# Patient Record
Sex: MALE | Race: WHITE | NOT HISPANIC OR LATINO | Employment: FULL TIME | URBAN - METROPOLITAN AREA
[De-identification: names, ages, dates, MRNs, and addresses within clinical notes are randomized per-mention and may not be internally consistent; named-entity substitution may affect disease eponyms.]

---

## 2023-03-11 LAB
FLU A RESULT: NOT DETECTED
FLU B RESULT: NOT DETECTED
SARS-COV-2 RESULT: NOT DETECTED

## 2023-03-30 PROBLEM — R07.89 CHEST HEAVINESS: Status: ACTIVE | Noted: 2023-03-30

## 2023-03-30 PROBLEM — F32.A ANXIETY AND DEPRESSION: Status: ACTIVE | Noted: 2023-03-30

## 2023-03-30 PROBLEM — E80.4 GILBERT'S SYNDROME: Status: ACTIVE | Noted: 2023-03-30

## 2023-03-30 PROBLEM — E87.6 HYPOKALEMIA: Status: ACTIVE | Noted: 2023-03-30

## 2023-03-30 PROBLEM — E66.9 CLASS 1 OBESITY WITH BODY MASS INDEX (BMI) OF 31.0 TO 31.9 IN ADULT: Status: ACTIVE | Noted: 2023-03-30

## 2023-03-30 PROBLEM — E78.5 HLD (HYPERLIPIDEMIA): Status: ACTIVE | Noted: 2023-03-30

## 2023-03-30 PROBLEM — R01.1 HEART MURMUR, SYSTOLIC: Status: ACTIVE | Noted: 2023-03-30

## 2023-03-30 PROBLEM — G47.00 INSOMNIA: Status: ACTIVE | Noted: 2023-03-30

## 2023-03-30 PROBLEM — R94.31 ABNORMAL EKG: Status: ACTIVE | Noted: 2023-03-30

## 2023-03-30 PROBLEM — H91.93 HEARING LOSS OF BOTH EARS: Status: ACTIVE | Noted: 2023-03-30

## 2023-03-30 PROBLEM — K20.0 EOSINOPHILIC ESOPHAGITIS: Status: ACTIVE | Noted: 2023-03-30

## 2023-03-30 PROBLEM — K44.9 HIATAL HERNIA: Status: ACTIVE | Noted: 2023-03-30

## 2023-03-30 PROBLEM — F41.9 ANXIETY AND DEPRESSION: Status: ACTIVE | Noted: 2023-03-30

## 2023-03-30 PROBLEM — E66.811 CLASS 1 OBESITY WITH BODY MASS INDEX (BMI) OF 31.0 TO 31.9 IN ADULT: Status: ACTIVE | Noted: 2023-03-30

## 2023-03-30 PROBLEM — T18.128A FOOD IMPACTION OF ESOPHAGUS: Status: ACTIVE | Noted: 2023-03-30

## 2023-03-30 PROBLEM — R13.14 DYSPHAGIA, PHARYNGOESOPHAGEAL PHASE: Status: ACTIVE | Noted: 2023-03-30
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
## 2023-03-30 PROBLEM — I10 HYPERTENSION: Status: ACTIVE | Noted: 2023-03-30

## 2023-03-30 PROBLEM — R01.1 HEART MURMUR ON PHYSICAL EXAMINATION: Status: ACTIVE | Noted: 2023-03-30

## 2023-03-30 RX ORDER — SERTRALINE HYDROCHLORIDE 100 MG/1
100 TABLET, FILM COATED ORAL DAILY
COMMUNITY
End: 2023-07-25

## 2023-03-30 RX ORDER — TADALAFIL 10 MG/1
TABLET ORAL
COMMUNITY

## 2023-03-30 RX ORDER — ASCORBIC ACID 500 MG
TABLET,CHEWABLE ORAL
COMMUNITY
Start: 2020-11-02

## 2023-03-30 RX ORDER — CANDESARTAN 16 MG/1
1 TABLET ORAL DAILY
COMMUNITY
Start: 2021-11-02 | End: 2023-09-05

## 2023-03-30 RX ORDER — ACETAMINOPHEN 500 MG
TABLET ORAL
COMMUNITY
Start: 2020-11-02 | End: 2024-03-22 | Stop reason: ALTCHOICE

## 2023-03-30 RX ORDER — AMLODIPINE BESYLATE 10 MG/1
1 TABLET ORAL DAILY
COMMUNITY
Start: 2016-02-10 | End: 2023-05-22

## 2023-03-30 RX ORDER — CHLORTHALIDONE 25 MG/1
1 TABLET ORAL DAILY
COMMUNITY
Start: 2022-02-14 | End: 2023-09-05

## 2023-03-30 RX ORDER — EMTRICITABINE AND TENOFOVIR DISOPROXIL FUMARATE 200; 300 MG/1; MG/1
1 TABLET, FILM COATED ORAL DAILY
COMMUNITY
Start: 2021-11-01 | End: 2023-10-26 | Stop reason: SDUPTHER

## 2023-03-30 RX ORDER — ATORVASTATIN CALCIUM 20 MG/1
1 TABLET, FILM COATED ORAL DAILY
COMMUNITY
Start: 2020-08-05 | End: 2023-10-23

## 2023-03-30 RX ORDER — OMEPRAZOLE 40 MG/1
1 CAPSULE, DELAYED RELEASE ORAL
COMMUNITY
Start: 2021-02-12 | End: 2024-04-30 | Stop reason: ALTCHOICE

## 2023-03-31 ENCOUNTER — OFFICE VISIT (OUTPATIENT)
Dept: PRIMARY CARE | Facility: CLINIC | Age: 53
End: 2023-03-31
Payer: COMMERCIAL

## 2023-03-31 VITALS
OXYGEN SATURATION: 98 % | TEMPERATURE: 98.3 F | BODY MASS INDEX: 30.09 KG/M2 | RESPIRATION RATE: 18 BRPM | HEART RATE: 87 BPM | HEIGHT: 75 IN | WEIGHT: 242 LBS | SYSTOLIC BLOOD PRESSURE: 122 MMHG | DIASTOLIC BLOOD PRESSURE: 86 MMHG

## 2023-03-31 DIAGNOSIS — J01.90 ACUTE SINUSITIS, RECURRENCE NOT SPECIFIED, UNSPECIFIED LOCATION: Primary | ICD-10-CM

## 2023-03-31 PROCEDURE — 3074F SYST BP LT 130 MM HG: CPT | Performed by: FAMILY MEDICINE

## 2023-03-31 PROCEDURE — 3079F DIAST BP 80-89 MM HG: CPT | Performed by: FAMILY MEDICINE

## 2023-03-31 PROCEDURE — 99213 OFFICE O/P EST LOW 20 MIN: CPT | Performed by: FAMILY MEDICINE

## 2023-03-31 PROCEDURE — 3008F BODY MASS INDEX DOCD: CPT | Performed by: FAMILY MEDICINE

## 2023-03-31 PROCEDURE — 1036F TOBACCO NON-USER: CPT | Performed by: FAMILY MEDICINE

## 2023-03-31 RX ORDER — AMOXICILLIN AND CLAVULANATE POTASSIUM 875; 125 MG/1; MG/1
1 TABLET, FILM COATED ORAL 2 TIMES DAILY
Qty: 20 TABLET | Refills: 0 | Status: SHIPPED | OUTPATIENT
Start: 2023-03-31 | End: 2023-04-10

## 2023-03-31 ASSESSMENT — ENCOUNTER SYMPTOMS
SHORTNESS OF BREATH: 0
SINUS COMPLAINT: 1
HEADACHES: 0

## 2023-03-31 NOTE — PROGRESS NOTES
"Subjective     Blake Dominique is a 53 y.o. male who presents for Sinus Problem (Pt. In for sinus problem.).    Sinus Problem  Pertinent negatives include no headaches or shortness of breath.        Review of Systems   Respiratory:  Negative for shortness of breath.    Cardiovascular:  Negative for chest pain.   Neurological:  Negative for headaches.     Pt reports right sided sinus pressure which three weeks ago. He also has nasal drainage, nasal congestion.  Intially had a fever about three weeks ago. He had negative covid and flu swabs at that time. No cough.  He is using nasal steroid as needed and taking sudafed.       Objective     Vitals:    03/31/23 1450   BP: 122/86   BP Location: Right arm   Patient Position: Sitting   Pulse: 87   Resp: 18   Temp: 36.8 °C (98.3 °F)   TempSrc: Temporal   SpO2: 98%   Weight: 110 kg (242 lb)   Height: 1.905 m (6' 3\")        Current Outpatient Medications   Medication Instructions    amLODIPine (Norvasc) 10 mg tablet 1 tablet, oral, Daily    amoxicillin-pot clavulanate (Augmentin) 875-125 mg tablet 875 mg, oral, 2 times daily    ascorbic acid (Vitamin C) 500 mg chewable tablet GNP Vitamin C 500 MG Oral Tablet   Refills: 0        Start : 2-Nov-2020   Active    atorvastatin (Lipitor) 20 mg tablet 1 tablet, oral, Daily    candesartan (Atacand) 16 mg tablet 1 tablet, oral, Daily    chlorthalidone (Hygroton) 25 mg tablet 1 tablet, oral, Daily    cholecalciferol (Vitamin D-3) 50 mcg (2,000 unit) capsule Vitamin D3 50 MCG (2000 UT) Oral Capsule   Refills: 0        Start : 2-Nov-2020   Active    emtricitabine-tenofovir, TDF, (Truvada) 200-300 mg tablet 1 tablet, oral, Daily    fish oil concentrate (Omega-3) 120-180 mg capsule 1 capsule, oral, Daily    omeprazole (PriLOSEC) 40 mg DR capsule 1 capsule, oral, Daily before breakfast    sertraline (ZOLOFT) 100 mg, oral, Daily, As directed    tadalafil (Cialis) 10 mg tablet Tadalafil 10 MG Oral Tablet   Refills: 0       Active        Past " Surgical History:   Procedure Laterality Date    OTHER SURGICAL HISTORY  08/27/2020    Hernia repair    OTHER SURGICAL HISTORY  08/27/2020    Tonsillectomy        Social History     Tobacco Use    Smoking status: Never    Smokeless tobacco: Never        Family History   Problem Relation Name Age of Onset    Dementia Mother      Other (Cardiac disorder) Father      Stroke Father      Colon cancer Other cousin         Immunization History   Administered Date(s) Administered    Hep A, Adult 10/01/2019    Influenza, Unspecified 09/11/2020    Moderna SARS-CoV-2 Vaccination 01/04/2021, 02/01/2021, 11/03/2021    Moderna Sars-cov-2 Bivalent Booster 01/27/2023    Smallpox Monkeypox, Live Attenuated, Preservative Free 08/21/2022, 10/04/2022    Tdap 01/15/2015    Zoster, Recombinant 03/22/2022, 08/30/2022        Physical Exam  Vitals reviewed.   Constitutional:       General: He is not in acute distress.     Appearance: Normal appearance. He is well-developed.   HENT:      Head: Normocephalic and atraumatic.      Comments: Right maxillary  sinus ttp.     Nasal turbinate swelling bilaterally     Right Ear: Tympanic membrane, ear canal and external ear normal.      Left Ear: Tympanic membrane, ear canal and external ear normal.      Nose: Nose normal.      Mouth/Throat:      Mouth: Mucous membranes are moist.      Pharynx: Oropharynx is clear. No oropharyngeal exudate or posterior oropharyngeal erythema.   Eyes:      General: Lids are normal.      Extraocular Movements: Extraocular movements intact.      Conjunctiva/sclera: Conjunctivae normal.      Right eye: Right conjunctiva is not injected.      Left eye: Left conjunctiva is not injected.   Cardiovascular:      Rate and Rhythm: Normal rate and regular rhythm.      Heart sounds: No murmur heard.  Pulmonary:      Effort: Pulmonary effort is normal. No respiratory distress.      Breath sounds: Normal breath sounds. No wheezing, rhonchi or rales.   Lymphadenopathy:       Cervical: No cervical adenopathy.   Skin:     General: Skin is warm and dry.      Findings: No rash.   Neurological:      Mental Status: He is alert and oriented to person, place, and time. Mental status is at baseline.      Gait: Gait normal.   Psychiatric:         Mood and Affect: Mood normal.         Behavior: Behavior normal.         Problem List Items Addressed This Visit    None  Visit Diagnoses       Acute sinusitis, recurrence not specified, unspecified location    -  Primary    Relevant Medications    amoxicillin-pot clavulanate (Augmentin) 875-125 mg tablet            Assessment/Plan      Acute sinusitis - augmentin prescribed, continue flonase, Follow up if no improvement or if symptoms worsen, go the the nearest Emergency Department if your condition worsens significantly or becomes life-threatening.

## 2023-04-28 ENCOUNTER — APPOINTMENT (OUTPATIENT)
Dept: LAB | Facility: LAB | Age: 53
End: 2023-04-28
Payer: COMMERCIAL

## 2023-04-28 LAB — SYPHILIS TOTAL AB: NONREACTIVE

## 2023-05-02 LAB
CHLAMYDIA TRACH., AMPLIFIED: NEGATIVE
N. GONORRHEA, AMPLIFIED: NEGATIVE

## 2023-05-21 DIAGNOSIS — I10 ESSENTIAL (PRIMARY) HYPERTENSION: ICD-10-CM

## 2023-05-22 RX ORDER — AMLODIPINE BESYLATE 10 MG/1
TABLET ORAL
Qty: 90 TABLET | Refills: 1 | Status: SHIPPED | OUTPATIENT
Start: 2023-05-22 | End: 2023-12-01

## 2023-07-22 DIAGNOSIS — F32.A DEPRESSION, UNSPECIFIED: ICD-10-CM

## 2023-07-22 DIAGNOSIS — F41.9 ANXIETY DISORDER, UNSPECIFIED: ICD-10-CM

## 2023-07-25 RX ORDER — SERTRALINE HYDROCHLORIDE 100 MG/1
100 TABLET, FILM COATED ORAL DAILY
Qty: 90 TABLET | Refills: 3 | Status: SHIPPED | OUTPATIENT
Start: 2023-07-25 | End: 2023-10-30 | Stop reason: ALTCHOICE

## 2023-07-29 ENCOUNTER — APPOINTMENT (OUTPATIENT)
Dept: LAB | Facility: LAB | Age: 53
End: 2023-07-29
Payer: COMMERCIAL

## 2023-07-29 LAB
ALBUMIN (G/DL) IN SER/PLAS: 4.7 G/DL (ref 3.4–5)
ANION GAP IN SER/PLAS: 11 MMOL/L (ref 10–20)
CALCIUM (MG/DL) IN SER/PLAS: 10 MG/DL (ref 8.6–10.6)
CARBON DIOXIDE, TOTAL (MMOL/L) IN SER/PLAS: 30 MMOL/L (ref 21–32)
CHLORIDE (MMOL/L) IN SER/PLAS: 100 MMOL/L (ref 98–107)
CREATININE (MG/DL) IN SER/PLAS: 0.95 MG/DL (ref 0.5–1.3)
GFR MALE: >90 ML/MIN/1.73M2
GLUCOSE (MG/DL) IN SER/PLAS: 97 MG/DL (ref 74–99)
HIV 1/ 2 AG/AB SCREEN: NONREACTIVE
PHOSPHATE (MG/DL) IN SER/PLAS: 3.4 MG/DL (ref 2.5–4.9)
POTASSIUM (MMOL/L) IN SER/PLAS: 3.6 MMOL/L (ref 3.5–5.3)
SODIUM (MMOL/L) IN SER/PLAS: 137 MMOL/L (ref 136–145)
SYPHILIS TOTAL AB: NONREACTIVE
UREA NITROGEN (MG/DL) IN SER/PLAS: 18 MG/DL (ref 6–23)

## 2023-07-30 LAB
CHLAMYDIA TRACH., AMPLIFIED: NEGATIVE
N. GONORRHEA, AMPLIFIED: NEGATIVE

## 2023-09-04 DIAGNOSIS — I10 ESSENTIAL (PRIMARY) HYPERTENSION: ICD-10-CM

## 2023-09-05 RX ORDER — CHLORTHALIDONE 25 MG/1
25 TABLET ORAL DAILY
Qty: 90 TABLET | Refills: 3 | Status: SHIPPED | OUTPATIENT
Start: 2023-09-05 | End: 2024-05-03 | Stop reason: ALTCHOICE

## 2023-09-05 RX ORDER — CANDESARTAN 16 MG/1
16 TABLET ORAL DAILY
Qty: 90 TABLET | Refills: 3 | Status: SHIPPED | OUTPATIENT
Start: 2023-09-05

## 2023-10-21 DIAGNOSIS — E78.5 HYPERLIPIDEMIA, UNSPECIFIED: ICD-10-CM

## 2023-10-23 RX ORDER — ATORVASTATIN CALCIUM 20 MG/1
20 TABLET, FILM COATED ORAL DAILY
Qty: 90 TABLET | Refills: 0 | Status: SHIPPED | OUTPATIENT
Start: 2023-10-23 | End: 2024-01-25

## 2023-10-30 ENCOUNTER — OFFICE VISIT (OUTPATIENT)
Dept: IMMUNOLOGY | Facility: CLINIC | Age: 53
End: 2023-10-30
Payer: COMMERCIAL

## 2023-10-30 VITALS
DIASTOLIC BLOOD PRESSURE: 87 MMHG | SYSTOLIC BLOOD PRESSURE: 122 MMHG | OXYGEN SATURATION: 97 % | TEMPERATURE: 97.6 F | RESPIRATION RATE: 18 BRPM | HEIGHT: 75 IN | BODY MASS INDEX: 31.76 KG/M2 | HEART RATE: 87 BPM | WEIGHT: 255.4 LBS

## 2023-10-30 DIAGNOSIS — Z77.21 PERSONAL HISTORY OF EXPOSURE TO POTENTIALLY HAZARDOUS BODY FLUIDS: ICD-10-CM

## 2023-10-30 DIAGNOSIS — Z77.21 PERSONAL HISTORY OF EXPOSURE TO POTENTIALLY HAZARDOUS BODY FLUIDS: Primary | ICD-10-CM

## 2023-10-30 LAB
ALBUMIN SERPL BCP-MCNC: 4.7 G/DL (ref 3.4–5)
ANION GAP SERPL CALC-SCNC: 17 MMOL/L (ref 10–20)
BUN SERPL-MCNC: 19 MG/DL (ref 6–23)
CALCIUM SERPL-MCNC: 10.3 MG/DL (ref 8.6–10.6)
CHLORIDE SERPL-SCNC: 98 MMOL/L (ref 98–107)
CO2 SERPL-SCNC: 28 MMOL/L (ref 21–32)
CREAT SERPL-MCNC: 0.82 MG/DL (ref 0.5–1.3)
GFR SERPL CREATININE-BSD FRML MDRD: >90 ML/MIN/1.73M*2
GLUCOSE SERPL-MCNC: 86 MG/DL (ref 74–99)
HIV 1+2 AB+HIV1 P24 AG SERPL QL IA: NONREACTIVE
PHOSPHATE SERPL-MCNC: 3.2 MG/DL (ref 2.5–4.9)
POTASSIUM SERPL-SCNC: 3.5 MMOL/L (ref 3.5–5.3)
SODIUM SERPL-SCNC: 139 MMOL/L (ref 136–145)
T PALLIDUM AB SER QL: NONREACTIVE

## 2023-10-30 PROCEDURE — 1036F TOBACCO NON-USER: CPT | Performed by: NURSE PRACTITIONER

## 2023-10-30 PROCEDURE — 3074F SYST BP LT 130 MM HG: CPT | Performed by: NURSE PRACTITIONER

## 2023-10-30 PROCEDURE — 36415 COLL VENOUS BLD VENIPUNCTURE: CPT | Performed by: NURSE PRACTITIONER

## 2023-10-30 PROCEDURE — 99213 OFFICE O/P EST LOW 20 MIN: CPT | Performed by: NURSE PRACTITIONER

## 2023-10-30 PROCEDURE — 87389 HIV-1 AG W/HIV-1&-2 AB AG IA: CPT | Performed by: NURSE PRACTITIONER

## 2023-10-30 PROCEDURE — 87800 DETECT AGNT MULT DNA DIREC: CPT | Performed by: NURSE PRACTITIONER

## 2023-10-30 PROCEDURE — 86780 TREPONEMA PALLIDUM: CPT | Performed by: NURSE PRACTITIONER

## 2023-10-30 PROCEDURE — 80069 RENAL FUNCTION PANEL: CPT | Performed by: NURSE PRACTITIONER

## 2023-10-30 PROCEDURE — 3079F DIAST BP 80-89 MM HG: CPT | Performed by: NURSE PRACTITIONER

## 2023-10-30 ASSESSMENT — ENCOUNTER SYMPTOMS
CONSTITUTIONAL NEGATIVE: 1
RESPIRATORY NEGATIVE: 1
ALLERGIC/IMMUNOLOGIC NEGATIVE: 1
ENDOCRINE NEGATIVE: 1
HEMATOLOGIC/LYMPHATIC NEGATIVE: 1
EYES NEGATIVE: 1
CARDIOVASCULAR NEGATIVE: 1
GASTROINTESTINAL NEGATIVE: 1
NEUROLOGICAL NEGATIVE: 1

## 2023-10-30 ASSESSMENT — PAIN SCALES - GENERAL: PAINLEVEL: 0-NO PAIN

## 2023-10-30 NOTE — LETTER
10/30/23    Antonio Perera DO  [address]  [address]  [facility] [address]      Dear Dr. Antonio Perera DO,    I am writing to confirm that your patient, SAIMA Dominique, received care in my office on 10/30/23. I have enclosed a summary of the care provided to CARISSAJulita for your reference.    Please contact me with any questions you may have regarding the visit.    Sincerely,         Renate Hammer, APRN-CNP  [address]  [address]  [facility] [id number]    CC: No Recipients

## 2023-10-30 NOTE — PROGRESS NOTES
HIV PrEP Clinic Follow-up Visit:    Blake Dominique is a 53 y.o. male being seen for PrEP for prevention of HIV infection.  Current PrEP therapy:  Truvada    Risk factors for HIV infection include: (select all that apply):  MSM    Regular condom use? Always  Received treatment for STD since last seen? No    Past Medical History  Past Medical History:   Diagnosis Date    Contact with and (suspected) exposure to potentially hazardous body fluids 09/17/2020    Personal history of exposure to potentially hazardous body fluids    Decreased white blood cell count, unspecified 09/17/2020    Leukopenia    Impacted cerumen, bilateral 09/17/2020    Impacted cerumen of both ears       Allergies  Allergies   Allergen Reactions    Lisinopril Cough       Current Medications:    Current Outpatient Medications:     amLODIPine (Norvasc) 10 mg tablet, TAKE 1 TABLET BY MOUTH EVERY DAY, Disp: 90 tablet, Rfl: 1    ascorbic acid (Vitamin C) 500 mg chewable tablet, GNP Vitamin C 500 MG Oral Tablet  Refills: 0      Start : 2-Nov-2020  Active, Disp: , Rfl:     atorvastatin (Lipitor) 20 mg tablet, TAKE 1 TABLET BY MOUTH EVERY DAY, Disp: 90 tablet, Rfl: 0    candesartan (Atacand) 16 mg tablet, TAKE 1 TABLET BY MOUTH EVERY DAY, Disp: 90 tablet, Rfl: 3    chlorthalidone (Hygroton) 25 mg tablet, TAKE 1 TABLET BY MOUTH EVERY DAY, Disp: 90 tablet, Rfl: 3    cholecalciferol (Vitamin D-3) 50 mcg (2,000 unit) capsule, Vitamin D3 50 MCG (2000 UT) Oral Capsule  Refills: 0      Start : 2-Nov-2020  Active, Disp: , Rfl:     fish oil concentrate (Omega-3) 120-180 mg capsule, Take 1 capsule (1 g) by mouth once daily., Disp: , Rfl:     omeprazole (PriLOSEC) 40 mg DR capsule, Take 1 capsule (40 mg) by mouth once daily in the morning. Take before meals., Disp: , Rfl:     tadalafil (Cialis) 10 mg tablet, Tadalafil 10 MG Oral Tablet  Refills: 0     Active, Disp: , Rfl:     Truvada 200-300 mg tablet, TAKE ONE (1) TABLET BY MOUTH ONCE DAILY., Disp: 30 tablet,  "Rfl: 2    Immunizations:  Immunization History   Administered Date(s) Administered    Hepatitis A vaccine, age 19 years and greater (HAVRIX) 10/01/2019    Influenza, Unspecified 09/11/2020    Moderna COVID-19 vaccine, bivalent, blue cap/gray label *Check age/dose* 01/27/2023    Moderna SARS-CoV-2 Vaccination 01/04/2021, 02/01/2021, 11/03/2021    Smallpox Monkeypox, Live Attenuated, Preservative Free 08/21/2022, 10/04/2022    Tdap vaccine, age 7 year and older (BOOSTRIX) 01/15/2015    Zoster vaccine, recombinant, adult (SHINGRIX) 03/22/2022, 08/30/2022       Review of systems  Review of Systems   Constitutional: Negative.    HENT: Negative.     Eyes: Negative.    Respiratory: Negative.     Cardiovascular: Negative.    Gastrointestinal: Negative.    Endocrine: Negative.    Genitourinary: Negative.    Musculoskeletal:         Spent a lot of time walking on vacation in [place].   Came back with plantar faciitis - got a steroid injection and is doing some exercises.    Skin: Negative.    Allergic/Immunologic: Negative.    Neurological: Negative.    Hematological: Negative.    Psychiatric/Behavioral:          Has tapered off zoloft - no longer taking.     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      PHYSICAL EXAMINATION:  Visit Vitals  /87 (BP Location: Right arm, Patient Position: Sitting, BP Cuff Size: Large adult)   Pulse 87   Temp 36.4 °C (97.6 °F) (Temporal)   Resp 18   Ht 1.905 m (6' 3\")   Wt 116 kg (255 lb 6.4 oz)   SpO2 97%   BMI 31.92 kg/m²   Smoking Status Never   BSA 2.48 m²       Physical Exam   Physical Exam  Vitals reviewed.   Constitutional:       Appearance: Normal appearance.   HENT:      Head: Normocephalic.   Cardiovascular:      Rate and Rhythm: Normal rate and regular rhythm.      Heart sounds: Normal heart sounds.   Pulmonary:      Effort: Pulmonary effort is normal.      Breath sounds: Normal breath sounds.   Abdominal:      General: Bowel " "sounds are normal.      Palpations: Abdomen is soft.   Musculoskeletal:         General: Normal range of motion.      Cervical back: Neck supple.   Skin:     General: Skin is warm and dry.   Neurological:      Mental Status: He is alert and oriented to person, place, and time.   Psychiatric:         Mood and Affect: Mood normal.     He has been tapered off zoloft.    Pertinent data review:  HIV 1 and 2 Screen   Date Value Ref Range Status   07/29/2023 NONREACTIVE NONREACTIVE Final     Comment:      HIV Ag/Ab screen is performed using the Siemens SearchMe   HIV Ag/Ab Combo assay which detects the presence of HIV    p24 antigen as well as antibodies to HIV-1   (Group M and O) and HIV-2.  .  No laboratory evidence of HIV infection. If acute HIV infection is   suspected, consider testing for HIV RNA by PCR (viral load).       Syphilis Total Ab   Date Value Ref Range Status   07/29/2023 NONREACTIVE NONREACTIVE Final     Comment:     No serologic evidence of syphilis infection.  If recent exposure is suspected, repeat syphilis testing  is recommended in 2 to 4 weeks.       No results found for: \"VDRLCSF\"  No results found for: \"RPR\"  Hepatitis C Ab   Date Value Ref Range Status   11/01/2019 NON-REACTIVE NONREACTIVE Final     Comment:      Patients receiving more than 5 mg/day of biotin may have interference   in test results.  A sample should be taken no sooner than eight hours   after  previous dose. Contact the testing laboratory for additional    information.        Hepatitis B Surface Ag   Date Value Ref Range Status   11/01/2019 NONREACTIVE NONREACTIVE Final     Comment:      Patients receiving more than 5 mg/day of biotin may have interference   in test results.  A sample should be taken no sooner than eight hours   after  previous dose. Contact the testing laboratory for additional   information.        Hepatitis B Surface Ab   Date Value Ref Range Status   08/30/2021 <3.1 <10 mIU/mL Final     Comment:     " INTERPRETIVE CRITERIA:  <10 mIU/mL....NONREACTIVE    >=10 mIU/mL...REACTIVE   .   Biotin interference may cause falsely decreased results.   Patients taking a Biotin dose of up to 5 mg/day should   refrain from taking Biotin for 24 hours before sample   collection. Providers may contact their local laboratory   for further information.       Hep A Total Ab Interp   Date Value Ref Range Status   11/01/2019 REACTIVE (A) NONREACTIVE Final     Comment:      Patients receiving more than 5 mg/day of biotin may have interference   in test results.  A sample should be taken no sooner than eight hours   after  previous dose. Contact [phone] for additional information.        Glucose   Date Value Ref Range Status   07/29/2023 97 74 - 99 mg/dL Final     Sodium   Date Value Ref Range Status   07/29/2023 137 136 - 145 mmol/L Final     Potassium   Date Value Ref Range Status   07/29/2023 3.6 3.5 - 5.3 mmol/L Final     Chloride   Date Value Ref Range Status   07/29/2023 100 98 - 107 mmol/L Final     Anion Gap   Date Value Ref Range Status   07/29/2023 11 10 - 20 mmol/L Final     Urea Nitrogen   Date Value Ref Range Status   07/29/2023 18 6 - 23 mg/dL Final     Creatinine   Date Value Ref Range Status   07/29/2023 0.95 0.50 - 1.30 mg/dL Final     Calcium   Date Value Ref Range Status   07/29/2023 10.0 8.6 - 10.6 mg/dL Final     Phosphorus   Date Value Ref Range Status   07/29/2023 3.4 2.5 - 4.9 mg/dL Final     Comment:      The performance characteristics of phosphorus testing in   heparinized plasma have been validated by the individual     laboratory site where testing is performed. Testing    on heparinized plasma is not approved by the FDA;    however, such approval is not necessary.       Albumin   Date Value Ref Range Status   07/29/2023 4.7 3.4 - 5.0 g/dL Final       Assessment and Plan:  Blake CAMERON Catina is a 53 y.o.male seen today for evaluation of appropriateness for continuation of  PrEP therapy as they continue  to be at greater risk for acquiring HIV infection.  HIV Ag/Ab, Syphilis testing, and renal function will be collected today.  GC NAAT testing will be obtained from 1 site - throat  If HIV testing negative, prescription for PrEP will be renewed.    Will get routine PrEP labs and then labs are due again in 3 months.  Will see face to face in 6 months.   Problem List Items Addressed This Visit    None  Congratulations on being done with your degree!! Best of luck in finding the job you want!!   Thank you  for coming in to see us today.     Renate Hammer, NICK-CNP

## 2023-10-31 ENCOUNTER — SPECIALTY PHARMACY (OUTPATIENT)
Dept: PHARMACY | Facility: CLINIC | Age: 53
End: 2023-10-31

## 2023-10-31 LAB
C TRACH RRNA SPEC QL NAA+PROBE: NEGATIVE
C TRACH RRNA SPEC QL NAA+PROBE: NEGATIVE
N GONORRHOEA DNA SPEC QL PROBE+SIG AMP: NEGATIVE
N GONORRHOEA DNA SPEC QL PROBE+SIG AMP: NEGATIVE

## 2023-11-01 ENCOUNTER — PHARMACY VISIT (OUTPATIENT)
Dept: PHARMACY | Facility: CLINIC | Age: 53
End: 2023-11-01
Payer: COMMERCIAL

## 2023-11-01 PROCEDURE — RXMED WILLOW AMBULATORY MEDICATION CHARGE

## 2023-11-29 ENCOUNTER — LAB REQUISITION (OUTPATIENT)
Dept: LAB | Facility: HOSPITAL | Age: 53
End: 2023-11-29
Payer: COMMERCIAL

## 2023-11-29 DIAGNOSIS — I10 ESSENTIAL (PRIMARY) HYPERTENSION: ICD-10-CM

## 2023-11-29 LAB — SARS-COV-2 RNA RESP QL NAA+PROBE: NOT DETECTED

## 2023-11-29 PROCEDURE — 87635 SARS-COV-2 COVID-19 AMP PRB: CPT

## 2023-12-01 RX ORDER — AMLODIPINE BESYLATE 10 MG/1
TABLET ORAL
Qty: 90 TABLET | Refills: 3 | Status: SHIPPED | OUTPATIENT
Start: 2023-12-01

## 2023-12-12 RX ORDER — EMTRICITABINE AND TENOFOVIR DISOPROXIL FUMARATE 200; 300 MG/1; MG/1
1 TABLET, FILM COATED ORAL DAILY
Qty: 30 TABLET | Refills: 2 | Status: CANCELLED | OUTPATIENT
Start: 2023-12-12 | End: 2024-12-11

## 2023-12-13 ENCOUNTER — PHARMACY VISIT (OUTPATIENT)
Dept: PHARMACY | Facility: CLINIC | Age: 53
End: 2023-12-13
Payer: COMMERCIAL

## 2023-12-13 DIAGNOSIS — Z77.21 PERSONAL HISTORY OF EXPOSURE TO POTENTIALLY HAZARDOUS BODY FLUIDS: ICD-10-CM

## 2023-12-13 DIAGNOSIS — Z77.21 PERSONAL HISTORY OF EXPOSURE TO POTENTIALLY HAZARDOUS BODY FLUIDS: Primary | ICD-10-CM

## 2023-12-13 PROCEDURE — RXMED WILLOW AMBULATORY MEDICATION CHARGE

## 2023-12-13 RX ORDER — EMTRICITABINE AND TENOFOVIR DISOPROXIL FUMARATE 200; 300 MG/1; MG/1
1 TABLET, FILM COATED ORAL DAILY
Qty: 30 TABLET | Refills: 1 | Status: SHIPPED | OUTPATIENT
Start: 2023-12-13 | End: 2024-01-08 | Stop reason: SDUPTHER

## 2024-01-05 ENCOUNTER — PHARMACY VISIT (OUTPATIENT)
Dept: PHARMACY | Facility: CLINIC | Age: 54
End: 2024-01-05
Payer: COMMERCIAL

## 2024-01-05 PROCEDURE — RXMED WILLOW AMBULATORY MEDICATION CHARGE

## 2024-01-08 DIAGNOSIS — Z77.21 PERSONAL HISTORY OF EXPOSURE TO POTENTIALLY HAZARDOUS BODY FLUIDS: ICD-10-CM

## 2024-01-09 RX ORDER — EMTRICITABINE AND TENOFOVIR DISOPROXIL FUMARATE 200; 300 MG/1; MG/1
1 TABLET, FILM COATED ORAL DAILY
Qty: 30 TABLET | Refills: 1 | Status: SHIPPED | OUTPATIENT
Start: 2024-01-09 | End: 2024-04-09 | Stop reason: SDUPTHER

## 2024-01-18 ENCOUNTER — SPECIALTY PHARMACY (OUTPATIENT)
Dept: PHARMACY | Facility: CLINIC | Age: 54
End: 2024-01-18

## 2024-01-25 DIAGNOSIS — E78.5 HYPERLIPIDEMIA, UNSPECIFIED: ICD-10-CM

## 2024-01-25 RX ORDER — ATORVASTATIN CALCIUM 20 MG/1
20 TABLET, FILM COATED ORAL DAILY
Qty: 90 TABLET | Refills: 0 | Status: SHIPPED | OUTPATIENT
Start: 2024-01-25 | End: 2024-04-29

## 2024-02-01 ENCOUNTER — OFFICE VISIT (OUTPATIENT)
Dept: PRIMARY CARE | Facility: CLINIC | Age: 54
End: 2024-02-01
Payer: COMMERCIAL

## 2024-02-01 VITALS
DIASTOLIC BLOOD PRESSURE: 83 MMHG | TEMPERATURE: 98 F | WEIGHT: 267 LBS | SYSTOLIC BLOOD PRESSURE: 121 MMHG | HEART RATE: 93 BPM | RESPIRATION RATE: 18 BRPM | HEIGHT: 75 IN | OXYGEN SATURATION: 98 % | BODY MASS INDEX: 33.2 KG/M2

## 2024-02-01 DIAGNOSIS — F32.A ANXIETY AND DEPRESSION: ICD-10-CM

## 2024-02-01 DIAGNOSIS — E78.5 HYPERLIPIDEMIA, UNSPECIFIED HYPERLIPIDEMIA TYPE: ICD-10-CM

## 2024-02-01 DIAGNOSIS — R94.31 ABNORMAL EKG: ICD-10-CM

## 2024-02-01 DIAGNOSIS — K20.0 EOSINOPHILIC ESOPHAGITIS: ICD-10-CM

## 2024-02-01 DIAGNOSIS — Z00.00 HEALTH CARE MAINTENANCE: Primary | ICD-10-CM

## 2024-02-01 DIAGNOSIS — F41.9 ANXIETY AND DEPRESSION: ICD-10-CM

## 2024-02-01 DIAGNOSIS — I10 HYPERTENSION, ESSENTIAL, BENIGN: ICD-10-CM

## 2024-02-01 DIAGNOSIS — E55.9 VITAMIN D DEFICIENCY: ICD-10-CM

## 2024-02-01 DIAGNOSIS — R01.1 HEART MURMUR, SYSTOLIC: ICD-10-CM

## 2024-02-01 DIAGNOSIS — I49.3 FREQUENT PVCS: ICD-10-CM

## 2024-02-01 DIAGNOSIS — E80.4 GILBERT'S SYNDROME: ICD-10-CM

## 2024-02-01 DIAGNOSIS — Z12.5 SCREENING PSA (PROSTATE SPECIFIC ANTIGEN): ICD-10-CM

## 2024-02-01 PROCEDURE — 1036F TOBACCO NON-USER: CPT | Performed by: FAMILY MEDICINE

## 2024-02-01 PROCEDURE — 90471 IMMUNIZATION ADMIN: CPT | Performed by: FAMILY MEDICINE

## 2024-02-01 PROCEDURE — 3079F DIAST BP 80-89 MM HG: CPT | Performed by: FAMILY MEDICINE

## 2024-02-01 PROCEDURE — 90715 TDAP VACCINE 7 YRS/> IM: CPT | Performed by: FAMILY MEDICINE

## 2024-02-01 PROCEDURE — 99396 PREV VISIT EST AGE 40-64: CPT | Performed by: FAMILY MEDICINE

## 2024-02-01 PROCEDURE — 3074F SYST BP LT 130 MM HG: CPT | Performed by: FAMILY MEDICINE

## 2024-02-01 PROCEDURE — 93000 ELECTROCARDIOGRAM COMPLETE: CPT | Performed by: FAMILY MEDICINE

## 2024-02-01 RX ORDER — ESCITALOPRAM OXALATE 10 MG/1
10 TABLET ORAL DAILY
Qty: 30 TABLET | Refills: 2 | Status: SHIPPED | OUTPATIENT
Start: 2024-02-01 | End: 2024-05-06 | Stop reason: SDUPTHER

## 2024-02-01 ASSESSMENT — PATIENT HEALTH QUESTIONNAIRE - PHQ9
SUM OF ALL RESPONSES TO PHQ9 QUESTIONS 1 AND 2: 0
2. FEELING DOWN, DEPRESSED OR HOPELESS: NOT AT ALL
1. LITTLE INTEREST OR PLEASURE IN DOING THINGS: NOT AT ALL

## 2024-02-01 ASSESSMENT — PROMIS GLOBAL HEALTH SCALE
CARRYOUT_PHYSICAL_ACTIVITIES: COMPLETELY
RATE_MENTAL_HEALTH: VERY GOOD
RATE_PHYSICAL_HEALTH: VERY GOOD
CARRYOUT_SOCIAL_ACTIVITIES: VERY GOOD
RATE_QUALITY_OF_LIFE: VERY GOOD
RATE_AVERAGE_FATIGUE: MILD
RATE_GENERAL_HEALTH: VERY GOOD
RATE_SOCIAL_SATISFACTION: VERY GOOD
RATE_AVERAGE_PAIN: 3
EMOTIONAL_PROBLEMS: SOMETIMES

## 2024-02-01 ASSESSMENT — ENCOUNTER SYMPTOMS
OCCASIONAL FEELINGS OF UNSTEADINESS: 0
DEPRESSION: 0
HEADACHES: 0
SHORTNESS OF BREATH: 0
LOSS OF SENSATION IN FEET: 0

## 2024-02-01 NOTE — PROGRESS NOTES
"Subjective     Blake CAMERON Masters \"E.J.\" is a 53 y.o. male who presents for Annual Exam.    HPI     Pt is here today for annual well exam.     Former smoker, quit five years ago.     Pt feels well.      Review of Systems   Respiratory:  Negative for shortness of breath.    Cardiovascular:  Negative for chest pain.   Neurological:  Negative for headaches.       Objective     Vitals:    02/01/24 1449   BP: 121/83   BP Location: Left arm   Patient Position: Sitting   Pulse: 93   Resp: 18   Temp: 36.7 °C (98 °F)   TempSrc: Temporal   SpO2: 98%   Weight: 121 kg (267 lb)   Height: 1.905 m (6' 3\")        Current Outpatient Medications   Medication Instructions    amLODIPine (Norvasc) 10 mg tablet TAKE 1 TABLET BY MOUTH EVERY DAY    ascorbic acid (Vitamin C) 500 mg chewable tablet GNP Vitamin C 500 MG Oral Tablet   Refills: 0        Start : 2-Nov-2020   Active    atorvastatin (LIPITOR) 20 mg, oral, Daily    candesartan (ATACAND) 16 mg, oral, Daily    chlorthalidone (HYGROTON) 25 mg, oral, Daily    cholecalciferol (Vitamin D-3) 50 mcg (2,000 unit) capsule Vitamin D3 50 MCG (2000 UT) Oral Capsule   Refills: 0        Start : 2-Nov-2020   Active    emtricitabine-tenofovir, TDF, (Truvada) 200-300 mg tablet 1 tablet, oral, Daily    escitalopram (LEXAPRO) 10 mg, oral, Daily    fish oil concentrate (Omega-3) 120-180 mg capsule 1 capsule, oral, Daily    omeprazole (PriLOSEC) 40 mg DR capsule 1 capsule, oral, Daily before breakfast    tadalafil (Cialis) 10 mg tablet Tadalafil 10 MG Oral Tablet   Refills: 0       Active        Past Surgical History:   Procedure Laterality Date    OTHER SURGICAL HISTORY  08/27/2020    Hernia repair    OTHER SURGICAL HISTORY  08/27/2020    Tonsillectomy        Social History     Tobacco Use    Smoking status: Never    Smokeless tobacco: Never   Vaping Use    Vaping Use: Never used   Substance Use Topics    Alcohol use: Yes     Alcohol/week: 4.0 standard drinks of alcohol     Types: 4 Standard drinks or " equivalent per week    Drug use: Never        Family History   Problem Relation Name Age of Onset    Dementia Mother      Other (Cardiac disorder) Father      Stroke Father      Colon cancer Other cousin         Immunization History   Administered Date(s) Administered    Hepatitis A vaccine, age 19 years and greater (HAVRIX) 10/01/2019    Influenza, Unspecified 09/11/2020    Influenza, seasonal, injectable 09/30/2023    Moderna COVID-19 vaccine, bivalent, blue cap/gray label *Check age/dose* 01/27/2023    Moderna SARS-CoV-2 Vaccination 01/04/2021, 02/01/2021, 11/03/2021    Pneumococcal polysaccharide vaccine, 23-valent, age 2 years and older (PNEUMOVAX 23) 08/31/2011    Small pox and monkeypox vaccine (Jynneos) *check dose/route* 08/21/2022, 10/04/2022    Tdap vaccine, age 7 year and older (BOOSTRIX, ADACEL) 01/15/2015, 02/01/2024    Zoster vaccine, recombinant, adult (SHINGRIX) 03/22/2022, 08/30/2022        Physical Exam  Vitals reviewed.   Constitutional:       General: He is not in acute distress.     Appearance: Normal appearance.   HENT:      Head: Normocephalic and atraumatic.      Right Ear: Tympanic membrane, ear canal and external ear normal.      Left Ear: Tympanic membrane, ear canal and external ear normal.      Nose: Nose normal.      Mouth/Throat:      Mouth: Mucous membranes are moist.      Pharynx: Oropharynx is clear. No oropharyngeal exudate or posterior oropharyngeal erythema.   Eyes:      Extraocular Movements: Extraocular movements intact.      Pupils: Pupils are equal, round, and reactive to light.   Neck:      Thyroid: No thyroid mass or thyromegaly.   Cardiovascular:      Rate and Rhythm: Normal rate and regular rhythm.      Heart sounds: No murmur heard.  Pulmonary:      Effort: Pulmonary effort is normal. No respiratory distress.      Breath sounds: Normal breath sounds. No wheezing, rhonchi or rales.   Abdominal:      General: Abdomen is flat. There is no distension.      Palpations:  Abdomen is soft.      Tenderness: There is no abdominal tenderness.   Genitourinary:     Testes: Normal.   Musculoskeletal:         General: Normal range of motion.   Lymphadenopathy:      Cervical: No cervical adenopathy.   Skin:     General: Skin is warm and dry.      Findings: No rash.   Neurological:      General: No focal deficit present.      Mental Status: He is alert and oriented to person, place, and time. Mental status is at baseline.   Psychiatric:         Mood and Affect: Mood normal.         Behavior: Behavior normal.         Problem List Items Addressed This Visit       Abnormal EKG    Relevant Orders    Referral to Cardiology    Anxiety and depression    Relevant Medications    escitalopram (Lexapro) 10 mg tablet    Eosinophilic esophagitis    Gilbert's syndrome    Heart murmur, systolic    HLD (hyperlipidemia)    Relevant Orders    Comprehensive Metabolic Panel    Lipid Panel    Hypertension, essential, benign    Relevant Orders    CBC and Auto Differential    Vitamin D deficiency    Relevant Orders    Vitamin D 25-Hydroxy,Total (for eval of Vitamin D levels)     Other Visit Diagnoses       Health care maintenance    -  Primary    Relevant Orders    POCT UA (nonautomated) manually resulted    ECG 12 lead (Clinic Performed) (Completed)    Tdap vaccine, age 7 years and older (Completed)    Comprehensive Metabolic Panel    Lipid Panel    CBC and Auto Differential    Hemoglobin A1C    Screening PSA (prostate specific antigen)        Relevant Orders    Prostate Specific Antigen    Frequent PVCs        Relevant Orders    Referral to Cardiology            Assessment/Plan     Well Exam     Colon screening - Colonoscopy is up to date     Vaccines - tdap given today  Utd with shingrix, flu vaccine     I recommend yearly flu vaccine and routine COVID vaccinations as indicated     Complete labs    Abnormal EKG - PVCs, referred to cardio for evaluation    Former smoker    Calcium score of 0 in 2018.      Obesity  - BMI 33- Healthy diet, routine exercise was discussed with patient      Anxiety/depression - formerly on zoloft, stopped by patient, requesting to go on lexapro.  I will start lexapro 10 mg daily.     Hypertension - controlled    HLD - on statin     Follow up in 3 months or sooner if needed

## 2024-02-03 ENCOUNTER — LAB (OUTPATIENT)
Dept: LAB | Facility: LAB | Age: 54
End: 2024-02-03
Payer: COMMERCIAL

## 2024-02-03 DIAGNOSIS — Z77.21 PERSONAL HISTORY OF EXPOSURE TO POTENTIALLY HAZARDOUS BODY FLUIDS: ICD-10-CM

## 2024-02-03 DIAGNOSIS — E55.9 VITAMIN D DEFICIENCY: ICD-10-CM

## 2024-02-03 DIAGNOSIS — I10 HYPERTENSION, ESSENTIAL, BENIGN: ICD-10-CM

## 2024-02-03 DIAGNOSIS — Z12.5 SCREENING PSA (PROSTATE SPECIFIC ANTIGEN): ICD-10-CM

## 2024-02-03 DIAGNOSIS — Z00.00 HEALTH CARE MAINTENANCE: ICD-10-CM

## 2024-02-03 DIAGNOSIS — E78.5 HYPERLIPIDEMIA, UNSPECIFIED HYPERLIPIDEMIA TYPE: ICD-10-CM

## 2024-02-03 LAB
25(OH)D3 SERPL-MCNC: 72 NG/ML (ref 30–100)
ALBUMIN SERPL BCP-MCNC: 4.4 G/DL (ref 3.4–5)
ALP SERPL-CCNC: 55 U/L (ref 33–120)
ALT SERPL W P-5'-P-CCNC: 34 U/L (ref 10–52)
ANION GAP SERPL CALC-SCNC: 13 MMOL/L (ref 10–20)
AST SERPL W P-5'-P-CCNC: 26 U/L (ref 9–39)
BASOPHILS # BLD AUTO: 0.05 X10*3/UL (ref 0–0.1)
BASOPHILS NFR BLD AUTO: 0.8 %
BILIRUB SERPL-MCNC: 1.7 MG/DL (ref 0–1.2)
BUN SERPL-MCNC: 21 MG/DL (ref 6–23)
C TRACH RRNA SPEC QL NAA+PROBE: NEGATIVE
CALCIUM SERPL-MCNC: 9.6 MG/DL (ref 8.6–10.6)
CHLORIDE SERPL-SCNC: 102 MMOL/L (ref 98–107)
CHOLEST SERPL-MCNC: 132 MG/DL (ref 0–199)
CHOLESTEROL/HDL RATIO: 4.3
CO2 SERPL-SCNC: 27 MMOL/L (ref 21–32)
CREAT SERPL-MCNC: 0.89 MG/DL (ref 0.5–1.3)
EGFRCR SERPLBLD CKD-EPI 2021: >90 ML/MIN/1.73M*2
EOSINOPHIL # BLD AUTO: 0.18 X10*3/UL (ref 0–0.7)
EOSINOPHIL NFR BLD AUTO: 2.9 %
ERYTHROCYTE [DISTWIDTH] IN BLOOD BY AUTOMATED COUNT: 11.9 % (ref 11.5–14.5)
EST. AVERAGE GLUCOSE BLD GHB EST-MCNC: 94 MG/DL
GLUCOSE SERPL-MCNC: 104 MG/DL (ref 74–99)
HBA1C MFR BLD: 4.9 %
HCT VFR BLD AUTO: 43 % (ref 41–52)
HDLC SERPL-MCNC: 31 MG/DL
HGB BLD-MCNC: 15.7 G/DL (ref 13.5–17.5)
HIV 1+2 AB+HIV1 P24 AG SERPL QL IA: NONREACTIVE
IMM GRANULOCYTES # BLD AUTO: 0.03 X10*3/UL (ref 0–0.7)
IMM GRANULOCYTES NFR BLD AUTO: 0.5 % (ref 0–0.9)
LDLC SERPL CALC-MCNC: 70 MG/DL
LYMPHOCYTES # BLD AUTO: 1.71 X10*3/UL (ref 1.2–4.8)
LYMPHOCYTES NFR BLD AUTO: 27.6 %
MCH RBC QN AUTO: 30.8 PG (ref 26–34)
MCHC RBC AUTO-ENTMCNC: 36.5 G/DL (ref 32–36)
MCV RBC AUTO: 85 FL (ref 80–100)
MONOCYTES # BLD AUTO: 0.48 X10*3/UL (ref 0.1–1)
MONOCYTES NFR BLD AUTO: 7.7 %
N GONORRHOEA DNA SPEC QL PROBE+SIG AMP: NEGATIVE
NEUTROPHILS # BLD AUTO: 3.75 X10*3/UL (ref 1.2–7.7)
NEUTROPHILS NFR BLD AUTO: 60.5 %
NON HDL CHOLESTEROL: 101 MG/DL (ref 0–149)
NRBC BLD-RTO: 0 /100 WBCS (ref 0–0)
PLATELET # BLD AUTO: 206 X10*3/UL (ref 150–450)
POTASSIUM SERPL-SCNC: 3.8 MMOL/L (ref 3.5–5.3)
PROT SERPL-MCNC: 7.1 G/DL (ref 6.4–8.2)
PSA SERPL-MCNC: 9.37 NG/ML
RBC # BLD AUTO: 5.09 X10*6/UL (ref 4.5–5.9)
SODIUM SERPL-SCNC: 138 MMOL/L (ref 136–145)
TRIGL SERPL-MCNC: 157 MG/DL (ref 0–149)
VLDL: 31 MG/DL (ref 0–40)
WBC # BLD AUTO: 6.2 X10*3/UL (ref 4.4–11.3)

## 2024-02-03 PROCEDURE — 85025 COMPLETE CBC W/AUTO DIFF WBC: CPT

## 2024-02-03 PROCEDURE — 36415 COLL VENOUS BLD VENIPUNCTURE: CPT

## 2024-02-03 PROCEDURE — 82306 VITAMIN D 25 HYDROXY: CPT

## 2024-02-03 PROCEDURE — 80053 COMPREHEN METABOLIC PANEL: CPT

## 2024-02-03 PROCEDURE — 87389 HIV-1 AG W/HIV-1&-2 AB AG IA: CPT

## 2024-02-03 PROCEDURE — 84153 ASSAY OF PSA TOTAL: CPT

## 2024-02-03 PROCEDURE — 87800 DETECT AGNT MULT DNA DIREC: CPT

## 2024-02-03 PROCEDURE — 83036 HEMOGLOBIN GLYCOSYLATED A1C: CPT

## 2024-02-03 PROCEDURE — 80061 LIPID PANEL: CPT

## 2024-02-05 ENCOUNTER — TELEPHONE (OUTPATIENT)
Dept: PRIMARY CARE | Facility: CLINIC | Age: 54
End: 2024-02-05
Payer: COMMERCIAL

## 2024-02-05 DIAGNOSIS — R97.20 ELEVATED PSA, LESS THAN 10 NG/ML: Primary | ICD-10-CM

## 2024-02-14 ENCOUNTER — PHARMACY VISIT (OUTPATIENT)
Dept: PHARMACY | Facility: CLINIC | Age: 54
End: 2024-02-14
Payer: COMMERCIAL

## 2024-02-14 PROCEDURE — RXMED WILLOW AMBULATORY MEDICATION CHARGE

## 2024-03-11 ENCOUNTER — SPECIALTY PHARMACY (OUTPATIENT)
Dept: PHARMACY | Facility: CLINIC | Age: 54
End: 2024-03-11

## 2024-03-11 PROCEDURE — RXMED WILLOW AMBULATORY MEDICATION CHARGE

## 2024-03-13 ENCOUNTER — PHARMACY VISIT (OUTPATIENT)
Dept: PHARMACY | Facility: CLINIC | Age: 54
End: 2024-03-13
Payer: COMMERCIAL

## 2024-03-19 ENCOUNTER — LAB (OUTPATIENT)
Dept: LAB | Facility: LAB | Age: 54
End: 2024-03-19
Payer: COMMERCIAL

## 2024-03-19 DIAGNOSIS — R97.20 ELEVATED PSA, LESS THAN 10 NG/ML: ICD-10-CM

## 2024-03-19 LAB — PSA SERPL-MCNC: 1.8 NG/ML

## 2024-03-19 PROCEDURE — 84153 ASSAY OF PSA TOTAL: CPT

## 2024-03-19 PROCEDURE — 36415 COLL VENOUS BLD VENIPUNCTURE: CPT

## 2024-03-22 ENCOUNTER — OFFICE VISIT (OUTPATIENT)
Dept: CARDIOLOGY | Facility: CLINIC | Age: 54
End: 2024-03-22
Payer: COMMERCIAL

## 2024-03-22 VITALS
WEIGHT: 248 LBS | SYSTOLIC BLOOD PRESSURE: 143 MMHG | HEART RATE: 80 BPM | DIASTOLIC BLOOD PRESSURE: 82 MMHG | OXYGEN SATURATION: 96 % | HEIGHT: 75 IN | BODY MASS INDEX: 30.84 KG/M2

## 2024-03-22 DIAGNOSIS — R94.31 ABNORMAL EKG: ICD-10-CM

## 2024-03-22 DIAGNOSIS — I49.3 FREQUENT PVCS: ICD-10-CM

## 2024-03-22 DIAGNOSIS — R01.1 HEART MURMUR, SYSTOLIC: Primary | ICD-10-CM

## 2024-03-22 PROCEDURE — 93005 ELECTROCARDIOGRAM TRACING: CPT | Performed by: INTERNAL MEDICINE

## 2024-03-22 PROCEDURE — 3079F DIAST BP 80-89 MM HG: CPT | Performed by: INTERNAL MEDICINE

## 2024-03-22 PROCEDURE — 93010 ELECTROCARDIOGRAM REPORT: CPT | Performed by: INTERNAL MEDICINE

## 2024-03-22 PROCEDURE — 99204 OFFICE O/P NEW MOD 45 MIN: CPT | Performed by: INTERNAL MEDICINE

## 2024-03-22 PROCEDURE — 3075F SYST BP GE 130 - 139MM HG: CPT | Performed by: INTERNAL MEDICINE

## 2024-03-22 PROCEDURE — 99214 OFFICE O/P EST MOD 30 MIN: CPT | Performed by: INTERNAL MEDICINE

## 2024-03-22 PROCEDURE — 1036F TOBACCO NON-USER: CPT | Performed by: INTERNAL MEDICINE

## 2024-03-22 ASSESSMENT — PAIN SCALES - GENERAL: PAINLEVEL: 0-NO PAIN

## 2024-03-22 ASSESSMENT — ENCOUNTER SYMPTOMS
DEPRESSION: 0
OCCASIONAL FEELINGS OF UNSTEADINESS: 0
LOSS OF SENSATION IN FEET: 0

## 2024-03-22 NOTE — PROGRESS NOTES
Name : Blake Doimnique    : 1970   MRN : [id number]   ENC Date : 24     Reason for visit: Abnormal ECG and heart murmur    Assessment and Plan:  Heart murmur: Patient does have a soft systolic heart murmur mostly heard over the upper left sternal border.  I suspect this is an aortic outflow tract/aortic valve sclerosis murmur.  He did not have any valvular heart disease on his stress echocardiogram several years ago.  It is reasonable to repeat an echocardiogram however to make sure were not missing any other valve disease.  Abnormal ECG: Patient's EKG is abnormal and is suggestive of a prior inferior infarct.  However his EKG is superimposable on his electrocardiogram from 2020.  There has been no change in 3-1/2 years.  He is asymptomatic.  His cardiac stress test in 2020 showed no evidence of ischemia.  I do not think a repeat stress test is needed.  Disp: Phone follow-up with echocardiogram results.      HPI:  Patient seen for evaluation of heart murmur heard by primary care physician.  Patient has a history of abnormal EKG evaluated in  with stress test and stress echocardiogram which was unremarkable for ischemia.  Since  he has not had any chest pain.  He is able to do all activities without any difficulty.  No syncopal events.  No TIA or CVA-like symptoms.  No fevers or chills.  No rashes.  Coronary calcium score in 2018 was 0.      Problem List:   Patient Active Problem List   Diagnosis    Abnormal EKG    Anxiety and depression    Chest heaviness    Dysphagia, pharyngoesophageal phase    Eosinophilic esophagitis    Food impaction of esophagus    Gilbert's syndrome    Heart murmur on physical examination    Heart murmur, systolic    Hiatal hernia    Insomnia    HLD (hyperlipidemia)    Hypertension, essential, benign    Hypokalemia    Hearing loss of both ears    Class 1 obesity with body mass index (BMI) of 31.0 to 31.9 in adult    Vitamin D deficiency        Meds:    Current Outpatient Medications on File Prior to Visit   Medication Sig Dispense Refill    amLODIPine (Norvasc) 10 mg tablet TAKE 1 TABLET BY MOUTH EVERY DAY 90 tablet 3    ascorbic acid (Vitamin C) 500 mg chewable tablet GNP Vitamin C 500 MG Oral Tablet   Refills: 0        Start : 2-Nov-2020   Active      atorvastatin (Lipitor) 20 mg tablet TAKE 1 TABLET BY MOUTH EVERY DAY 90 tablet 0    candesartan (Atacand) 16 mg tablet TAKE 1 TABLET BY MOUTH EVERY DAY 90 tablet 3    chlorthalidone (Hygroton) 25 mg tablet TAKE 1 TABLET BY MOUTH EVERY DAY 90 tablet 3    emtricitabine-tenofovir, TDF, (Truvada) 200-300 mg tablet Take 1 tablet by mouth once daily. 30 tablet 1    escitalopram (Lexapro) 10 mg tablet Take 1 tablet (10 mg) by mouth once daily. 30 tablet 2    fish oil concentrate (Omega-3) 120-180 mg capsule Take 1 capsule (1 g) by mouth once daily.      omeprazole (PriLOSEC) 40 mg DR capsule Take 1 capsule (40 mg) by mouth once daily in the morning. Take before meals.      tadalafil (Cialis) 10 mg tablet Tadalafil 10 MG Oral Tablet   Refills: 0       Active      [DISCONTINUED] cholecalciferol (Vitamin D-3) 50 mcg (2,000 unit) capsule Vitamin D3 50 MCG (2000 UT) Oral Capsule   Refills: 0        Start : 2-Nov-2020   Active       No current facility-administered medications on file prior to visit.       All:   Allergies   Allergen Reactions    Lisinopril Cough       Fam Hx:   Family History   Problem Relation Name Age of Onset    Dementia Mother      Other (Cardiac disorder) Father      Stroke Father      Colon cancer Other cousin        Soc Hx:   Social History     Socioeconomic History    Marital status: Single     Spouse name: Not on file    Number of children: Not on file    Years of education: Not on file    Highest education level: Not on file   Occupational History    Not on file   Tobacco Use    Smoking status: Never    Smokeless tobacco: Never   Vaping Use    Vaping Use: Never used   Substance and Sexual Activity  "   Alcohol use: Yes     Alcohol/week: 4.0 standard drinks of alcohol     Types: 4 Standard drinks or equivalent per week    Drug use: Never    Sexual activity: Not on file   Other Topics Concern    Not on file   Social History Narrative    Not on file     Social Determinants of Health     Financial Resource Strain: Not on file   Food Insecurity: Not on file   Transportation Needs: Not on file   Physical Activity: Not on file   Stress: Not on file   Social Connections: Not on file   Intimate Partner Violence: Not on file   Housing Stability: Not on file       ROS    VS: /82 (BP Location: Left arm, Patient Position: Sitting)   Pulse 80   Ht 1.905 m (6' 3\")   Wt 112 kg (248 lb)   SpO2 96%   BMI 31.00 kg/m²      Physical Exam  Vitals reviewed.   Constitutional:       Appearance: Normal appearance.   Eyes:      Pupils: Pupils are equal, round, and reactive to light.   Neck:      Vascular: No JVD.   Cardiovascular:      Rate and Rhythm: Normal rate and regular rhythm.      Pulses: Normal pulses.      Heart sounds: Murmur heard.      Systolic murmur is present with a grade of 1/6.      No gallop.   Pulmonary:      Effort: No respiratory distress.      Breath sounds: No wheezing or rales.   Abdominal:      General: Abdomen is flat. There is no distension.      Palpations: Abdomen is soft.   Musculoskeletal:         General: No swelling.      Right lower leg: No edema.      Left lower leg: No edema.   Neurological:      General: No focal deficit present.      Mental Status: He is alert.   Psychiatric:         Mood and Affect: Mood normal.             No echocardiogram results found for the past 12 months  Encounter Date: 02/01/24   ECG 12 lead (Clinic Performed)    Narrative    Sinus rhythm with frequent PVCs        ECG: Normal sinus rhythm.  Inferior Q waves that could be suggestive of prior inferior infarct however T waves are upright.  Possible left ventricular hypertrophy with repull variant.  EKG unchanged " from 2020.      Kaiden Miles MD

## 2024-03-29 LAB
ATRIAL RATE: 70 BPM
P AXIS: -8 DEGREES
P OFFSET: 179 MS
P ONSET: 130 MS
PR INTERVAL: 174 MS
Q ONSET: 217 MS
QRS COUNT: 12 BEATS
QRS DURATION: 104 MS
QT INTERVAL: 406 MS
QTC CALCULATION(BAZETT): 438 MS
QTC FREDERICIA: 427 MS
R AXIS: -22 DEGREES
T AXIS: 110 DEGREES
T OFFSET: 420 MS
VENTRICULAR RATE: 70 BPM

## 2024-04-09 ENCOUNTER — PHARMACY VISIT (OUTPATIENT)
Dept: PHARMACY | Facility: CLINIC | Age: 54
End: 2024-04-09
Payer: COMMERCIAL

## 2024-04-09 ENCOUNTER — SPECIALTY PHARMACY (OUTPATIENT)
Dept: PHARMACY | Facility: CLINIC | Age: 54
End: 2024-04-09

## 2024-04-09 DIAGNOSIS — Z77.21 PERSONAL HISTORY OF EXPOSURE TO POTENTIALLY HAZARDOUS BODY FLUIDS: ICD-10-CM

## 2024-04-09 PROCEDURE — RXMED WILLOW AMBULATORY MEDICATION CHARGE

## 2024-04-09 RX ORDER — EMTRICITABINE AND TENOFOVIR DISOPROXIL FUMARATE 200; 300 MG/1; MG/1
1 TABLET, FILM COATED ORAL DAILY
Qty: 30 TABLET | Refills: 0 | Status: SHIPPED | OUTPATIENT
Start: 2024-04-09 | End: 2024-05-01 | Stop reason: SDUPTHER

## 2024-04-18 ENCOUNTER — HOSPITAL ENCOUNTER (OUTPATIENT)
Dept: CARDIOLOGY | Facility: CLINIC | Age: 54
Discharge: HOME | End: 2024-04-18
Payer: COMMERCIAL

## 2024-04-18 DIAGNOSIS — R01.1 HEART MURMUR, SYSTOLIC: ICD-10-CM

## 2024-04-18 LAB
EJECTION FRACTION APICAL 4 CHAMBER: 63.4
GLOBAL LONGITUDINAL STRAIN: -19.3 %
LEFT ATRIUM VOLUME AREA LENGTH INDEX BSA: 32.6 ML/M2
LEFT VENTRICLE INTERNAL DIMENSION DIASTOLE: 4.14 CM (ref 3.5–6)
LEFT VENTRICULAR OUTFLOW TRACT DIAMETER: 2.15 CM
LV EJECTION FRACTION BIPLANE: 64 %
MITRAL VALVE E/A RATIO: 0.3
MITRAL VALVE E/E' RATIO: 5.46
RIGHT VENTRICLE FREE WALL PEAK S': 0.18 CM/S
TRICUSPID ANNULAR PLANE SYSTOLIC EXCURSION: 2.8 CM

## 2024-04-18 PROCEDURE — 93356 MYOCRD STRAIN IMG SPCKL TRCK: CPT | Performed by: INTERNAL MEDICINE

## 2024-04-18 PROCEDURE — 93356 MYOCRD STRAIN IMG SPCKL TRCK: CPT

## 2024-04-18 PROCEDURE — 2500000004 HC RX 250 GENERAL PHARMACY W/ HCPCS (ALT 636 FOR OP/ED): Performed by: INTERNAL MEDICINE

## 2024-04-18 PROCEDURE — 93306 TTE W/DOPPLER COMPLETE: CPT | Performed by: INTERNAL MEDICINE

## 2024-04-18 RX ADMIN — PERFLUTREN 3.5 ML OF DILUTION: 6.52 INJECTION, SUSPENSION INTRAVENOUS at 15:50

## 2024-04-19 ENCOUNTER — TELEPHONE (OUTPATIENT)
Dept: CARDIOLOGY | Facility: HOSPITAL | Age: 54
End: 2024-04-19

## 2024-04-19 NOTE — TELEPHONE ENCOUNTER
Please schedule pt to see me the week i get back from [facility] [place]ivew his echo  SDH

## 2024-04-27 DIAGNOSIS — E78.5 HYPERLIPIDEMIA, UNSPECIFIED: ICD-10-CM

## 2024-04-29 RX ORDER — ATORVASTATIN CALCIUM 20 MG/1
20 TABLET, FILM COATED ORAL DAILY
Qty: 90 TABLET | Refills: 3 | Status: SHIPPED | OUTPATIENT
Start: 2024-04-29

## 2024-04-30 ENCOUNTER — DOCUMENTATION (OUTPATIENT)
Dept: IMMUNOLOGY | Facility: CLINIC | Age: 54
End: 2024-04-30
Payer: COMMERCIAL

## 2024-04-30 ENCOUNTER — OFFICE VISIT (OUTPATIENT)
Dept: IMMUNOLOGY | Facility: CLINIC | Age: 54
End: 2024-04-30
Payer: COMMERCIAL

## 2024-04-30 VITALS
HEIGHT: 75 IN | TEMPERATURE: 98.2 F | DIASTOLIC BLOOD PRESSURE: 85 MMHG | HEART RATE: 62 BPM | WEIGHT: 243.6 LBS | BODY MASS INDEX: 30.29 KG/M2 | SYSTOLIC BLOOD PRESSURE: 127 MMHG | RESPIRATION RATE: 16 BRPM | OXYGEN SATURATION: 97 %

## 2024-04-30 DIAGNOSIS — Z77.21 PERSONAL HISTORY OF EXPOSURE TO POTENTIALLY HAZARDOUS BODY FLUIDS: ICD-10-CM

## 2024-04-30 LAB
ALBUMIN SERPL BCP-MCNC: 4.5 G/DL (ref 3.4–5)
ANION GAP SERPL CALC-SCNC: 14 MMOL/L (ref 10–20)
BUN SERPL-MCNC: 16 MG/DL (ref 6–23)
CALCIUM SERPL-MCNC: 9.6 MG/DL (ref 8.6–10.6)
CHLORIDE SERPL-SCNC: 105 MMOL/L (ref 98–107)
CO2 SERPL-SCNC: 26 MMOL/L (ref 21–32)
CREAT SERPL-MCNC: 0.95 MG/DL (ref 0.5–1.3)
EGFRCR SERPLBLD CKD-EPI 2021: >90 ML/MIN/1.73M*2
GLUCOSE SERPL-MCNC: 69 MG/DL (ref 74–99)
HIV 1+2 AB+HIV1 P24 AG SERPL QL IA: NONREACTIVE
PHOSPHATE SERPL-MCNC: 3.5 MG/DL (ref 2.5–4.9)
POTASSIUM SERPL-SCNC: 4 MMOL/L (ref 3.5–5.3)
SODIUM SERPL-SCNC: 141 MMOL/L (ref 136–145)
TREPONEMA PALLIDUM IGG+IGM AB [PRESENCE] IN SERUM OR PLASMA BY IMMUNOASSAY: NONREACTIVE

## 2024-04-30 PROCEDURE — 87389 HIV-1 AG W/HIV-1&-2 AB AG IA: CPT | Performed by: NURSE PRACTITIONER

## 2024-04-30 PROCEDURE — 36415 COLL VENOUS BLD VENIPUNCTURE: CPT | Performed by: NURSE PRACTITIONER

## 2024-04-30 PROCEDURE — 87800 DETECT AGNT MULT DNA DIREC: CPT | Performed by: NURSE PRACTITIONER

## 2024-04-30 PROCEDURE — 3079F DIAST BP 80-89 MM HG: CPT | Performed by: NURSE PRACTITIONER

## 2024-04-30 PROCEDURE — 80069 RENAL FUNCTION PANEL: CPT | Performed by: NURSE PRACTITIONER

## 2024-04-30 PROCEDURE — 99215 OFFICE O/P EST HI 40 MIN: CPT | Performed by: NURSE PRACTITIONER

## 2024-04-30 PROCEDURE — 3074F SYST BP LT 130 MM HG: CPT | Performed by: NURSE PRACTITIONER

## 2024-04-30 PROCEDURE — 86780 TREPONEMA PALLIDUM: CPT | Performed by: NURSE PRACTITIONER

## 2024-04-30 RX ORDER — BISMUTH SUBSALICYLATE 262 MG
1 TABLET,CHEWABLE ORAL DAILY
COMMUNITY

## 2024-04-30 RX ORDER — ESOMEPRAZOLE MAGNESIUM 40 MG/1
40 CAPSULE, DELAYED RELEASE ORAL
COMMUNITY

## 2024-04-30 ASSESSMENT — ENCOUNTER SYMPTOMS
PSYCHIATRIC NEGATIVE: 1
GASTROINTESTINAL NEGATIVE: 1
ALLERGIC/IMMUNOLOGIC NEGATIVE: 1
EYES NEGATIVE: 1
CONSTITUTIONAL NEGATIVE: 1
HEMATOLOGIC/LYMPHATIC NEGATIVE: 1
ENDOCRINE NEGATIVE: 1
RESPIRATORY NEGATIVE: 1

## 2024-04-30 ASSESSMENT — PAIN SCALES - GENERAL: PAINLEVEL: 0-NO PAIN

## 2024-04-30 NOTE — LETTER
04/30/24    Antonio Perera DO  [address]  [address]  [facility] [address]      Dear Dr. Antonio Perera DO,    I am writing to confirm that your patient, SAIMA Dominique, received care in my office on 04/30/24. I have enclosed a summary of the care provided to CARISSAJulita for your reference.    Please contact me with any questions you may have regarding the visit.    Sincerely,         Renate Hammer, APRN-CNP  [address]  [address]  [facility] [address] [phone]    CC: No Recipients

## 2024-04-30 NOTE — PROGRESS NOTES
Met with PT, he had no specific needs from me.   No need for MATTEO or Copay assistance.  Renate discussed Doxy PEP and Mpox with PT.    PT did not need any protection.  PT is scheduled for  6 month with Renate and will receive 3 month labs in the main building.      Osmin Barrientos  HIV Prevention Specialist

## 2024-04-30 NOTE — PROGRESS NOTES
HIV PrEP Clinic Follow-up Visit:    Blake Dominique is a 54 y.o. male being seen for PrEP for prevention of HIV infection.  Current PrEP therapy:  Truvada    Risk factors for HIV infection include: (select all that apply):  MSM    Regular condom use? Always  Received treatment for STD since last seen? No    Past Medical History  Past Medical History:   Diagnosis Date    Contact with and (suspected) exposure to potentially hazardous body fluids 09/17/2020    Personal history of exposure to potentially hazardous body fluids    Decreased white blood cell count, unspecified 09/17/2020    Leukopenia    Impacted cerumen, bilateral 09/17/2020    Impacted cerumen of both ears       Allergies  Allergies   Allergen Reactions    Lisinopril Cough       Current Medications:    Current Outpatient Medications:     amLODIPine (Norvasc) 10 mg tablet, TAKE 1 TABLET BY MOUTH EVERY DAY, Disp: 90 tablet, Rfl: 3    ascorbic acid (Vitamin C) 500 mg chewable tablet, GNP Vitamin C 500 MG Oral Tablet  Refills: 0      Start : 2-Nov-2020  Active, Disp: , Rfl:     atorvastatin (Lipitor) 20 mg tablet, TAKE 1 TABLET BY MOUTH EVERY DAY, Disp: 90 tablet, Rfl: 3    candesartan (Atacand) 16 mg tablet, TAKE 1 TABLET BY MOUTH EVERY DAY, Disp: 90 tablet, Rfl: 3    emtricitabine-tenofovir, TDF, (Truvada) 200-300 mg tablet, Take 1 tablet by mouth once daily., Disp: 30 tablet, Rfl: 0    escitalopram (Lexapro) 10 mg tablet, Take 1 tablet (10 mg) by mouth once daily., Disp: 30 tablet, Rfl: 2    esomeprazole (NexIUM) 40 mg DR capsule, Take 1 capsule (40 mg) by mouth once daily in the morning. Take before meals. Do not open capsule., Disp: , Rfl:     fish oil concentrate (Omega-3) 120-180 mg capsule, Take 1 capsule (1 g) by mouth once daily., Disp: , Rfl:     chlorthalidone (Hygroton) 25 mg tablet, TAKE 1 TABLET BY MOUTH EVERY DAY (Patient not taking: Reported on 4/30/2024), Disp: 90 tablet, Rfl: 3    multivitamin tablet, Take 1 tablet by mouth once daily.,  "Disp: , Rfl:     tadalafil (Cialis) 10 mg tablet, Tadalafil 10 MG Oral Tablet  Refills: 0     Active, Disp: , Rfl:     Immunizations:  Immunization History   Administered Date(s) Administered    Hepatitis A vaccine, age 19 years and greater (HAVRIX) 10/01/2019    Influenza, Unspecified 09/11/2020    Influenza, seasonal, injectable 09/30/2023    Moderna COVID-19 vaccine, bivalent, blue cap/gray label *Check age/dose* 01/27/2023    Moderna SARS-CoV-2 Vaccination 01/04/2021, 02/01/2021, 11/03/2021    Pneumococcal polysaccharide vaccine, 23-valent, age 2 years and older (PNEUMOVAX 23) 08/31/2011    Small pox and monkeypox vaccine (Jynneos) *check dose/route* 08/21/2022, 10/04/2022    Tdap vaccine, age 7 year and older (BOOSTRIX, ADACEL) 01/15/2015, 02/01/2024    Zoster vaccine, recombinant, adult (SHINGRIX) 03/22/2022, 08/30/2022       Review of systems  Review of Systems   Constitutional: Negative.    HENT: Negative.     Eyes: Negative.    Respiratory: Negative.     Cardiovascular:         He has been told he has a heart murmur......does follow up with Cardio on Friday of this week.       He had felt 'woozy' at times.  Discussed changing positions carefully.     Gastrointestinal: Negative.    Endocrine: Negative.    Genitourinary: Negative.    Musculoskeletal:         Plantar faciitis pain...   Skin: Negative.    Allergic/Immunologic: Negative.    Neurological:         He has some feelings of being 'woozy' - does see cardiologist on Friday.    Hematological: Negative.    Psychiatric/Behavioral: Negative.         PHYSICAL EXAMINATION:  Visit Vitals  /85 (BP Location: Right arm, Patient Position: Sitting, BP Cuff Size: Adult)   Pulse 62   Temp 36.8 °C (98.2 °F) (Skin)   Resp 16   Ht 1.905 m (6' 3\")   Wt 110 kg (243 lb 9.6 oz)   SpO2 97%   BMI 30.45 kg/m²   Smoking Status Never   BSA 2.41 m²       Physical Exam   Physical Exam  Vitals reviewed.   Constitutional:       Appearance: Normal appearance.   HENT:      " "Head: Normocephalic.   Eyes:      Pupils: Pupils are equal, round, and reactive to light.   Cardiovascular:      Rate and Rhythm: Normal rate and regular rhythm.      Heart sounds: Normal heart sounds.      Comments: I was unable to hear a murmur during this exam   Pulmonary:      Effort: Pulmonary effort is normal.      Breath sounds: Normal breath sounds.   Abdominal:      General: Bowel sounds are normal.      Palpations: Abdomen is soft.   Musculoskeletal:         General: Normal range of motion.      Cervical back: Neck supple.   Skin:     General: Skin is warm and dry.   Neurological:      Mental Status: He is alert and oriented to person, place, and time.   Psychiatric:         Mood and Affect: Mood normal.         Pertinent data review:  HIV 1/2 Antigen/Antibody Screen with Reflex to Confirmation   Date Value Ref Range Status   02/03/2024 Nonreactive Nonreactive Final     Syphilis Total Ab   Date Value Ref Range Status   10/30/2023 Nonreactive Nonreactive Final     Comment:     No significant level of Treponema pallidum antibody detected.   Repeat testing in 2 to 4 weeks may be considered if early   infection or incubating syphilis infection is suspected.     No results found for: \"VDRLCSF\"  No results found for: \"RPR\"  Hepatitis C Ab   Date Value Ref Range Status   11/01/2019 NON-REACTIVE NONREACTIVE Final     Comment:      Patients receiving more than 5 mg/day of biotin may have interference   in test results.  A sample should be taken no sooner than eight hours   after  previous dose. Contact the testing laboratory for additional    information.        Hepatitis B Surface Ag   Date Value Ref Range Status   11/01/2019 NONREACTIVE NONREACTIVE Final     Comment:      Patients receiving more than 5 mg/day of biotin may have interference   in test results.  A sample should be taken no sooner than eight hours   after  previous dose. Contact the testing laboratory for additional   information.        Hepatitis B " Surface Ab   Date Value Ref Range Status   08/30/2021 <3.1 <10 mIU/mL Final     Comment:     INTERPRETIVE CRITERIA:  <10 mIU/mL....NONREACTIVE    >=10 mIU/mL...REACTIVE   .   Biotin interference may cause falsely decreased results.   Patients taking a Biotin dose of up to 5 mg/day should   refrain from taking Biotin for 24 hours before sample   collection. Providers may contact their local laboratory   for further information.       Hep A Total Ab Interp   Date Value Ref Range Status   11/01/2019 REACTIVE (A) NONREACTIVE Final     Comment:      Patients receiving more than 5 mg/day of biotin may have interference   in test results.  A sample should be taken no sooner than eight hours   after  previous dose. Contact [phone] for additional information.        Glucose   Date Value Ref Range Status   04/30/2024 69 (L) 74 - 99 mg/dL Final     Sodium   Date Value Ref Range Status   04/30/2024 141 136 - 145 mmol/L Final     Potassium   Date Value Ref Range Status   04/30/2024 4.0 3.5 - 5.3 mmol/L Final     Chloride   Date Value Ref Range Status   04/30/2024 105 98 - 107 mmol/L Final     Anion Gap   Date Value Ref Range Status   04/30/2024 14 10 - 20 mmol/L Final     Urea Nitrogen   Date Value Ref Range Status   04/30/2024 16 6 - 23 mg/dL Final     Creatinine   Date Value Ref Range Status   04/30/2024 0.95 0.50 - 1.30 mg/dL Final     eGFR   Date Value Ref Range Status   04/30/2024 >90 >60 mL/min/1.73m*2 Final     Comment:     Calculations of estimated GFR are performed using the 2021 CKD-EPI Study Refit equation without the race variable for the IDMS-Traceable creatinine methods.  https://jasn.asnjournals.org/content/early/2021/09/22/ASN.0917947598     Calcium   Date Value Ref Range Status   04/30/2024 9.6 8.6 - 10.6 mg/dL Final     Phosphorus   Date Value Ref Range Status   04/30/2024 3.5 2.5 - 4.9 mg/dL Final     Comment:     The performance characteristics of phosphorus testing in heparinized plasma have been  validated by the individual  laboratory site where testing is performed. Testing on heparinized plasma is not approved by the FDA; however, such approval is not necessary.     Albumin   Date Value Ref Range Status   04/30/2024 4.5 3.4 - 5.0 g/dL Final       Assessment and Plan:  Blake Dominique is a 54 y.o.male seen today for evaluation of appropriateness for continuation of  PrEP therapy as they continue to be at greater risk for acquiring HIV infection.  HIV Ag/Ab, Syphilis testing, and renal function will be collected today.  GC NAAT testing will be obtained from 1 site:  throat  If HIV testing negative, prescription for PrEP will be renewed.    He had mpoxx vaccines two years ago.  Discussed doxy pep - will order for him.   Problem List Items Addressed This Visit    None  Visit Diagnoses       Personal history of exposure to potentially hazardous body fluids        Relevant Orders    C. Trachomatis / N. Gonorrhoeae, Amplified Detection    C. Trachomatis / N. Gonorrhoeae, Amplified Detection    HIV 1/2 Antigen/Antibody Screen with Reflex to Confirmation    Renal Function Panel (Completed)    Syphilis Screen with Reflex        Thank you for coming in to see us today.   We are getting routine labs and swabs.   Labs again in 3 months; and we will see you in 6 months.  Remember to change positions slowly especially when feeling woozy.   Please call with any questions or concerns.     Renate Hammer, NICK-CNP

## 2024-05-01 DIAGNOSIS — Z77.21 PERSONAL HISTORY OF EXPOSURE TO POTENTIALLY HAZARDOUS BODY FLUIDS: ICD-10-CM

## 2024-05-01 RX ORDER — EMTRICITABINE AND TENOFOVIR DISOPROXIL FUMARATE 200; 300 MG/1; MG/1
1 TABLET, FILM COATED ORAL DAILY
Qty: 30 TABLET | Refills: 2 | Status: SHIPPED | OUTPATIENT
Start: 2024-05-01

## 2024-05-02 PROCEDURE — RXMED WILLOW AMBULATORY MEDICATION CHARGE

## 2024-05-03 ENCOUNTER — OFFICE VISIT (OUTPATIENT)
Dept: CARDIOLOGY | Facility: CLINIC | Age: 54
End: 2024-05-03
Payer: COMMERCIAL

## 2024-05-03 VITALS
DIASTOLIC BLOOD PRESSURE: 83 MMHG | HEART RATE: 61 BPM | BODY MASS INDEX: 30.34 KG/M2 | OXYGEN SATURATION: 96 % | WEIGHT: 244 LBS | SYSTOLIC BLOOD PRESSURE: 123 MMHG | HEIGHT: 75 IN

## 2024-05-03 DIAGNOSIS — Q24.8 LEFT VENTRICULAR OUTFLOW TRACT OBSTRUCTION (HHS-HCC): Primary | ICD-10-CM

## 2024-05-03 PROBLEM — I51.7 ASYMMETRIC SEPTAL HYPERTROPHY: Status: ACTIVE | Noted: 2024-05-03

## 2024-05-03 PROBLEM — I42.2 ASYMMETRIC SEPTAL HYPERTROPHY (MULTI): Status: ACTIVE | Noted: 2024-05-03

## 2024-05-03 PROCEDURE — 1036F TOBACCO NON-USER: CPT | Performed by: INTERNAL MEDICINE

## 2024-05-03 PROCEDURE — 3074F SYST BP LT 130 MM HG: CPT | Performed by: INTERNAL MEDICINE

## 2024-05-03 PROCEDURE — 99214 OFFICE O/P EST MOD 30 MIN: CPT | Performed by: INTERNAL MEDICINE

## 2024-05-03 PROCEDURE — 3079F DIAST BP 80-89 MM HG: CPT | Performed by: INTERNAL MEDICINE

## 2024-05-03 ASSESSMENT — PAIN SCALES - GENERAL: PAINLEVEL: 0-NO PAIN

## 2024-05-03 NOTE — PROGRESS NOTES
Name : Blake Dominique   : 1970   MRN : [id number]   ENC Date : 2024      Assessment and Plan:  Asymmetric left ventricular hypertrophy and left ventricular outflow tract obstruction: Patient is nearly asymptomatic.  He is able to exercise on a regular basis without syncope.  He has remote history of dyspnea with exertion with going up a flight of stairs rapidly and/or some dyspnea when bending over.  No syncopal events.  I reviewed with him again he has no family history of hypertrophic cardiomyopathy nor family history of sudden cardiac death.  He has mild to perhaps at worst moderate asymmetric septal hypertrophy.  I suspect he does not have hypertrophic cardiomyopathy.  Regardless I do think a cardiac MRI is warranted.  We had a long discussion today regarding beta-blockers.  His heart rate however is already only 60 bpm and his blood pressure is well-controlled.  Since he is for the most part asymptomatic I think we can hold off on beta-blocker therapy for now.  We will get a cardiac MRI and make further decisions from there.  If there is any hint of hypertrophic cardiomyopathy we will refer him to the [facility] clinic.  We discontinued his diuretic already.  I explained to him the rationale for avoiding dehydration and staying well-hydrated.  Disp: Phone follow-up with cardiac MRI results    HPI:  Patient returns today to review the results of his echocardiogram.  We discussed in detail.  He has mild to moderate asymmetric left ventricular septal hypertrophy with normal LVEF and a resting gradient of 70 mmHg.    Problem list overview:   Patient Active Problem List   Diagnosis    Abnormal EKG    Anxiety and depression    Chest heaviness    Dysphagia, pharyngoesophageal phase    Eosinophilic esophagitis    Food impaction of esophagus    Gilbert's syndrome    Heart murmur on physical examination    Heart murmur, systolic    Hiatal hernia    Insomnia    HLD (hyperlipidemia)    Hypertension,  essential, benign    Hypokalemia    Hearing loss of both ears    Class 1 obesity with body mass index (BMI) of 31.0 to 31.9 in adult    Vitamin D deficiency    Left ventricular outflow tract obstruction (HHS-HCC)    Asymmetric septal hypertrophy (Multi)       Meds:   Current Outpatient Medications on File Prior to Visit   Medication Sig Dispense Refill    amLODIPine (Norvasc) 10 mg tablet TAKE 1 TABLET BY MOUTH EVERY DAY 90 tablet 3    ascorbic acid (Vitamin C) 500 mg chewable tablet GNP Vitamin C 500 MG Oral Tablet   Refills: 0        Start : 2-Nov-2020   Active      atorvastatin (Lipitor) 20 mg tablet TAKE 1 TABLET BY MOUTH EVERY DAY 90 tablet 3    candesartan (Atacand) 16 mg tablet TAKE 1 TABLET BY MOUTH EVERY DAY 90 tablet 3    emtricitabine-tenofovir, TDF, (Truvada) 200-300 mg tablet Take 1 tablet by mouth once daily. 30 tablet 2    escitalopram (Lexapro) 10 mg tablet Take 1 tablet (10 mg) by mouth once daily. 30 tablet 2    esomeprazole (NexIUM) 40 mg DR capsule Take 1 capsule (40 mg) by mouth once daily in the morning. Take before meals. Do not open capsule.      fish oil concentrate (Omega-3) 120-180 mg capsule Take 1 capsule (1 g) by mouth once daily.      multivitamin tablet Take 1 tablet by mouth once daily.      tadalafil (Cialis) 10 mg tablet Tadalafil 10 MG Oral Tablet   Refills: 0       Active      [DISCONTINUED] atorvastatin (Lipitor) 20 mg tablet TAKE 1 TABLET BY MOUTH EVERY DAY 90 tablet 0    [DISCONTINUED] chlorthalidone (Hygroton) 25 mg tablet TAKE 1 TABLET BY MOUTH EVERY DAY (Patient not taking: Reported on 4/30/2024) 90 tablet 3    [DISCONTINUED] emtricitabine-tenofovir, TDF, (Truvada) 200-300 mg tablet Take 1 tablet by mouth once daily. 30 tablet 0    [DISCONTINUED] omeprazole (PriLOSEC) 40 mg DR capsule Take 1 capsule (40 mg) by mouth once daily in the morning. Take before meals.       No current facility-administered medications on file prior to visit.        VS:  /83 (BP Location:  "Left arm, Patient Position: Sitting)   Pulse 61   Ht 1.905 m (6' 3\")   Wt 111 kg (244 lb)   SpO2 96%   BMI 30.50 kg/m²     Vitals reviewed.   Neck:      Vascular: No JVD.   Pulmonary:      Breath sounds: Normal breath sounds.   Cardiovascular:      Normal rate. Regular rhythm.      Murmurs: There is a grade 1/6 systolic murmur.      No gallop.    Edema:     Peripheral edema absent.   Abdominal:      General: Abdomen is flat.      Palpations: Abdomen is soft.   Skin:     General: Skin is warm.     Results for orders placed during the hospital encounter of 04/18/24    Transthoracic echo (TTE) complete    Narrative  [facility], [address] [place] [address]  Tel [phone] and Fax [phone]    TRANSTHORACIC ECHOCARDIOGRAM REPORT      Patient Name:      BRITTNEE CAMERON MASTERS     Reading Physician:    [id number] Lazo MD  Study Date:        4/18/2024            Ordering Provider:    [id number] LAURA LAZO  MRN/PID:           [id number]             Fellow:  Accession#:        [id number]         Nurse:  Date of Birth/Age: 1970 / 54 years Sonographer:          Andrea Bliss RDCS  Gender:            M                    Additional Staff:  Height:            193.04 cm            Admit Date:  Weight:            108.41 kg            Admission Status:     Outpatient  BSA / BMI:         2.39 m2 / 29.09      Encounter#:           [id number]  kg/m2  Department Location:  [facility]  Blood Pressure: 91 /62 mmHg    Study Type:    TRANSTHORACIC ECHO (TTE) COMPLETE  Diagnosis/ICD: Cardiac murmur, unspecified-R01.1  Indication:    Murmur  CPT Code:      Echo Complete w Full [id number]    Patient History:  Pertinent History: Abnormal EKG, murmur, HTN, HLD.    Study Detail: The following Echo studies were performed: 2D, M-Mode, Doppler and  color flow. Definity used as a contrast agent for endocardial  border definition. Total contrast used for this procedure was 3.5  mL via IV " push.      PHYSICIAN INTERPRETATION:  Left Ventricle: The left ventricular systolic function is normal, with an estimated ejection fraction of 70%. There are no regional wall motion abnormalities. The left ventricular cavity size is normal. There is mild asymmetric left ventricular hypertrophy involving the septal wall. Left Ventricular Global Longitudinal Strain - -19.3 %. Spectral Doppler shows an impaired relaxation pattern of left ventricular diastolic filling. There is a severe left ventricular outflow tract obstruction. The resting gradient is 72 mmHg.  Left Atrium: The left atrium is mildly dilated.  Right Ventricle: The right ventricle is normal in size. There is normal right ventricular global systolic function.  Right Atrium: The right atrium is normal in size.  Aortic Valve: The aortic valve is trileaflet. There is no evidence of aortic valve regurgitation.  Mitral Valve: The mitral valve is mild to moderately thickened. There is moderate mitral annular calcification. There is systolic anterior motion with obstructive septal contact. There is moderate mitral valve regurgitation which is posteriorly directed.  Tricuspid Valve: The tricuspid valve is structurally normal. No evidence of tricuspid regurgitation. The right ventricular systolic pressure is unable to be estimated.  Pulmonic Valve: The pulmonic valve is structurally normal. There is no indication of pulmonic valve regurgitation.  Pericardium: There is no pericardial effusion noted.  Aorta: The aortic root is normal.      CONCLUSIONS:  1. Left ventricular systolic function is normal with a 70% estimated ejection fraction.  2. Spectral Doppler shows an impaired relaxation pattern of left ventricular diastolic filling.  3. There is mild asymmetric left ventricular hypertrophy.  4. There is moderate mitral annular calcification.  5. Moderate mitral valve regurgitation.  6. There is a severe left ventricular outflow tract obstruction.    QUANTITATIVE  DATA SUMMARY:  2D MEASUREMENTS:  Normal Ranges:  IVSd:          1.60 cm   (0.6-1.1cm)  LVPWd:         1.11 cm   (0.6-1.1cm)  LVIDd:         4.14 cm   (3.9-5.9cm)  LVIDs:         2.09 cm  LV Mass Index: 87.4 g/m2  LV % FS        49.5 %    LA VOLUME:  Normal Ranges:  LA Vol A4C:        72.9 ml    (22+/-6mL/m2)  LA Vol A2C:        80.3 ml  LA Vol BP:         77.8 ml  LA Vol Index A4C:  30.5ml/m2  LA Vol Index A2C:  33.6 ml/m2  LA Vol Index BP:   32.6 ml/m2  LA Area A4C:       22.5 cm2  LA Area A2C:       24.0 cm2  LA Major Axis A4C: 5.9 cm  LA Major Axis A2C: 6.1 cm  LA Vol A4C:        68.3 ml  LA Vol A2C:        77.4 ml    RA VOLUME BY A/L METHOD:  Normal Ranges:  RA Area A4C: 15.1 cm2    AORTA MEASUREMENTS:  Normal Ranges:  Ao Sinus, d: 3.70 cm (2.1-3.5cm)  Ao STJ, d:   2.90 cm (1.7-3.4cm)  Asc Ao, d:   3.20 cm (2.1-3.4cm)    LV SYSTOLIC FUNCTION BY 2D PLANIMETRY (MOD):  Normal Ranges:  EF-A4C View:                      63.4 %  (>=55%)  EF-A2C View:                      61.4 %  EF-Biplane:                       64.0 %  Global Longitudinal Strain (GLS): -19.3 %    LV DIASTOLIC FUNCTION:  Normal Ranges:  MV Peak E:    0.44 m/s    (0.7-1.2 m/s)  MV Peak A:    1.44 m/s    (0.42-0.7 m/s)  E/A Ratio:    0.30        (1.0-2.2)  MV e'         0.08 m/s    (>8.0)  MV lateral e' 0.08 m/s  MV medial e'  0.07 m/s  MV A Dur:     128.03 msec  E/e' Ratio:   5.46        (<8.0)    MITRAL VALVE:  Normal Ranges:  MV DT: 222 msec (150-240msec)    AORTIC VALVE:  Normal Ranges:  LVOT Diameter: 2.15 cm (1.8-2.4cm)      RIGHT VENTRICLE:  RV Basal 3.70 cm  RV Mid   2.60 cm  RV Major 8.2 cm  TAPSE:   28.0 mm  RV s'    0.00 m/s    PULMONIC VALVE:  Normal Ranges:  PV Accel Time: 93 msec  (>120ms)  PV Max Javi:    1.0 m/s  (0.6-0.9m/s)  PV Max P.4 mmHg      [id number] Kaiden Miles MD  Electronically signed on 2024 at 5:25:42 PM        ** Final **        Kaiden Miles MD

## 2024-05-06 DIAGNOSIS — F32.A ANXIETY AND DEPRESSION: ICD-10-CM

## 2024-05-06 DIAGNOSIS — F41.9 ANXIETY AND DEPRESSION: ICD-10-CM

## 2024-05-06 RX ORDER — ESCITALOPRAM OXALATE 10 MG/1
10 TABLET ORAL DAILY
Qty: 90 TABLET | Refills: 3 | Status: SHIPPED | OUTPATIENT
Start: 2024-05-06 | End: 2024-05-13

## 2024-05-10 ENCOUNTER — SPECIALTY PHARMACY (OUTPATIENT)
Dept: PHARMACY | Facility: CLINIC | Age: 54
End: 2024-05-10

## 2024-05-13 ENCOUNTER — PHARMACY VISIT (OUTPATIENT)
Dept: PHARMACY | Facility: CLINIC | Age: 54
End: 2024-05-13
Payer: COMMERCIAL

## 2024-05-13 ENCOUNTER — OFFICE VISIT (OUTPATIENT)
Dept: PRIMARY CARE | Facility: CLINIC | Age: 54
End: 2024-05-13
Payer: COMMERCIAL

## 2024-05-13 VITALS
TEMPERATURE: 98.7 F | OXYGEN SATURATION: 98 % | DIASTOLIC BLOOD PRESSURE: 77 MMHG | HEART RATE: 92 BPM | HEIGHT: 75 IN | BODY MASS INDEX: 30.09 KG/M2 | SYSTOLIC BLOOD PRESSURE: 114 MMHG | RESPIRATION RATE: 18 BRPM | WEIGHT: 242 LBS

## 2024-05-13 DIAGNOSIS — I42.2 ASYMMETRIC SEPTAL HYPERTROPHY (MULTI): ICD-10-CM

## 2024-05-13 DIAGNOSIS — Z77.21 PERSONAL HISTORY OF EXPOSURE TO POTENTIALLY HAZARDOUS BODY FLUIDS: Primary | ICD-10-CM

## 2024-05-13 DIAGNOSIS — K42.9 UMBILICAL HERNIA WITHOUT OBSTRUCTION AND WITHOUT GANGRENE: Primary | ICD-10-CM

## 2024-05-13 DIAGNOSIS — F41.9 ANXIETY AND DEPRESSION: ICD-10-CM

## 2024-05-13 DIAGNOSIS — E78.5 HYPERLIPIDEMIA, UNSPECIFIED HYPERLIPIDEMIA TYPE: ICD-10-CM

## 2024-05-13 DIAGNOSIS — E66.09 CLASS 1 OBESITY DUE TO EXCESS CALORIES WITH SERIOUS COMORBIDITY AND BODY MASS INDEX (BMI) OF 30.0 TO 30.9 IN ADULT: ICD-10-CM

## 2024-05-13 DIAGNOSIS — I10 HYPERTENSION, ESSENTIAL, BENIGN: ICD-10-CM

## 2024-05-13 DIAGNOSIS — F32.A ANXIETY AND DEPRESSION: ICD-10-CM

## 2024-05-13 PROCEDURE — 3074F SYST BP LT 130 MM HG: CPT | Performed by: FAMILY MEDICINE

## 2024-05-13 PROCEDURE — 99214 OFFICE O/P EST MOD 30 MIN: CPT | Performed by: FAMILY MEDICINE

## 2024-05-13 PROCEDURE — 3008F BODY MASS INDEX DOCD: CPT | Performed by: FAMILY MEDICINE

## 2024-05-13 PROCEDURE — 3078F DIAST BP <80 MM HG: CPT | Performed by: FAMILY MEDICINE

## 2024-05-13 RX ORDER — ESCITALOPRAM OXALATE 20 MG/1
20 TABLET ORAL DAILY
Qty: 90 TABLET | Refills: 3 | Status: SHIPPED | OUTPATIENT
Start: 2024-05-13

## 2024-05-13 RX ORDER — DOXYCYCLINE HYCLATE 100 MG
TABLET ORAL
Qty: 30 TABLET | Refills: 0 | Status: SHIPPED | OUTPATIENT
Start: 2024-05-13

## 2024-05-13 ASSESSMENT — ENCOUNTER SYMPTOMS
HYPERTENSION: 1
HEADACHES: 0
SHORTNESS OF BREATH: 0

## 2024-05-13 NOTE — PROGRESS NOTES
"Vanna Fowlers \"E.J.\" is a 54 y.o. male who presents for Hypertension, Weight Loss, and Hernia.    Hypertension  Associated symptoms include anxiety. Pertinent negatives include no chest pain, headaches or shortness of breath.      He recently saw his cardiologist to discuss his echo from 4/18/24 which showed showed mild asymmetric LVH and severe left ventricular outflow tract obstruction so he will be getting a cardiac MRI.  They discussed beta blocker treatment however held off until the MRI cardiac results are back.      He intentionally lost weight (approximately 25 lbs since Feb 2024).  He joined [employer] and is going to the gym regularly - resistance training and some cardio.  His goal weight is < 220lbs.  He tracks his calories with an cheyanne on his phone.  He also gets dieting advice through weight watchers. He is food prepping more.     He is also here to due to concern for possible umbilical hernia.  He noticed a bulge in his umbilicus approximately two months ago.  He sometimes notices some discomfort when the umbilical hernia \"pops out\".  He is able to reduce the hernia. No fevers or chills.    He already made an appointment with  general surgery, Dr. Patrick, for 5/29/24.  He is requesting a formal referral which is reasonable.     Pt also reports that his mood has improved on lexapro 10 mg daily  (which he started in Feb 2024) however pt requesting increase in dose to help improve mood more.  He denies side effects to lexapro.  Patient denies any suicidal or homicidal ideation or plan.  He does not see a mental health therapist.      Review of Systems   Respiratory:  Negative for shortness of breath.    Cardiovascular:  Negative for chest pain.   Neurological:  Negative for headaches.       Objective     Vitals:    05/13/24 1516   BP: 114/77   BP Location: Left arm   Patient Position: Sitting   Pulse: 92   Resp: 18   Temp: 37.1 °C (98.7 °F)   TempSrc: Temporal   SpO2: 98%   Weight: " "110 kg (242 lb)   Height: 1.905 m (6' 3\")        Current Outpatient Medications   Medication Instructions    amLODIPine (Norvasc) 10 mg tablet TAKE 1 TABLET BY MOUTH EVERY DAY    ascorbic acid (Vitamin C) 500 mg chewable tablet GNP Vitamin C 500 MG Oral Tablet   Refills: 0        Start : 2-Nov-2020   Active    atorvastatin (LIPITOR) 20 mg, oral, Daily    candesartan (ATACAND) 16 mg, oral, Daily    doxycycline (Vibra-Tabs) 100 mg tablet Take 2 tablets by mouth within 24-72 hours after unprotected sexual encounter. Take with a full glass of water and do not lie down for at least 30 minutes after.    emtricitabine-tenofovir, TDF, (Truvada) 200-300 mg tablet 1 tablet, oral, Daily    escitalopram (LEXAPRO) 20 mg, oral, Daily    esomeprazole (NEXIUM) 40 mg, oral, Daily before breakfast, Do not open capsule.    fish oil concentrate (Omega-3) 120-180 mg capsule 1 capsule, oral, Daily    multivitamin tablet 1 tablet, oral, Daily    tadalafil (Cialis) 10 mg tablet Tadalafil 10 MG Oral Tablet   Refills: 0       Active        Allergies   Allergen Reactions    Lisinopril Cough        Past Surgical History:   Procedure Laterality Date    OTHER SURGICAL HISTORY  08/27/2020    Hernia repair    OTHER SURGICAL HISTORY  08/27/2020    Tonsillectomy        Social History     Tobacco Use    Smoking status: Never    Smokeless tobacco: Never   Vaping Use    Vaping status: Never Used   Substance Use Topics    Alcohol use: Yes     Alcohol/week: 4.0 standard drinks of alcohol     Types: 4 Standard drinks or equivalent per week    Drug use: Never        Social History     Substance and Sexual Activity   Alcohol Use Yes    Alcohol/week: 4.0 standard drinks of alcohol    Types: 4 Standard drinks or equivalent per week       Family History   Problem Relation Name Age of Onset    Dementia Mother      Other (Cardiac disorder) Father      Stroke Father      Colon cancer Other cousin         Immunization History   Administered Date(s) Administered    " Hepatitis A vaccine, age 19 years and greater (HAVRIX) 10/01/2019    Influenza, Unspecified 09/11/2020    Influenza, seasonal, injectable 09/30/2023    Moderna COVID-19 vaccine, bivalent, blue cap/gray label *Check age/dose* 01/27/2023    Moderna SARS-CoV-2 Vaccination 01/04/2021, 02/01/2021, 11/03/2021    Pneumococcal polysaccharide vaccine, 23-valent, age 2 years and older (PNEUMOVAX 23) 08/31/2011    Small pox and monkeypox vaccine (Jynneos) *check dose/route* 08/21/2022, 10/04/2022    Tdap vaccine, age 7 year and older (BOOSTRIX, ADACEL) 01/15/2015, 02/01/2024    Zoster vaccine, recombinant, adult (SHINGRIX) 03/22/2022, 08/30/2022        Physical Exam  Vitals reviewed.   Constitutional:       General: He is not in acute distress.     Appearance: Normal appearance. He is well-developed.   HENT:      Head: Normocephalic and atraumatic.   Eyes:      General: Lids are normal.      Conjunctiva/sclera:      Right eye: Right conjunctiva is not injected.      Left eye: Left conjunctiva is not injected.   Cardiovascular:      Rate and Rhythm: Normal rate and regular rhythm.      Heart sounds: Murmur heard.   Pulmonary:      Effort: Pulmonary effort is normal. No respiratory distress.      Breath sounds: Normal breath sounds. No wheezing, rhonchi or rales.   Abdominal:      General: Abdomen is flat. There is no distension.      Palpations: Abdomen is soft.      Tenderness: There is no guarding.      Hernia: A hernia (umbilical - reducible, mildly ttp) is present.   Skin:     General: Skin is warm and dry.      Findings: No rash.   Neurological:      Mental Status: He is alert and oriented to person, place, and time. Mental status is at baseline.   Psychiatric:         Mood and Affect: Mood normal.         Behavior: Behavior normal.         Problem List Items Addressed This Visit       Anxiety and depression    Relevant Medications    escitalopram (Lexapro) 20 mg tablet    HLD (hyperlipidemia)    Hypertension, essential,  benign    Class 1 obesity due to excess calories with serious comorbidity and body mass index (BMI) of 30.0 to 30.9 in adult    Asymmetric septal hypertrophy (Multi)     Sees  cardiology , cardiac MRI ordered at cardio visit from 5/3/24          Umbilical hernia without obstruction and without gangrene - Primary    Relevant Orders    Referral to General Surgery       Assessment/Plan        Anxiety/depression - mood is improved on lexapro 10 mg daily (which was started in Feb 2024) however pt requesting increase in dose to help improve mood more.  I will increase to lexapro 20 mg daily.  Pt agreeable with plan.       Hypertension - controlled     HLD,hypertrig - on statin and fish oil    Umbilical hernia - tender, reducible - pt will be seeing general surgery.  Referral provided.     Obesity - intentional weight loss of 25 lbs since feb 2024, nice job.  Keep it up.  Pt is in weight watchers program - online.      Septal hypertrophy, mild asymmetric LVH and severe left ventricular outflow tract obstruction -noted on recent echo.  pt sees  cardiology who ordered an cardiac MRI.      Follow up 3 months or sooner if needed

## 2024-05-23 ENCOUNTER — APPOINTMENT (OUTPATIENT)
Dept: SURGERY | Facility: CLINIC | Age: 54
End: 2024-05-23
Payer: COMMERCIAL

## 2024-05-27 DIAGNOSIS — Z77.21 PERSONAL HISTORY OF EXPOSURE TO POTENTIALLY HAZARDOUS BODY FLUIDS: ICD-10-CM

## 2024-05-29 ENCOUNTER — OFFICE VISIT (OUTPATIENT)
Dept: SURGERY | Facility: CLINIC | Age: 54
End: 2024-05-29
Payer: COMMERCIAL

## 2024-05-29 VITALS
TEMPERATURE: 97.5 F | SYSTOLIC BLOOD PRESSURE: 124 MMHG | HEART RATE: 88 BPM | BODY MASS INDEX: 31.13 KG/M2 | HEIGHT: 74 IN | DIASTOLIC BLOOD PRESSURE: 83 MMHG | WEIGHT: 242.6 LBS

## 2024-05-29 DIAGNOSIS — K42.9 UMBILICAL HERNIA WITHOUT OBSTRUCTION AND WITHOUT GANGRENE: Primary | ICD-10-CM

## 2024-05-29 PROCEDURE — 3074F SYST BP LT 130 MM HG: CPT | Performed by: SURGERY

## 2024-05-29 PROCEDURE — 99204 OFFICE O/P NEW MOD 45 MIN: CPT | Performed by: SURGERY

## 2024-05-29 PROCEDURE — 99214 OFFICE O/P EST MOD 30 MIN: CPT | Performed by: SURGERY

## 2024-05-29 PROCEDURE — 3008F BODY MASS INDEX DOCD: CPT | Performed by: SURGERY

## 2024-05-29 PROCEDURE — 1036F TOBACCO NON-USER: CPT | Performed by: SURGERY

## 2024-05-29 PROCEDURE — 3079F DIAST BP 80-89 MM HG: CPT | Performed by: SURGERY

## 2024-05-29 RX ORDER — SODIUM CHLORIDE, SODIUM LACTATE, POTASSIUM CHLORIDE, CALCIUM CHLORIDE 600; 310; 30; 20 MG/100ML; MG/100ML; MG/100ML; MG/100ML
100 INJECTION, SOLUTION INTRAVENOUS CONTINUOUS
OUTPATIENT
Start: 2024-05-29

## 2024-05-29 RX ORDER — CEFAZOLIN SODIUM 2 G/100ML
2 INJECTION, SOLUTION INTRAVENOUS ONCE
OUTPATIENT
Start: 2024-05-29 | End: 2024-05-29

## 2024-05-29 ASSESSMENT — PAIN SCALES - GENERAL: PAINLEVEL: 0-NO PAIN

## 2024-05-29 ASSESSMENT — ENCOUNTER SYMPTOMS: DEPRESSION: 0

## 2024-05-29 NOTE — PROGRESS NOTES
History Of Present Illness  SAIMA Dominique is a 54 y.o. male presenting with increasingly uncomfortable periumbilical bulge.  He says he is able to reduce it at the end of the day.  Tends to pop out when he is exercising or doing any core muscle work.  When this occurs it does cause him some discomfort.  He is a cardiac intensive care nurse.  Works at [employer].  He is on some medicine for hypertension and hyperlipidemia.  Also on Truvada and some antidepressants.  He also is scheduled for a cardiac MRI tomorrow to rule out hypertrophic cardiomyopathy.  He is seeing Dr. Miles from cardiology on the [place].     Past Medical History  Past Medical History:   Diagnosis Date    Contact with and (suspected) exposure to potentially hazardous body fluids 09/17/2020    Personal history of exposure to potentially hazardous body fluids    Decreased white blood cell count, unspecified 09/17/2020    Leukopenia    Impacted cerumen, bilateral 09/17/2020    Impacted cerumen of both ears       Surgical History  Past Surgical History:   Procedure Laterality Date    OTHER SURGICAL HISTORY  08/27/2020    Hernia repair    OTHER SURGICAL HISTORY  08/27/2020    Tonsillectomy        Social History  He reports that he has never smoked. He has never used smokeless tobacco. He reports current alcohol use of about 4.0 standard drinks of alcohol per week. He reports that he does not use drugs.    Family History  Family History   Problem Relation Name Age of Onset    Dementia Mother      Other (Cardiac disorder) Father      Stroke Father      Colon cancer Other cousin         Allergies  Lisinopril    Review of Systems  Constitutional: no weight loss, no fevers, no malaise  HEENT: negative  Neck: negative  Pulmonary: no SOB, no cough  CV: no chest pain, otherwise negative  GI: no pain, no diarrhea, no bloody stools, no constipation  : no hematuria, retention.  MS: no aches/pains  Neurologic: negative  Skin: no rashes, lesions  HEME: no bleeding  "tendency, no bruising  Psych: no mood issues    Physical Exam  General: well appearing, no acute distress, well nourished  HEENT: normal  Neck: supple  Pulmonary: lungs clear to auscultation bilaterally  CV: RR  Abdomen: soft, reducible mildly tender moderate-sized umbilical hernia.  : grossly normal external genitalia  MS: grossly normal  Neurologic: alert and oriented, strength/sensation intact  Skin: non jaundiced, no lesions  Psych: mood appropriate    Last Recorded Vitals  Blood pressure 124/83, pulse 88, temperature 36.4 °C (97.5 °F), height 1.88 m (6' 2\"), weight 110 kg (242 lb 9.6 oz).    Relevant Results        Transthoracic Echo (TTE) Complete With Strain And Contrast    Height: 1.905 m (6' 3\")   Weight: 112 kg (248 lb)   Blood Pressure: Not recorded    Date of Study: 4/18/24   Ordering Provider: Kaiden Lazo MD   Clinical Indications: heart murmur       Reading Physicians  Performing Staff   Cardiology: Kaiden Lazo MD    Tech: Andrea Bliss         Indications  Priority: Routine  heart murmur   Dx: Heart murmur, systolic [R01.1 (ICD-10-CM)]     PACS Images     Show images for Transthoracic echo (TTE) complete  Interpretation Summary       [facility], [address] [place] [address]              Tel [phone] and Fax [phone]     TRANSTHORACIC ECHOCARDIOGRAM REPORT        Patient Name:      BRITTNEE CAMERON MASTERS     Reading Physician:    [id number] Lazo MD  Study Date:        4/18/2024            Ordering Provider:    [id number] LAZO  MRN/PID:           [id number]             Fellow:  Accession#:        [id number]         Nurse:  Date of Birth/Age: 1970 / 54 years Sonographer:          Andrea Bliss                                                                [facility]  Gender:            M                    Additional Staff:  Height:   "          193.04 cm            Admit Date:  Weight:            108.41 kg            Admission Status:     Outpatient  BSA / BMI:         2.39 m2 / 29.09      Encounter#:           [id number]                     kg/m2                                          Department Location:  [facility]  Blood Pressure: 91 /62 mmHg     Study Type:    TRANSTHORACIC ECHO (TTE) COMPLETE  Diagnosis/ICD: Cardiac murmur, unspecified-R01.1  Indication:    Murmur  CPT Code:      Echo Complete w Full Doppler-[id number]     Patient History:  Pertinent History: Abnormal EKG, murmur, HTN, HLD.     Study Detail: The following Echo studies were performed: 2D, M-Mode, Doppler and                color flow. Definity used as a contrast agent for endocardial                border definition. Total contrast used for this procedure was 3.5                mL via IV push.        PHYSICIAN INTERPRETATION:  Left Ventricle: The left ventricular systolic function is normal, with an estimated ejection fraction of 70%. There are no regional wall motion abnormalities. The left ventricular cavity size is normal. There is mild asymmetric left ventricular hypertrophy involving the septal wall. Left Ventricular Global Longitudinal Strain - -19.3 %. Spectral Doppler shows an impaired relaxation pattern of left ventricular diastolic filling. There is a severe left ventricular outflow tract obstruction. The resting gradient is 72 mmHg.  Left Atrium: The left atrium is mildly dilated.  Right Ventricle: The right ventricle is normal in size. There is normal right ventricular global systolic function.  Right Atrium: The right atrium is normal in size.  Aortic Valve: The aortic valve is trileaflet. There is no evidence of aortic valve regurgitation.  Mitral Valve: The mitral valve is mild to moderately thickened. There is moderate mitral annular calcification. There is systolic anterior motion with obstructive septal contact. There is moderate mitral valve  regurgitation which is posteriorly directed.  Tricuspid Valve: The tricuspid valve is structurally normal. No evidence of tricuspid regurgitation. The right ventricular systolic pressure is unable to be estimated.  Pulmonic Valve: The pulmonic valve is structurally normal. There is no indication of pulmonic valve regurgitation.  Pericardium: There is no pericardial effusion noted.  Aorta: The aortic root is normal.        CONCLUSIONS:   1. Left ventricular systolic function is normal with a 70% estimated ejection fraction.   2. Spectral Doppler shows an impaired relaxation pattern of left ventricular diastolic filling.   3. There is mild asymmetric left ventricular hypertrophy.   4. There is moderate mitral annular calcification.   5. Moderate mitral valve regurgitation.   6. There is a severe left ventricular outflow tract obstruction.     QUANTITATIVE DATA SUMMARY:  2D MEASUREMENTS:     Assessment/Plan       Impression:  Umbilical Hernia, increasingly symptomatic    -I carefully reviewed pathophysiology of umbilical hernias with patient  -Risks of eventual incarceration/strangulation, worsening symptomatology described  -Rationale for surgical repair outlined  -Techniques of repair described (laparoscopy vs open)  -Risks of surgery addressed (bleeding, infection, bladder/bowel injury, chronic pain syndromes, recurrence, etc)  -Expected recovery timeline conveyed (2-4 weeks of limited activity, work restrictions, need for pain medications, etc)  -Other option would be watchful waiting, avoidance of activity that worsens symptoms  -Patient has indicated to me a verbal understanding of all this information and would like to proceed with open versus laparoscopic repair of the hernia with or without mesh depending on size.  -Await cardiac MRI to make sure it is safe to proceed with surgery  -Office will schedule at patient convenience       I spent 40 minutes in the professional and overall care of this  patient.      Ze Patrick MD

## 2024-05-30 ENCOUNTER — HOSPITAL ENCOUNTER (OUTPATIENT)
Dept: RADIOLOGY | Facility: CLINIC | Age: 54
Discharge: HOME | End: 2024-05-30
Payer: COMMERCIAL

## 2024-05-30 DIAGNOSIS — Q24.8 LEFT VENTRICULAR OUTFLOW TRACT OBSTRUCTION (HHS-HCC): ICD-10-CM

## 2024-05-30 PROCEDURE — 2550000001 HC RX 255 CONTRASTS: Performed by: INTERNAL MEDICINE

## 2024-05-30 PROCEDURE — A9575 INJ GADOTERATE MEGLUMI 0.1ML: HCPCS | Performed by: INTERNAL MEDICINE

## 2024-05-30 PROCEDURE — 75561 CARDIAC MRI FOR MORPH W/DYE: CPT

## 2024-05-30 PROCEDURE — 75565 CARD MRI VELOC FLOW MAPPING: CPT | Performed by: INTERNAL MEDICINE

## 2024-05-30 PROCEDURE — 75561 CARDIAC MRI FOR MORPH W/DYE: CPT | Performed by: INTERNAL MEDICINE

## 2024-05-30 PROCEDURE — 75565 CARD MRI VELOC FLOW MAPPING: CPT

## 2024-05-30 RX ORDER — GADOTERATE MEGLUMINE 376.9 MG/ML
40 INJECTION INTRAVENOUS
Status: COMPLETED | OUTPATIENT
Start: 2024-05-30 | End: 2024-05-30

## 2024-05-30 RX ADMIN — GADOTERATE MEGLUMINE 40 ML: 376.9 INJECTION INTRAVENOUS at 11:22

## 2024-06-05 ENCOUNTER — SPECIALTY PHARMACY (OUTPATIENT)
Dept: PHARMACY | Facility: CLINIC | Age: 54
End: 2024-06-05

## 2024-06-05 PROCEDURE — RXMED WILLOW AMBULATORY MEDICATION CHARGE

## 2024-06-06 ENCOUNTER — PHARMACY VISIT (OUTPATIENT)
Dept: PHARMACY | Facility: CLINIC | Age: 54
End: 2024-06-06
Payer: COMMERCIAL

## 2024-06-28 ENCOUNTER — CLINICAL SUPPORT (OUTPATIENT)
Dept: PREADMISSION TESTING | Facility: HOSPITAL | Age: 54
End: 2024-06-28
Payer: COMMERCIAL

## 2024-06-28 DIAGNOSIS — K42.9 UMBILICAL HERNIA WITHOUT OBSTRUCTION AND WITHOUT GANGRENE: ICD-10-CM

## 2024-06-28 RX ORDER — CETIRIZINE HYDROCHLORIDE 10 MG/1
10 TABLET ORAL DAILY
COMMUNITY

## 2024-07-01 DIAGNOSIS — I42.1 HOCM (HYPERTROPHIC OBSTRUCTIVE CARDIOMYOPATHY) (MULTI): Primary | ICD-10-CM

## 2024-07-01 RX ORDER — METOPROLOL TARTRATE 25 MG/1
25 TABLET, FILM COATED ORAL 2 TIMES DAILY
Qty: 60 TABLET | Refills: 11 | Status: SHIPPED | OUTPATIENT
Start: 2024-07-01 | End: 2025-07-01

## 2024-07-01 NOTE — PROGRESS NOTES
I spoke with patient over the phone today.  He is doing quite well.  His only symptom is minimal shortness of breath when he walks up a flight or 2 of stairs.  He has never had syncope.  He denies any chest discomfort.  I reviewed his cardiac MRI in detail with him.  In preparation for his hernia surgery I am going to start him on metoprolol to tartrate 25 mg twice daily.  The key to getting him through surgery safely is to beta-blocker him to prevent tachycardia and to maintain hydration perioperatively.  He should be aggressively hydrated prior to the surgery.    His interventricular septum does not meet criteria for implantable defibrillator.  He has no family history of sudden cardiac death.    The more concerning issue is his degree of mitral regurgitation and MIGUEL.  Hopefully beta-blocker therapy will improve this to some degree.    Will refer him to the [facility] clinic.  This is an all-encompassing clinic for both genetics, sudden cardiac death prevention, and valvular issues.  Here he will get a full comprehensive second opinion regarding future therapies.    SDH

## 2024-07-02 DIAGNOSIS — I42.2 CARDIOMYOPATHY, HYPERTROPHIC (MULTI): Primary | ICD-10-CM

## 2024-07-05 ENCOUNTER — PRE-ADMISSION TESTING (OUTPATIENT)
Dept: PREADMISSION TESTING | Facility: HOSPITAL | Age: 54
End: 2024-07-05
Payer: COMMERCIAL

## 2024-07-05 ENCOUNTER — HOSPITAL ENCOUNTER (OUTPATIENT)
Dept: CARDIOLOGY | Facility: HOSPITAL | Age: 54
Discharge: HOME | End: 2024-07-05
Payer: COMMERCIAL

## 2024-07-05 ENCOUNTER — LAB (OUTPATIENT)
Dept: LAB | Facility: LAB | Age: 54
End: 2024-07-05
Payer: COMMERCIAL

## 2024-07-05 VITALS
WEIGHT: 238.1 LBS | SYSTOLIC BLOOD PRESSURE: 131 MMHG | DIASTOLIC BLOOD PRESSURE: 81 MMHG | TEMPERATURE: 98.6 F | BODY MASS INDEX: 30.56 KG/M2 | RESPIRATION RATE: 16 BRPM | HEIGHT: 74 IN | OXYGEN SATURATION: 95 % | HEART RATE: 65 BPM

## 2024-07-05 DIAGNOSIS — Z01.818 PRE-OP TESTING: Primary | ICD-10-CM

## 2024-07-05 DIAGNOSIS — I42.2 CARDIOMYOPATHY, HYPERTROPHIC (MULTI): ICD-10-CM

## 2024-07-05 DIAGNOSIS — Z01.818 PRE-OP TESTING: ICD-10-CM

## 2024-07-05 LAB
ALBUMIN SERPL BCP-MCNC: 4.4 G/DL (ref 3.4–5)
ALP SERPL-CCNC: 68 U/L (ref 33–120)
ALT SERPL W P-5'-P-CCNC: 23 U/L (ref 10–52)
ANION GAP SERPL CALC-SCNC: 13 MMOL/L (ref 10–20)
AST SERPL W P-5'-P-CCNC: 31 U/L (ref 9–39)
BASOPHILS # BLD AUTO: 0.07 X10*3/UL (ref 0–0.1)
BASOPHILS NFR BLD AUTO: 1.2 %
BILIRUB DIRECT SERPL-MCNC: 0.1 MG/DL (ref 0–0.3)
BILIRUB SERPL-MCNC: 0.9 MG/DL (ref 0–1.2)
BODY SURFACE AREA: 2.37 M2
BUN SERPL-MCNC: 11 MG/DL (ref 6–23)
CALCIUM SERPL-MCNC: 9.3 MG/DL (ref 8.6–10.3)
CHLORIDE SERPL-SCNC: 103 MMOL/L (ref 98–107)
CO2 SERPL-SCNC: 26 MMOL/L (ref 21–32)
CREAT SERPL-MCNC: 0.87 MG/DL (ref 0.5–1.3)
EGFRCR SERPLBLD CKD-EPI 2021: >90 ML/MIN/1.73M*2
EOSINOPHIL # BLD AUTO: 0.15 X10*3/UL (ref 0–0.7)
EOSINOPHIL NFR BLD AUTO: 2.5 %
ERYTHROCYTE [DISTWIDTH] IN BLOOD BY AUTOMATED COUNT: 12.4 % (ref 11.5–14.5)
GLUCOSE SERPL-MCNC: 93 MG/DL (ref 74–99)
HCT VFR BLD AUTO: 44.3 % (ref 41–52)
HGB BLD-MCNC: 15.5 G/DL (ref 13.5–17.5)
IMM GRANULOCYTES # BLD AUTO: 0.02 X10*3/UL (ref 0–0.7)
IMM GRANULOCYTES NFR BLD AUTO: 0.3 % (ref 0–0.9)
LYMPHOCYTES # BLD AUTO: 1.92 X10*3/UL (ref 1.2–4.8)
LYMPHOCYTES NFR BLD AUTO: 32.2 %
MCH RBC QN AUTO: 30.2 PG (ref 26–34)
MCHC RBC AUTO-ENTMCNC: 35 G/DL (ref 32–36)
MCV RBC AUTO: 86 FL (ref 80–100)
MONOCYTES # BLD AUTO: 0.56 X10*3/UL (ref 0.1–1)
MONOCYTES NFR BLD AUTO: 9.4 %
NEUTROPHILS # BLD AUTO: 3.24 X10*3/UL (ref 1.2–7.7)
NEUTROPHILS NFR BLD AUTO: 54.4 %
NRBC BLD-RTO: 0 /100 WBCS (ref 0–0)
PLATELET # BLD AUTO: 217 X10*3/UL (ref 150–450)
POTASSIUM SERPL-SCNC: 4.3 MMOL/L (ref 3.5–5.3)
PROT SERPL-MCNC: 6.8 G/DL (ref 6.4–8.2)
RBC # BLD AUTO: 5.13 X10*6/UL (ref 4.5–5.9)
SODIUM SERPL-SCNC: 138 MMOL/L (ref 136–145)
WBC # BLD AUTO: 6 X10*3/UL (ref 4.4–11.3)

## 2024-07-05 PROCEDURE — 87081 CULTURE SCREEN ONLY: CPT | Mod: AHULAB | Performed by: NURSE PRACTITIONER

## 2024-07-05 PROCEDURE — 93246 EXT ECG>7D<15D RECORDING: CPT

## 2024-07-05 PROCEDURE — 99204 OFFICE O/P NEW MOD 45 MIN: CPT | Performed by: NURSE PRACTITIONER

## 2024-07-05 PROCEDURE — 36415 COLL VENOUS BLD VENIPUNCTURE: CPT

## 2024-07-05 PROCEDURE — 80053 COMPREHEN METABOLIC PANEL: CPT

## 2024-07-05 PROCEDURE — 82248 BILIRUBIN DIRECT: CPT

## 2024-07-05 PROCEDURE — 85025 COMPLETE CBC W/AUTO DIFF WBC: CPT

## 2024-07-05 RX ORDER — CHLORHEXIDINE GLUCONATE ORAL RINSE 1.2 MG/ML
SOLUTION DENTAL
Qty: 475 ML | Refills: 0 | Status: SHIPPED | OUTPATIENT
Start: 2024-07-05

## 2024-07-05 ASSESSMENT — ENCOUNTER SYMPTOMS
MUSCULOSKELETAL NEGATIVE: 1
CONSTIPATION: 0
CONSTITUTIONAL NEGATIVE: 1
DYSPNEA AT REST: 0
ARTHRALGIAS: 0
MYALGIAS: 0
BRUISES/BLEEDS EASILY: 0
RESPIRATORY NEGATIVE: 1
NECK NEGATIVE: 1
PALPITATIONS: 0
DIARRHEA: 0
ABDOMINAL PAIN: 0
NEUROLOGICAL NEGATIVE: 1
DYSPNEA WITH EXERTION: 0

## 2024-07-05 NOTE — PREPROCEDURE INSTRUCTIONS
Medication List            Accurate as of July 5, 2024 12:37 PM. Always use your most recent med list.                amLODIPine 10 mg tablet  Commonly known as: Norvasc  TAKE 1 TABLET BY MOUTH EVERY DAY  Medication Adjustments for Surgery: Take morning of surgery with sip of water, no other fluids     ascorbic acid 500 mg chewable tablet  Commonly known as: Vitamin C  Medication Adjustments for Surgery: Stop 7 days before surgery     atorvastatin 20 mg tablet  Commonly known as: Lipitor  TAKE 1 TABLET BY MOUTH EVERY DAY  Medication Adjustments for Surgery: Take morning of surgery with sip of water, no other fluids     candesartan 16 mg tablet  Commonly known as: Atacand  TAKE 1 TABLET BY MOUTH EVERY DAY  Notes to patient: Hold any evening dose the night before the day of surgery. Hold the day of surgery.      chlorhexidine 0.12 % solution  Commonly known as: Peridex  Swish for 30 seconds and spit 15mL of solution the night before and morning of surgery     doxycycline 100 mg tablet  Commonly known as: Vibra-Tabs  Take 2 tablets by mouth within 24-72 hours after unprotected sexual encounter. Take with a full glass of water and do not lie down for at least 30 minutes after.  Notes to patient: Take as directed     emtricitabine-tenofovir (TDF) 200-300 mg tablet  Commonly known as: Truvada  Take 1 tablet by mouth once daily.  Medication Adjustments for Surgery: Take morning of surgery with sip of water, no other fluids     escitalopram 20 mg tablet  Commonly known as: Lexapro  Take 1 tablet (20 mg) by mouth once daily.  Medication Adjustments for Surgery: Take morning of surgery with sip of water, no other fluids     esomeprazole 40 mg DR capsule  Commonly known as: NexIUM  Medication Adjustments for Surgery: Take morning of surgery with sip of water, no other fluids     metoprolol tartrate 25 mg tablet  Commonly known as: Lopressor  Take 1 tablet (25 mg) by mouth 2 times a day.  Medication Adjustments for Surgery:  Take morning of surgery with sip of water, no other fluids     multivitamin tablet  Medication Adjustments for Surgery: Stop 7 days before surgery     tadalafil 10 mg tablet  Commonly known as: Cialis  Medication Adjustments for Surgery: Stop 3 days before surgery     ZyrTEC 10 mg tablet  Generic drug: cetirizine  Medication Adjustments for Surgery: Stop 1 day before surgery                          **Concerning above medication instructions, if medication is normally taken at night, continue normal schedule.**  **DO NOT TAKE NIGHT PRIOR AND MORNING OF SURGERY**    CONTACT SURGEON'S OFFICE IF YOU DEVELOP:  * Fever = 100.4 F   * New respiratory symptoms (e.g. cough, shortness of breath, respiratory distress, sore throat)  * Recent loss of taste or smell  *Flu like symptoms such as headache, fatigue or gastrointestinal symptoms  * You develop any open sores, shingles, burning or painful urination   AND/OR:  * You no longer wish to have the surgery.  * Any other personal circumstances change that may lead to the need to cancel or defer this surgery.  *You were admitted to any hospital within one week of your planned procedure.    SMOKING:  *Quitting smoking can make a huge difference to your health and recovery from surgery.    *If you need help with quitting, call 9-725-QUIT-NOW.    THE DAY BEFORE SURGERY:  *Do not eat any food after midnight the night before surgery.   *You are permitted to drink clear liquids (i.e. water, black coffee (no milk or cream), tea, apple juice and electrolyte drinks (gatorade)) up to 13.5 ounces, up to 2 hours before your arrival time.  *You may chew gum until 2 hours before your surgery    SURGICAL TIME  *You will be contacted between 2 p.m. and 6 p.m. the business day before your surgery with your arrival time.  *If you haven't received a call by 6pm, call [phone].  *Scheduled surgery times may change and you will be notified if this occurs-check your personal voicemail for any  updates.    ON THE MORNING OF SURGERY:  *Wear comfortable, loose fitting clothing.   *Do not use moisturizers, creams, lotions or perfume.  *All jewelry and valuables should be left at home.  *Prosthetic devices such as contact lenses, hearing aids, dentures, eyelash extensions, hairpins and body piercing must be removed before surgery.    BRING WITH YOU:  *Photo ID and insurance card  *Current list of medicines and allergies  *Pacemaker/Defibrillator/Heart stent cards  *CPAP machine and mask  *Slings/splints/crutches  *Copy of your complete Advanced Directive/DHPOA-if applicable  *Neurostimulator implant remote    PARKING AND ARRIVAL:  *Check in at the Main Entrance desk and let them know you are here for surgery.  *You will be directed to the 2nd floor surgical waiting area.    AFTER OUTPATIENT SURGERY:  *A responsible adult MUST accompany you at the time of discharge and stay with you for 24 hours after your surgery.  *You may NOT drive yourself home after surgery.  *You may use a taxi or ride sharing service ([employer], Uber) to return home ONLY if you are accompanied by a friend or family member.  *Instructions for resuming your medications will be provided by your surgeon.         Patient Information: Oral/Dental Rinse  **This is a prescription; pick it up at your preferred local pharmacy **  What is oral/dental rinse?   It is a mouthwash. It is a way of cleaning the mouth with a germ killing solution before your surgery.  The solution contains chlorhexidine, commonly known as CHG.   It is used inside the mouth to kill a bacteria known as Staphylococcus aureus.  Let your doctor know if you are allergic to Chlorhexidine.    Why do I need to use CHG oral/dental rinse?  The CHG oral/dental rinse helps to kill a bacteria in your mouth known a Staphylococcus aureus.     This reduces the risk of infection at the surgical site.      Using your CHG oral/dental rinse  STEPS:  Use your CHG oral/dental rinse after you brush  your teeth the night before (at bedtime) and the morning of your surgery.  Follow all directions on your prescription label.    Use the cap on the container to measure 15ml (fill cap to fill line)  Swish (gargle if you can) the mouthwash in your mouth for at least 30 seconds, (do not to swallow) spit out  After you use your CHG rinse, do not rinse your mouth with water, drink or eat.  Please refer to prescription label for the appropriate time to resume oral intake  Dental rinse comes in one size bottle: 473ml ~16oz.  You will have leftover    rinse, discard after this use.    What side effects might I have using the CHG oral/dental rinse?  CHG rinse will stick to plaque on the teeth.  Brush and floss just before use.  Teeth brushing will help avoid staining of plaque during use.    Who should I contact if I have questions about the CHG oral/dental rinse?  Please call [facility], Preadmission Testing at [phone] if you have any questions       Home Preoperative Antibacterial Shower     What is a home preoperative antibacterial shower?  This shower is a way of cleaning the skin with a germ killing soap before surgery.  The soap contains chlorhexidine, commonly known as CHG.  CHG is a soap for your skin with germ killing ability.  Let your doctor know if you are allergic to chlorhexidine.    Why do I need to take a preoperative antibacterial shower?  Skin is not sterile.  It is best to try to make your skin as free of germs as possible before surgery.  Proper cleansing with a germ killing soap before surgery can lower the number of germs on your skin.  This helps to reduce the risk of infection at the surgical site.  Following the instructions listed below will help you prepare your skin for surgery.      How do I use the CHG skin cleanser?  Steps:  Begin using your CHG soap five days before your scheduled surgery on ________________________.    Days 1-4 Shower before bed:  Wash your  face and genitals with your normal soap and rinse.    Wash and rinse your hair using the CHG soap. Rinse completely, do not condition your   Hair.          3.    Apply the CHG soap to a clean wet washcloth.  Turn the water off or move away                From the water spray to avoid premature rinsing of the CHG soap as you are applying.     4.   Lather your entire body from the neck down.  Do not use on your face or genitals.   Pay special attention to the area(s) where your incision(s) will be located unless they are on your face.  Avoid scrubbing your skin too hard.  The important point is to have the CHG soap sit on your skin for 3 minutes.    When the 3 minutes are up, turn on the water and rinse the CHG soap off your body completely.   Pat yourself dry with a clean, freshly-laundered towel.  Dress in clean, freshly laundered night clothes.    Be sure to sleep with clean, freshly laundered sheets.  Day 5:  Last shower is the morning before surgery: Follow above Instructions.    NOTE:    *Hair extensions should be removed    *Keep CHG soap out of eyes and ear canals   *DO NOT wash with regular soap on your body after you have used the CHG        soap solution  *DO NOT apply powders, lotions, or perfume.  *Deodorant may be used days 1-4, BUT NOT the day of surgery    Who should I contact if I have any questions regarding the use of CHG soap?  Call the [facility], Preadmission Testing at [phone] if you have any questions.              Patient Information: Pre-Operative Infection Prevention Measures     Why did I have my nose, under my arms and groin swabbed?  The purpose of the swab is to identify Staphylococcus aureus inside your nose or on your skin.  The swab was sent to the laboratory for culture.  A positive swab/culture for Staphylococcus aureus is called colonization or carriage.      What is Staphylococcus aureus?  Staphylococcus aureus, also known as “staph”, is a germ  found on the skin or in the nose of healthy people.  Sometimes Staphylococcus aureus can get into the body and cause an infection.  This can be minor (such as pimples, boils or other skin problems).  It might also be serious (such as blood infection, pneumonia or a surgical site infection).    What is Staphylococcus aureus colonization or carriage?  Colonization or carriage means that a person has the germ but is not sick from it.  These bacteria can be spread on the hands or when breathing or sneezing.    How is Staphylococcus aureus spread?  It is most often spread by close contact with a person or item that carries it.    What happens if my culture is positive for Staphylococcus aureus?  Your doctor/medical team will use this information to guide any antibiotic treatment which may be necessary.  Regardless of the culture results, we will clean the inside of your nose with a betadine swab just before you have your surgery.      Will I get an infection if I have Staphylococcus aureus in my nose or on my skin?  Anyone can get an infection with Staphylococcus aureus.  However, the best way to reduce your risk of infection is to follow the instructions provided to you for the use of your CHG soap and dental rinse.        Who should I contact if I have any questions?  Call the [facility], Preadmission Testing at [phone] if you have any questions.

## 2024-07-05 NOTE — CPM/PAT H&P
"[facility]/PAT Evaluation       Name: Blake Dominique (Blake Dominique \"SAIMA\")  /Age: 1970/54 y.o.         Date of Consult: 24    Referring Provider: Dr. Patrick    Surgery, Date, and Length: Robot Assisted Umbilical Hernia Repair; Mesh Placement, 24, 150 min    Blake Dominique is a 54 year-old male who presents to the [facility] for perioperative risk assessment prior to surgery.  SAIMA Dominique is a 54 y.o. male presenting with increasingly uncomfortable periumbilical bulge.  He says he is able to reduce it at the end of the day.  Tends to pop out when he is exercising or doing any core muscle work.  When this occurs it does cause him some discomfort.  He is a cardiac intensive care nurse.  Works at [employer] [facility] [place].  He is on some medicine for hypertension and hyperlipidemia.  Also on Truvada and some antidepressants.  He also is scheduled for a cardiac MRI tomorrow to rule out hypertrophic cardiomyopathy.  He is seeing Dr. Miles from cardiology on the [place].     Patient presents with a primary diagnosis of umbilical hernia without obstruction and without gangrene.    This note was created in part upon personal review of patient's medical records.      Patient is scheduled to have a Robot Assisted Umbilical Hernia Repair; Mesh Placement.    Pt denies any past history of anesthetic complications such as PONV, awareness, prolonged sedation, dental damage, aspiration, cardiac arrest, difficult intubation, difficult I.V. access or unexpected hospital admissions.  NO malignant hyperthermia and or pseudocholinesterase deficiency.  No history of blood transfusions     The patient is not a Moravian and will accept blood and blood products if medically indicated.       Past Medical History:   Diagnosis Date    Anxiety     Depression     Gilbert's syndrome     2.3.24: bili, total: 1.7    Hyperlipidemia     Hypertension     Hypertrophic obstructive cardiomyopathy (HOCM) (Multi)     cMRI :1. Hypertrophic " obstructive cardiomyopathy. Basal anterior septum 1.6 cm. No evidence of left ventricular apical aneurysm. 2. Normal left ventricular cavity size with hyperdynamic systolic function. Ejection fraction 72%. 3. Asymmetric septal left ventricular hypertrophy. Basal anterior septum 1.6 cm. LV mass index 101 g/m2 (normal 57-91).    Left ventricular outflow tract obstruction (HHS-HCC)     severe - prompted cMRI which shwed hypertrophic obstructive cardiomyopathy    Pre-operative clearance     cardiac clearance requested    Umbilical hernia     Vitamin D deficiency        Past Surgical History:   Procedure Laterality Date    COLONOSCOPY      HERNIA REPAIR Left     inguinal hernia as a child    TONSILLECTOMY      UPPER GASTROINTESTINAL ENDOSCOPY       Social History     Socioeconomic History    Marital status: Single     Spouse name: Not on file    Number of children: Not on file    Years of education: Not on file    Highest education level: Not on file   Occupational History    Not on file   Tobacco Use    Smoking status: Former     Types: Cigarettes    Smokeless tobacco: Never    Tobacco comments:     Quit 2019 - 1/2 PPD x 20 yrs    Vaping Use    Vaping status: Never Used   Substance and Sexual Activity    Alcohol use: Yes     Alcohol/week: 5.0 standard drinks of alcohol     Types: 5 Standard drinks or equivalent per week    Drug use: Never    Sexual activity: Not on file   Other Topics Concern    Not on file   Social History Narrative    Not on file     Social Determinants of Health     Financial Resource Strain: Not on file   Food Insecurity: Not on file   Transportation Needs: Not on file   Physical Activity: Not on file   Stress: Not on file   Social Connections: Not on file   Intimate Partner Violence: Not on file   Housing Stability: Not on file        Family History   Problem Relation Name Age of Onset    Dementia Mother      Other (Cardiac disorder) Father      Stroke Father      Colon cancer Other cousin         Allergies   Allergen Reactions    Lisinopril Cough         Current Outpatient Medications:     amLODIPine (Norvasc) 10 mg tablet, TAKE 1 TABLET BY MOUTH EVERY DAY, Disp: 90 tablet, Rfl: 3    ascorbic acid (Vitamin C) 500 mg chewable tablet, GNP Vitamin C 500 MG Oral Tablet  Refills: 0      Start : 2-Nov-2020  Active, Disp: , Rfl:     atorvastatin (Lipitor) 20 mg tablet, TAKE 1 TABLET BY MOUTH EVERY DAY, Disp: 90 tablet, Rfl: 3    candesartan (Atacand) 16 mg tablet, TAKE 1 TABLET BY MOUTH EVERY DAY, Disp: 90 tablet, Rfl: 3    cetirizine (ZyrTEC) 10 mg tablet, Take 1 tablet (10 mg) by mouth once daily., Disp: , Rfl:     doxycycline (Vibra-Tabs) 100 mg tablet, Take 2 tablets by mouth within 24-72 hours after unprotected sexual encounter. Take with a full glass of water and do not lie down for at least 30 minutes after., Disp: 30 tablet, Rfl: 0    emtricitabine-tenofovir, TDF, (Truvada) 200-300 mg tablet, Take 1 tablet by mouth once daily., Disp: 30 tablet, Rfl: 2    escitalopram (Lexapro) 20 mg tablet, Take 1 tablet (20 mg) by mouth once daily., Disp: 90 tablet, Rfl: 3    esomeprazole (NexIUM) 40 mg DR capsule, Take 1 capsule (40 mg) by mouth once daily in the morning. Take before meals. Do not open capsule., Disp: , Rfl:     metoprolol tartrate (Lopressor) 25 mg tablet, Take 1 tablet (25 mg) by mouth 2 times a day., Disp: 60 tablet, Rfl: 11    multivitamin tablet, Take 1 tablet by mouth once daily., Disp: , Rfl:     chlorhexidine (Peridex) 0.12 % solution, Swish for 30 seconds and spit 15mL of solution the night before and morning of surgery, Disp: 475 mL, Rfl: 0    tadalafil (Cialis) 10 mg tablet, Take 1 tablet (10 mg) by mouth once daily as needed., Disp: , Rfl:       PAT ROS:   Constitutional:   neg    Neuro/Psych:   neg    Eyes:    use of corrective lenses  Ears:    no hearing aides  Nose:   Mouth:   neg    Throat:   neg    Neck:   neg    Cardio:    Functional 4 Mets. Patient denies SOB walking up 2 flights  "of stairs , gym 3x week. Weight lifting, elliptical   no chest pain   no palpitations   no dyspnea   no GIPSON  Respiratory:   neg    Endocrine:   GI:    no abdominal pain   no constipation   no diarrhea  :   neg    Musculoskeletal:   neg     no arthralgias   no myalgias  Hematologic:    does not bruise/bleed easily   no history of blood transfusion   no blood clots  Skin:  neg        Physical Exam  Physical exam within normal limits.   Abdominal:      General: Bowel sounds are normal. There is no distension.      Palpations: Abdomen is soft.      Tenderness: There is no abdominal tenderness. There is no guarding.      Hernia: A hernia is present.          PAT AIRWAY:   Airway:     Mallampati::  III    Neck ROM::  Full   No broken teeth, no dentures and no missing teeth         Visit Vitals  /81   Pulse 65   Temp 37 °C (98.6 °F)   Resp 16   Ht 1.87 m (6' 1.62\")   Wt 108 kg (238 lb 1.6 oz)   SpO2 95%   BMI 30.88 kg/m²   Smoking Status Former   BSA 2.37 m²      Plan    Cardiovascular:  Patient denies any chest pain, tightness, heaviness, pressure, radiating pain, palpitations, irregular heartbeats, lightheadedness, cough, congestion, shortness of breath, GIPSON, PND, near syncope, weight loss or gain.    HLD:  Lipitor-patient to take on dos    HTN:  Amlodipine-patient to take on dos  Candesartan-patient to hold any evening dose the night before the day of surgery. Hold the day of surgery.  Lopressor-patient to take on dos    HOCM-Hypertrophic obstructive cardiomyopathy  Left ventricular outflow tract obstruction    TRANSTHORACIC ECHOCARDIOGRAM REPORT 04/18/24  CONCLUSIONS:   1. Left ventricular systolic function is normal with a 70% estimated ejection fraction.   2. Spectral Doppler shows an impaired relaxation pattern of left ventricular diastolic filling.   3. There is mild asymmetric left ventricular hypertrophy.   4. There is moderate mitral annular calcification.   5. Moderate mitral valve regurgitation.   6. " There is a severe left ventricular outflow tract obstruction.    Cardiac Clearance received from Dr. Kaiden Miles on 07/01/24-scanned into chart    EKG    Encounter Date: 03/22/24   ECG 12 lead (Clinic Performed)   Result Value    Ventricular Rate 70    Atrial Rate 70    DC Interval 174    QRS Duration 104    QT Interval 406    QTC Calculation(Bazett) 438    P Axis -8    R Axis -22    T Axis 110    QRS Count 12    Q Onset 217    P Onset 130    P Offset 179    T Offset 420    QTC Fredericia 427    Narrative    Normal sinus rhythm  Left ventricular hypertrophy with repolarization abnormality  Inferior infarct , age undetermined  Abnormal ECG  No previous ECGs available  Confirmed by Kaiden Miles ([id number]) on 3/29/2024 8:34:07 AM     RCRI: 1 Risk of Mace: 6.0%    Pulm:  Denies any shortness of breath or activity intolerance.    Stop Bang= 3 (intermediate risk) htn, male, age    GI/:  History of Gilbert's Syndrome  GERD:  Nexium-patient to take on dos    Heme:  Patient instructed to ambulate as soon as possible postoperatively to decrease thromboembolic risk.    Initiate mechanical DVT prophylaxis as soon as possible and initiate chemical prophylaxis when deemed safe from a bleeding standpoint post surgery.    Caprini=5    Risk assessment complete.  Patient is scheduled for an intermediate surgical risk procedure.       Labs/testing obtained in PAT on 07/05/24  CBC, CMP, MRSA    Lab Results   Component Value Date    WBC 6.0 07/05/2024    HGB 15.5 07/05/2024    HCT 44.3 07/05/2024    MCV 86 07/05/2024     07/05/2024     Lab Results   Component Value Date    GLUCOSE 93 07/05/2024    CALCIUM 9.3 07/05/2024     07/05/2024    K 4.3 07/05/2024    CO2 26 07/05/2024     07/05/2024    BUN 11 07/05/2024    CREATININE 0.87 07/05/2024      Lab Results   Component Value Date    ALT 23 07/05/2024    AST 31 07/05/2024    ALKPHOS 68 07/05/2024    BILITOT 0.9 07/05/2024      Labs reviewed, unremarkable.     Follow up:  MRSA    Preoperative medication instructions were provided and reviewed with the patient.  Any additional testing or evaluation was explained to the patient.  Nothing by mouth instructions were discussed and patient's questions were answered prior to conclusion to this encounter.  Patient verbalized understanding of preoperative instructions given in preadmission testing; discharge instructions available in EMR.    This note was dictated with speech recognition.  Minor errors may have been detected during use of speech recognition.

## 2024-07-05 NOTE — H&P (VIEW-ONLY)
"[facility]/PAT Evaluation       Name: Blake Dominique (Blake Dominique \"SAIMA\")  /Age: 1970/54 y.o.         Date of Consult: 24    Referring Provider: Dr. Patrick    Surgery, Date, and Length: Robot Assisted Umbilical Hernia Repair; Mesh Placement, 24, 150 min    Blake Dominique is a 54 year-old male who presents to the [facility] for perioperative risk assessment prior to surgery.  SAIMA Dominique is a 54 y.o. male presenting with increasingly uncomfortable periumbilical bulge.  He says he is able to reduce it at the end of the day.  Tends to pop out when he is exercising or doing any core muscle work.  When this occurs it does cause him some discomfort.  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  He is on some medicine for hypertension and hyperlipidemia.  Also on Truvada and some antidepressants.  He also is scheduled for a cardiac MRI tomorrow to rule out hypertrophic cardiomyopathy.  He is seeing Dr. Miles from cardiology on the [place].     Patient presents with a primary diagnosis of umbilical hernia without obstruction and without gangrene.    This note was created in part upon personal review of patient's medical records.      Patient is scheduled to have a Robot Assisted Umbilical Hernia Repair; Mesh Placement.    Pt denies any past history of anesthetic complications such as PONV, awareness, prolonged sedation, dental damage, aspiration, cardiac arrest, difficult intubation, difficult I.V. access or unexpected hospital admissions.  NO malignant hyperthermia and or pseudocholinesterase deficiency.  No history of blood transfusions     The patient is not a Hindu and will accept blood and blood products if medically indicated.       Past Medical History:   Diagnosis Date    Anxiety     Depression     Gilbert's syndrome     2.3.24: bili, total: 1.7    Hyperlipidemia     Hypertension     Hypertrophic obstructive cardiomyopathy (HOCM) (Multi)     cMRI :1. Hypertrophic " obstructive cardiomyopathy. Basal anterior septum 1.6 cm. No evidence of left ventricular apical aneurysm. 2. Normal left ventricular cavity size with hyperdynamic systolic function. Ejection fraction 72%. 3. Asymmetric septal left ventricular hypertrophy. Basal anterior septum 1.6 cm. LV mass index 101 g/m2 (normal 57-91).    Left ventricular outflow tract obstruction (HHS-HCC)     severe - prompted cMRI which shwed hypertrophic obstructive cardiomyopathy    Pre-operative clearance     cardiac clearance requested    Umbilical hernia     Vitamin D deficiency        Past Surgical History:   Procedure Laterality Date    COLONOSCOPY      HERNIA REPAIR Left     inguinal hernia as a child    TONSILLECTOMY      UPPER GASTROINTESTINAL ENDOSCOPY       Social History     Socioeconomic History    Marital status: Single     Spouse name: Not on file    Number of children: Not on file    Years of education: Not on file    Highest education level: Not on file   Occupational History    Not on file   Tobacco Use    Smoking status: Former     Types: Cigarettes    Smokeless tobacco: Never    Tobacco comments:     Quit 2019 - 1/2 PPD x 20 yrs    Vaping Use    Vaping status: Never Used   Substance and Sexual Activity    Alcohol use: Yes     Alcohol/week: 5.0 standard drinks of alcohol     Types: 5 Standard drinks or equivalent per week    Drug use: Never    Sexual activity: Not on file   Other Topics Concern    Not on file   Social History Narrative    Not on file     Social Determinants of Health     Financial Resource Strain: Not on file   Food Insecurity: Not on file   Transportation Needs: Not on file   Physical Activity: Not on file   Stress: Not on file   Social Connections: Not on file   Intimate Partner Violence: Not on file   Housing Stability: Not on file        Family History   Problem Relation Name Age of Onset    Dementia Mother      Other (Cardiac disorder) Father      Stroke Father      Colon cancer Other cousin         Allergies   Allergen Reactions    Lisinopril Cough         Current Outpatient Medications:     amLODIPine (Norvasc) 10 mg tablet, TAKE 1 TABLET BY MOUTH EVERY DAY, Disp: 90 tablet, Rfl: 3    ascorbic acid (Vitamin C) 500 mg chewable tablet, GNP Vitamin C 500 MG Oral Tablet  Refills: 0      Start : 2-Nov-2020  Active, Disp: , Rfl:     atorvastatin (Lipitor) 20 mg tablet, TAKE 1 TABLET BY MOUTH EVERY DAY, Disp: 90 tablet, Rfl: 3    candesartan (Atacand) 16 mg tablet, TAKE 1 TABLET BY MOUTH EVERY DAY, Disp: 90 tablet, Rfl: 3    cetirizine (ZyrTEC) 10 mg tablet, Take 1 tablet (10 mg) by mouth once daily., Disp: , Rfl:     doxycycline (Vibra-Tabs) 100 mg tablet, Take 2 tablets by mouth within 24-72 hours after unprotected sexual encounter. Take with a full glass of water and do not lie down for at least 30 minutes after., Disp: 30 tablet, Rfl: 0    emtricitabine-tenofovir, TDF, (Truvada) 200-300 mg tablet, Take 1 tablet by mouth once daily., Disp: 30 tablet, Rfl: 2    escitalopram (Lexapro) 20 mg tablet, Take 1 tablet (20 mg) by mouth once daily., Disp: 90 tablet, Rfl: 3    esomeprazole (NexIUM) 40 mg DR capsule, Take 1 capsule (40 mg) by mouth once daily in the morning. Take before meals. Do not open capsule., Disp: , Rfl:     metoprolol tartrate (Lopressor) 25 mg tablet, Take 1 tablet (25 mg) by mouth 2 times a day., Disp: 60 tablet, Rfl: 11    multivitamin tablet, Take 1 tablet by mouth once daily., Disp: , Rfl:     chlorhexidine (Peridex) 0.12 % solution, Swish for 30 seconds and spit 15mL of solution the night before and morning of surgery, Disp: 475 mL, Rfl: 0    tadalafil (Cialis) 10 mg tablet, Take 1 tablet (10 mg) by mouth once daily as needed., Disp: , Rfl:       PAT ROS:   Constitutional:   neg    Neuro/Psych:   neg    Eyes:    use of corrective lenses  Ears:    no hearing aides  Nose:   Mouth:   neg    Throat:   neg    Neck:   neg    Cardio:    Functional 4 Mets. Patient denies SOB walking up 2 flights  "of stairs , gym 3x week. Weight lifting, elliptical   no chest pain   no palpitations   no dyspnea   no GIPSON  Respiratory:   neg    Endocrine:   GI:    no abdominal pain   no constipation   no diarrhea  :   neg    Musculoskeletal:   neg     no arthralgias   no myalgias  Hematologic:    does not bruise/bleed easily   no history of blood transfusion   no blood clots  Skin:  neg        Physical Exam  Physical exam within normal limits.   Abdominal:      General: Bowel sounds are normal. There is no distension.      Palpations: Abdomen is soft.      Tenderness: There is no abdominal tenderness. There is no guarding.      Hernia: A hernia is present.          PAT AIRWAY:   Airway:     Mallampati::  III    Neck ROM::  Full   No broken teeth, no dentures and no missing teeth         Visit Vitals  /81   Pulse 65   Temp 37 °C (98.6 °F)   Resp 16   Ht 1.87 m (6' 1.62\")   Wt 108 kg (238 lb 1.6 oz)   SpO2 95%   BMI 30.88 kg/m²   Smoking Status Former   BSA 2.37 m²      Plan    Cardiovascular:  Patient denies any chest pain, tightness, heaviness, pressure, radiating pain, palpitations, irregular heartbeats, lightheadedness, cough, congestion, shortness of breath, GIPSON, PND, near syncope, weight loss or gain.    HLD:  Lipitor-patient to take on dos    HTN:  Amlodipine-patient to take on dos  Candesartan-patient to hold any evening dose the night before the day of surgery. Hold the day of surgery.  Lopressor-patient to take on dos    HOCM-Hypertrophic obstructive cardiomyopathy  Left ventricular outflow tract obstruction    TRANSTHORACIC ECHOCARDIOGRAM REPORT 04/18/24  CONCLUSIONS:   1. Left ventricular systolic function is normal with a 70% estimated ejection fraction.   2. Spectral Doppler shows an impaired relaxation pattern of left ventricular diastolic filling.   3. There is mild asymmetric left ventricular hypertrophy.   4. There is moderate mitral annular calcification.   5. Moderate mitral valve regurgitation.   6. " There is a severe left ventricular outflow tract obstruction.    Cardiac Clearance received from Dr. Kaiden Miles on 07/01/24-scanned into chart    EKG    Encounter Date: 03/22/24   ECG 12 lead (Clinic Performed)   Result Value    Ventricular Rate 70    Atrial Rate 70    CA Interval 174    QRS Duration 104    QT Interval 406    QTC Calculation(Bazett) 438    P Axis -8    R Axis -22    T Axis 110    QRS Count 12    Q Onset 217    P Onset 130    P Offset 179    T Offset 420    QTC Fredericia 427    Narrative    Normal sinus rhythm  Left ventricular hypertrophy with repolarization abnormality  Inferior infarct , age undetermined  Abnormal ECG  No previous ECGs available  Confirmed by Kaiden Miles ([id number]) on 3/29/2024 8:34:07 AM     RCRI: 1 Risk of Mace: 6.0%    Pulm:  Denies any shortness of breath or activity intolerance.    Stop Bang= 3 (intermediate risk) htn, male, age    GI/:  History of Gilbert's Syndrome  GERD:  Nexium-patient to take on dos    Heme:  Patient instructed to ambulate as soon as possible postoperatively to decrease thromboembolic risk.    Initiate mechanical DVT prophylaxis as soon as possible and initiate chemical prophylaxis when deemed safe from a bleeding standpoint post surgery.    Caprini=5    Risk assessment complete.  Patient is scheduled for an intermediate surgical risk procedure.       Labs/testing obtained in PAT on 07/05/24  CBC, CMP, MRSA    Lab Results   Component Value Date    WBC 6.0 07/05/2024    HGB 15.5 07/05/2024    HCT 44.3 07/05/2024    MCV 86 07/05/2024     07/05/2024     Lab Results   Component Value Date    GLUCOSE 93 07/05/2024    CALCIUM 9.3 07/05/2024     07/05/2024    K 4.3 07/05/2024    CO2 26 07/05/2024     07/05/2024    BUN 11 07/05/2024    CREATININE 0.87 07/05/2024      Lab Results   Component Value Date    ALT 23 07/05/2024    AST 31 07/05/2024    ALKPHOS 68 07/05/2024    BILITOT 0.9 07/05/2024      Labs reviewed, unremarkable.     Follow up:  MRSA    Preoperative medication instructions were provided and reviewed with the patient.  Any additional testing or evaluation was explained to the patient.  Nothing by mouth instructions were discussed and patient's questions were answered prior to conclusion to this encounter.  Patient verbalized understanding of preoperative instructions given in preadmission testing; discharge instructions available in EMR.    This note was dictated with speech recognition.  Minor errors may have been detected during use of speech recognition.

## 2024-07-07 LAB — STAPHYLOCOCCUS SPEC CULT: NORMAL

## 2024-07-09 ENCOUNTER — SPECIALTY PHARMACY (OUTPATIENT)
Dept: PHARMACY | Facility: CLINIC | Age: 54
End: 2024-07-09

## 2024-07-09 PROCEDURE — RXMED WILLOW AMBULATORY MEDICATION CHARGE

## 2024-07-10 ENCOUNTER — PHARMACY VISIT (OUTPATIENT)
Dept: PHARMACY | Facility: CLINIC | Age: 54
End: 2024-07-10
Payer: COMMERCIAL

## 2024-07-11 ENCOUNTER — HOSPITAL ENCOUNTER (OUTPATIENT)
Facility: HOSPITAL | Age: 54
Setting detail: OUTPATIENT SURGERY
Discharge: HOME | End: 2024-07-11
Attending: SURGERY | Admitting: SURGERY
Payer: COMMERCIAL

## 2024-07-11 ENCOUNTER — ANESTHESIA EVENT (OUTPATIENT)
Dept: OPERATING ROOM | Facility: HOSPITAL | Age: 54
End: 2024-07-11
Payer: COMMERCIAL

## 2024-07-11 ENCOUNTER — ANESTHESIA (OUTPATIENT)
Dept: OPERATING ROOM | Facility: HOSPITAL | Age: 54
End: 2024-07-11
Payer: COMMERCIAL

## 2024-07-11 VITALS
BODY MASS INDEX: 28.59 KG/M2 | WEIGHT: 247.14 LBS | RESPIRATION RATE: 16 BRPM | TEMPERATURE: 97.5 F | HEIGHT: 78 IN | HEART RATE: 68 BPM | OXYGEN SATURATION: 94 % | SYSTOLIC BLOOD PRESSURE: 135 MMHG | DIASTOLIC BLOOD PRESSURE: 82 MMHG

## 2024-07-11 DIAGNOSIS — K42.9 UMBILICAL HERNIA WITHOUT OBSTRUCTION AND WITHOUT GANGRENE: Primary | ICD-10-CM

## 2024-07-11 PROBLEM — S09.93XA DENTAL INJURY: Status: ACTIVE | Noted: 2024-07-11

## 2024-07-11 LAB
ABO GROUP (TYPE) IN BLOOD: NORMAL
ANTIBODY SCREEN: NORMAL
RH FACTOR (ANTIGEN D): NORMAL

## 2024-07-11 PROCEDURE — 2780000003 HC OR 278 NO HCPCS: Performed by: SURGERY

## 2024-07-11 PROCEDURE — 3700000001 HC GENERAL ANESTHESIA TIME - INITIAL BASE CHARGE: Performed by: SURGERY

## 2024-07-11 PROCEDURE — 2500000005 HC RX 250 GENERAL PHARMACY W/O HCPCS

## 2024-07-11 PROCEDURE — 2500000004 HC RX 250 GENERAL PHARMACY W/ HCPCS (ALT 636 FOR OP/ED): Performed by: SURGERY

## 2024-07-11 PROCEDURE — 7100000010 HC PHASE TWO TIME - EACH INCREMENTAL 1 MINUTE: Performed by: SURGERY

## 2024-07-11 PROCEDURE — 2500000001 HC RX 250 WO HCPCS SELF ADMINISTERED DRUGS (ALT 637 FOR MEDICARE OP): Performed by: ANESTHESIOLOGY

## 2024-07-11 PROCEDURE — 2500000005 HC RX 250 GENERAL PHARMACY W/O HCPCS: Performed by: SURGERY

## 2024-07-11 PROCEDURE — 49593 RPR AA HRN 1ST 3-10 RDC: CPT | Performed by: SURGERY

## 2024-07-11 PROCEDURE — 7100000001 HC RECOVERY ROOM TIME - INITIAL BASE CHARGE: Performed by: SURGERY

## 2024-07-11 PROCEDURE — C1781 MESH (IMPLANTABLE): HCPCS | Performed by: SURGERY

## 2024-07-11 PROCEDURE — 36415 COLL VENOUS BLD VENIPUNCTURE: CPT | Performed by: SURGERY

## 2024-07-11 PROCEDURE — 7100000009 HC PHASE TWO TIME - INITIAL BASE CHARGE: Performed by: SURGERY

## 2024-07-11 PROCEDURE — 2500000004 HC RX 250 GENERAL PHARMACY W/ HCPCS (ALT 636 FOR OP/ED)

## 2024-07-11 PROCEDURE — 3600000009 HC OR TIME - EACH INCREMENTAL 1 MINUTE - PROCEDURE LEVEL FOUR: Performed by: SURGERY

## 2024-07-11 PROCEDURE — 3600000004 HC OR TIME - INITIAL BASE CHARGE - PROCEDURE LEVEL FOUR: Performed by: SURGERY

## 2024-07-11 PROCEDURE — 7100000002 HC RECOVERY ROOM TIME - EACH INCREMENTAL 1 MINUTE: Performed by: SURGERY

## 2024-07-11 PROCEDURE — 2720000007 HC OR 272 NO HCPCS: Performed by: SURGERY

## 2024-07-11 PROCEDURE — 3700000002 HC GENERAL ANESTHESIA TIME - EACH INCREMENTAL 1 MINUTE: Performed by: SURGERY

## 2024-07-11 PROCEDURE — 86901 BLOOD TYPING SEROLOGIC RH(D): CPT | Performed by: SURGERY

## 2024-07-11 DEVICE — LAPAROSCOPIC SELF-FIXATING MESH POLYESTER WITH POLYLACTIC ACID GRIPS AND COLLAGEN FILM
Type: IMPLANTABLE DEVICE | Site: UMBILICAL | Status: FUNCTIONAL
Brand: PROGRIP

## 2024-07-11 RX ORDER — OXYCODONE HYDROCHLORIDE 5 MG/1
5 TABLET ORAL EVERY 6 HOURS PRN
Qty: 12 TABLET | Refills: 0 | Status: SHIPPED | OUTPATIENT
Start: 2024-07-11

## 2024-07-11 RX ORDER — ROCURONIUM BROMIDE 10 MG/ML
INJECTION, SOLUTION INTRAVENOUS AS NEEDED
Status: DISCONTINUED | OUTPATIENT
Start: 2024-07-11 | End: 2024-07-11

## 2024-07-11 RX ORDER — FENTANYL CITRATE 50 UG/ML
INJECTION, SOLUTION INTRAMUSCULAR; INTRAVENOUS AS NEEDED
Status: DISCONTINUED | OUTPATIENT
Start: 2024-07-11 | End: 2024-07-11

## 2024-07-11 RX ORDER — PHENYLEPHRINE HCL IN 0.9% NACL 1 MG/10 ML
SYRINGE (ML) INTRAVENOUS AS NEEDED
Status: DISCONTINUED | OUTPATIENT
Start: 2024-07-11 | End: 2024-07-11

## 2024-07-11 RX ORDER — SODIUM CHLORIDE, SODIUM LACTATE, POTASSIUM CHLORIDE, CALCIUM CHLORIDE 600; 310; 30; 20 MG/100ML; MG/100ML; MG/100ML; MG/100ML
100 INJECTION, SOLUTION INTRAVENOUS CONTINUOUS
Status: DISCONTINUED | OUTPATIENT
Start: 2024-07-11 | End: 2024-07-11 | Stop reason: HOSPADM

## 2024-07-11 RX ORDER — OXYCODONE HYDROCHLORIDE 5 MG/1
5 TABLET ORAL EVERY 4 HOURS PRN
Status: DISCONTINUED | OUTPATIENT
Start: 2024-07-11 | End: 2024-07-11 | Stop reason: HOSPADM

## 2024-07-11 RX ORDER — LIDOCAINE HCL/PF 100 MG/5ML
SYRINGE (ML) INTRAVENOUS AS NEEDED
Status: DISCONTINUED | OUTPATIENT
Start: 2024-07-11 | End: 2024-07-11

## 2024-07-11 RX ORDER — LIDOCAINE HYDROCHLORIDE 10 MG/ML
0.1 INJECTION, SOLUTION EPIDURAL; INFILTRATION; INTRACAUDAL; PERINEURAL ONCE
Status: DISCONTINUED | OUTPATIENT
Start: 2024-07-11 | End: 2024-07-11 | Stop reason: HOSPADM

## 2024-07-11 RX ORDER — PROPOFOL 10 MG/ML
INJECTION, EMULSION INTRAVENOUS AS NEEDED
Status: DISCONTINUED | OUTPATIENT
Start: 2024-07-11 | End: 2024-07-11

## 2024-07-11 RX ORDER — ACETAMINOPHEN 325 MG/1
650 TABLET ORAL EVERY 4 HOURS PRN
Status: DISCONTINUED | OUTPATIENT
Start: 2024-07-11 | End: 2024-07-11 | Stop reason: HOSPADM

## 2024-07-11 RX ORDER — BUPIVACAINE HYDROCHLORIDE 5 MG/ML
INJECTION, SOLUTION PERINEURAL AS NEEDED
Status: DISCONTINUED | OUTPATIENT
Start: 2024-07-11 | End: 2024-07-11 | Stop reason: HOSPADM

## 2024-07-11 RX ORDER — SODIUM CHLORIDE 0.9 G/100ML
IRRIGANT IRRIGATION AS NEEDED
Status: DISCONTINUED | OUTPATIENT
Start: 2024-07-11 | End: 2024-07-11 | Stop reason: HOSPADM

## 2024-07-11 RX ORDER — IBUPROFEN 600 MG/1
600 TABLET ORAL EVERY 6 HOURS PRN
Qty: 20 TABLET | Refills: 0 | Status: SHIPPED | OUTPATIENT
Start: 2024-07-11

## 2024-07-11 RX ORDER — METHOCARBAMOL 100 MG/ML
INJECTION, SOLUTION INTRAMUSCULAR; INTRAVENOUS AS NEEDED
Status: DISCONTINUED | OUTPATIENT
Start: 2024-07-11 | End: 2024-07-11

## 2024-07-11 RX ORDER — POLYETHYLENE GLYCOL 3350 17 G/17G
17 POWDER, FOR SOLUTION ORAL DAILY PRN
Qty: 10 PACKET | Refills: 0 | Status: SHIPPED | OUTPATIENT
Start: 2024-07-11

## 2024-07-11 RX ORDER — MIDAZOLAM HYDROCHLORIDE 1 MG/ML
INJECTION INTRAMUSCULAR; INTRAVENOUS AS NEEDED
Status: DISCONTINUED | OUTPATIENT
Start: 2024-07-11 | End: 2024-07-11

## 2024-07-11 RX ORDER — ONDANSETRON HYDROCHLORIDE 2 MG/ML
INJECTION, SOLUTION INTRAVENOUS AS NEEDED
Status: DISCONTINUED | OUTPATIENT
Start: 2024-07-11 | End: 2024-07-11

## 2024-07-11 RX ORDER — CEFAZOLIN 1 G/1
INJECTION, POWDER, FOR SOLUTION INTRAVENOUS AS NEEDED
Status: DISCONTINUED | OUTPATIENT
Start: 2024-07-11 | End: 2024-07-11

## 2024-07-11 SDOH — HEALTH STABILITY: MENTAL HEALTH: CURRENT SMOKER: 0

## 2024-07-11 ASSESSMENT — PAIN SCALES - GENERAL
PAINLEVEL_OUTOF10: 6
PAINLEVEL_OUTOF10: 5 - MODERATE PAIN
PAINLEVEL_OUTOF10: 0 - NO PAIN
PAINLEVEL_OUTOF10: 6
PAINLEVEL_OUTOF10: 4
PAINLEVEL_OUTOF10: 0 - NO PAIN
PAINLEVEL_OUTOF10: 4
PAINLEVEL_OUTOF10: 4

## 2024-07-11 ASSESSMENT — PAIN - FUNCTIONAL ASSESSMENT
PAIN_FUNCTIONAL_ASSESSMENT: 0-10

## 2024-07-11 ASSESSMENT — COLUMBIA-SUICIDE SEVERITY RATING SCALE - C-SSRS
2. HAVE YOU ACTUALLY HAD ANY THOUGHTS OF KILLING YOURSELF?: NO
1. IN THE PAST MONTH, HAVE YOU WISHED YOU WERE DEAD OR WISHED YOU COULD GO TO SLEEP AND NOT WAKE UP?: NO
6. HAVE YOU EVER DONE ANYTHING, STARTED TO DO ANYTHING, OR PREPARED TO DO ANYTHING TO END YOUR LIFE?: NO

## 2024-07-11 ASSESSMENT — PAIN DESCRIPTION - LOCATION: LOCATION: ABDOMEN

## 2024-07-11 NOTE — ANESTHESIA PREPROCEDURE EVALUATION
"Patient: Blake CAMERON Masters \"SAIMA\"    Procedure Information       Date/Time: 07/11/24 1200    Procedure: Robotic Umbilical Hernia Repair; Mesh Placement (Abdomen)    Location: [facility] OR  / [facility] OR    Surgeons: Ze Patrick MD            Relevant Problems   Anesthesia   (+) Dental injury      Cardiac   (+) Abnormal EKG   (+) HLD (hyperlipidemia)   (+) Heart murmur on physical examination   (+) Heart murmur, systolic   (+) Hypertension, essential, benign      Neuro   (+) Anxiety and depression      GI   (+) Dysphagia, pharyngoesophageal phase   (+) Hiatal hernia      Endocrine   (+) Class 1 obesity due to excess calories with serious comorbidity and body mass index (BMI) of 30.0 to 30.9 in adult      HEENT   (+) Hearing loss of both ears       Clinical information reviewed:                    Past Medical History:   Diagnosis Date    Anxiety     Depression     Gilbert's syndrome     2.3.24: bili, total: 1.7    Hyperlipidemia     Hypertension     Hypertrophic obstructive cardiomyopathy (HOCM) (Multi)     cMRI 5/30:1. Hypertrophic obstructive cardiomyopathy. Basal anterior septum 1.6 cm. No evidence of left ventricular apical aneurysm. 2. Normal left ventricular cavity size with hyperdynamic systolic function. Ejection fraction 72%. 3. Asymmetric septal left ventricular hypertrophy. Basal anterior septum 1.6 cm. LV mass index 101 g/m2 (normal 57-91).    Left ventricular outflow tract obstruction (HHS-HCC)     severe - prompted cMRI which shwed hypertrophic obstructive cardiomyopathy    Pre-operative clearance     cardiac clearance requested    Umbilical hernia     Vitamin D deficiency       Past Surgical History:   Procedure Laterality Date    COLONOSCOPY      HERNIA REPAIR Left     inguinal hernia as a child    TONSILLECTOMY      UPPER GASTROINTESTINAL ENDOSCOPY       Social History     Tobacco Use    Smoking status: Former     Types: Cigarettes    Smokeless tobacco: Never    Tobacco comments:     Quit 2019 " - 1/2 PPD x 20 yrs    Vaping Use    Vaping status: Never Used   Substance Use Topics    Alcohol use: Yes     Alcohol/week: 5.0 standard drinks of alcohol     Types: 5 Standard drinks or equivalent per week    Drug use: Never      Current Outpatient Medications   Medication Instructions    amLODIPine (Norvasc) 10 mg tablet TAKE 1 TABLET BY MOUTH EVERY DAY    ascorbic acid (Vitamin C) 500 mg chewable tablet GNP Vitamin C 500 MG Oral Tablet   Refills: 0        Start : 2-Nov-2020   Active    atorvastatin (LIPITOR) 20 mg, oral, Daily    candesartan (ATACAND) 16 mg, oral, Daily    cetirizine (ZYRTEC) 10 mg, oral, Daily    chlorhexidine (Peridex) 0.12 % solution Swish for 30 seconds and spit 15mL of solution the night before and morning of surgery    doxycycline (Vibra-Tabs) 100 mg tablet Take 2 tablets by mouth within 24-72 hours after unprotected sexual encounter. Take with a full glass of water and do not lie down for at least 30 minutes after.    emtricitabine-tenofovir, TDF, (Truvada) 200-300 mg tablet 1 tablet, oral, Daily    escitalopram (LEXAPRO) 20 mg, oral, Daily    esomeprazole (NEXIUM) 40 mg, oral, Daily before breakfast, Do not open capsule.    metoprolol tartrate (LOPRESSOR) 25 mg, oral, 2 times daily    multivitamin tablet 1 tablet, oral, Daily    tadalafil (Cialis) 10 mg tablet Take 1 tablet (10 mg) by mouth once daily as needed.      Allergies   Allergen Reactions    Lisinopril Cough        Chemistry    Lab Results   Component Value Date/Time     07/05/2024 1259    K 4.3 07/05/2024 1259     07/05/2024 1259    CO2 26 07/05/2024 1259    BUN 11 07/05/2024 1259    CREATININE 0.87 07/05/2024 1259    Lab Results   Component Value Date/Time    CALCIUM 9.3 07/05/2024 1259    ALKPHOS 68 07/05/2024 1259    AST 31 07/05/2024 1259    ALT 23 07/05/2024 1259    BILITOT 0.9 07/05/2024 1259          Lab Results   Component Value Date    HGBA1C 4.9 02/03/2024     Lab Results   Component Value Date/Time    WBC  "6.0 07/05/2024 1259    HGB 15.5 07/05/2024 1259    HCT 44.3 07/05/2024 1259     07/05/2024 1259     No results found for: \"PROTIME\", \"PTT\", \"INR\"  No results found for: \"ABORH\"  Encounter Date: 03/22/24   ECG 12 lead (Clinic Performed)   Result Value    Ventricular Rate 70    Atrial Rate 70    GA Interval 174    QRS Duration 104    QT Interval 406    QTC Calculation(Bazett) 438    P Axis -8    R Axis -22    T Axis 110    QRS Count 12    Q Onset 217    P Onset 130    P Offset 179    T Offset 420    QTC Fredericia 427    Narrative    Normal sinus rhythm  Left ventricular hypertrophy with repolarization abnormality  Inferior infarct , age undetermined  Abnormal ECG  No previous ECGs available  Confirmed by Kaiden Miles ([id number]) on 3/29/2024 8:34:07 AM     No results found for this or any previous visit from the past 1095 days.       Visit Vitals  Smoking Status Former     No data recorded     Physical Exam    Airway  Mallampati: II  TM distance: >3 FB  Neck ROM: full     Cardiovascular - normal exam     Dental - normal exam     Pulmonary - normal exam     Abdominal - normal exam              Anesthesia Plan    History of general anesthesia?: yes  History of complications of general anesthesia?: no    ASA 2     general     The patient is not a current smoker.    intravenous induction   Postoperative administration of opioids is intended.  Anesthetic plan and risks discussed with patient.  Use of blood products discussed with patient who.    Plan discussed with CAA.        "

## 2024-07-11 NOTE — ANESTHESIA POSTPROCEDURE EVALUATION
"Patient: Blake CAMERON Masters \"SAIMA\"    Procedure Summary       Date: 07/11/24 Room / Location: [facility] OR 08 / [facility]    Anesthesia Start: 1243 Anesthesia Stop: 1451    Procedure: Robotic Umbilical Hernia Repair; Mesh Placement (Abdomen) Diagnosis:       Umbilical hernia without obstruction and without gangrene      (Umbilical hernia without obstruction and without gangrene [K42.9])    Surgeons: Ze Patrick MD Responsible Provider: Sylvester Hardy MD    Anesthesia Type: general ASA Status: 2            Anesthesia Type: general    Vitals Value Taken Time   /79 07/11/24 1531   Temp 36.5 °C (97.7 °F) 07/11/24 1447   Pulse 68 07/11/24 1538   Resp 14 07/11/24 1530   SpO2 95 % 07/11/24 1538   Vitals shown include unfiled device data.    Anesthesia Post Evaluation    Patient location during evaluation: PACU  Patient participation: complete - patient participated  Level of consciousness: awake  Pain management: adequate  Airway patency: patent  Cardiovascular status: acceptable  Respiratory status: acceptable  Hydration status: acceptable  Postoperative Nausea and Vomiting: none        There were no known notable events for this encounter.    "

## 2024-07-11 NOTE — ANESTHESIA PROCEDURE NOTES
Airway  Date/Time: 7/11/2024 1:06 PM  Urgency: elective    Airway not difficult    Staffing  Performed: JOSE ANGEL   Authorized by: Sylvester Hardy MD    Performed by: JOSE ANGEL Garcia  Patient location during procedure: OR    Indications and Patient Condition  Indications for airway management: anesthesia  Spontaneous ventilation: present  Sedation level: deep  Preoxygenated: yes  Patient position: sniffing  Mask difficulty assessment: 1 - vent by mask    Final Airway Details  Final airway type: endotracheal airway      Successful airway: ETT  Cuffed: yes   Successful intubation technique: direct laryngoscopy  Facilitating devices/methods: cricoid pressure and intubating stylet  Endotracheal tube insertion site: oral  Blade: Isela  Blade size: #4  ETT size (mm): 7.5  Cormack-Lehane Classification: grade I - full view of glottis  Placement verified by: chest auscultation, capnometry and palpation of cuff   Measured from: lips  ETT to lips (cm): 22  Number of attempts at approach: 1

## 2024-07-11 NOTE — OP NOTE
"Robotic Umbilical Hernia Repair; Mesh Placement Operative Note     Date: 2024  OR Location: [facility] OR    Name: Blake Dominique \"GALA", : 1970, Age: 54 y.o., MRN: [id number], Sex: male    Diagnosis  Pre-op Diagnosis      * Umbilical hernia without obstruction and without gangrene [K42.9] Post-op Diagnosis     * Umbilical hernia without obstruction and without gangrene [K42.9]     Procedures  Robotic assisted repair of an umbilical hernia 3.5 x 3 cm. rTAPP technique    Surgeons      * Ze Patrick - Primary    Resident/Fellow/Other Assistant:  Surgeons and Role:  * No surgeons found with a matching role *  PGY 4  Procedure Summary  Anesthesia: General  ASA: II  Anesthesia Staff: Anesthesiologist: Sylvester Hardy MD  C-AA: JOSE ANGEL Garcia  Estimated Blood Loss: 5mL  Intra-op Medications:   Administrations occurring from 1200 to 1430 on 24:   Medication Name Total Dose   sodium chloride 0.9 % irrigation solution 1,000 mL              Anesthesia Record               Intraprocedure I/O Totals       None           Specimen: No specimens collected     Staff:   Jenaulator: Ling  Circulator: Cherrie Keenan Scrub: Patricia  Scrub Person: Mason  Scrub Person: Lily Keenan Circulator: Katherine Keenan Circulator: Sadaf         Drains and/or Catheters: * None in log *    Tourniquet Times:         Implants:  Implants       Type Name Action Serial No.      Surgical Mesh Sling Implant MESH, PROGRIP LAP, 10 X 15 CM, FLATSHEET - [id number] Implanted               Findings: Umbilical hernia containing chronically incarcerated preperitoneal fat    Indications: SAIMA Dominique is an 54 y.o. male who is having surgery for Umbilical hernia without obstruction and without gangrene [K42.9].     The patient was seen in the preoperative area. The risks, benefits, complications, treatment options, non-operative alternatives, expected recovery and outcomes were discussed with the patient. The possibilities of reaction to " medication, pulmonary aspiration, injury to surrounding structures, bleeding, recurrent infection, the need for additional procedures, failure to diagnose a condition, and creating a complication requiring transfusion or operation were discussed with the patient. The patient concurred with the proposed plan, giving informed consent.  The site of surgery was properly noted/marked if necessary per policy. The patient has been actively warmed in preoperative area. Preoperative antibiotics have been ordered and given within 1 hours of incision. Venous thrombosis prophylaxis have been ordered including bilateral sequential compression devices    Procedure Details: Patient was brought to the OR placed supine.  Timeout was performed confirm patient procedure.  Antibiotics were given general anesthesia was then administered through an endotracheal tube.  We prepped and draped the abdominal wall sterilely with the left arm tucked.  Injected Marcaine made as a costal left-sided incision with scalpel.  Fascia was incised we entered peritoneal cavity the balloon port and insufflated.  Robotic 30 degree camera was introduced.  I placed 3 left lateral 8 mm robotic ports and then we docked the robot.  Obvious hernia was noted.  It was 3 x 3-1/2 cm.  Starting at the lateral edge of the left rectus muscle I started to develop my peritoneal flap working around way across the midline to the lateral edge of the right rectus muscle.  In doing so I was able to reduce a large amount of preperitoneal fat from the umbilical defect.  Once a sufficient preperitoneal pocket was developed I went ahead and closed the hernia by reestablishing the linea alba with a running #1 nonabsorbable V-Loc suture.  We then introduced the 10 x 15 ProGrip mesh.  This was placed with the self affixing side up against the anterior abdominal wall.  I further secured it with some interrupted 3-0 Vicryl sutures.  I then closed the peritoneal flap to completely  cover up the mesh with a running 3 0 V-Loc suture.  All the needles were accounted for and removed.  We then undocked the robot and removed our ports.  We desufflated and closed the left upper quadrant fascia with 0 Vicryl.  Skin incisions were closed with 4-0 Monocryl and Dermabond the patient ultimately was extubated and transferred to recovery room in satisfactory condition.    Complications:  None; patient tolerated the procedure well.    Disposition: PACU - hemodynamically stable.  Condition: stable         Additional Details:     Attending Attestation:     Ze Patrick  Phone Number: [phone]

## 2024-07-11 NOTE — NURSING NOTE
1447: Patient to bay with anesthesia and surgical team present. Handoff report and plan of care reviewed, all questions answered. VSS.    1500: Family updated via phone call    1502: Patient tolerating sips of ginger ale and goldfish crackers at this time    1509: 5mg oxycodone administered per given orders, allergies verified prior to administration    1522: Friend Theresa updated via phone call. Discharge instructions reviewed at this time via phone call. No further questions at this time.    1539: Patient meets phase one discharge criteria at this time    1540: Patient to phase two in stable condition. Handoff given to Emilia SHORE.

## 2024-07-12 ASSESSMENT — PAIN SCALES - GENERAL: PAINLEVEL_OUTOF10: 0 - NO PAIN

## 2024-07-16 LAB — BODY SURFACE AREA: 2.37 M2

## 2024-07-29 NOTE — PROGRESS NOTES
[facility] [place]   Hypertrophic Cardiomyopathy Center    Primary Care Physician: Antonio Perera DO  Primary Cardiologist:  Dr. Kaiden Miles  Date of Visit: 2024  3:40 PM EDT     HPI / Summary:   Blake Dominique is a 54 y.o. male with obstructive HCM who was referred for comprehensive HCM evaluation.     Approximately 4 years ago, he ran up a flight of stairs and felt dizzy.  As a result, he had a stress test (stress echo) which was negative for ischemia.  He notices some orthostatic symptoms occasionally.  This year, his primary care doctor noted that his murmur sounded worse and as a result he was referred to Dr. Miles.  Dr. Miles did an echocardiogram which was suspicious for hypertrophic cardiomyopathy.  As a result he had a cardiac MRI which confirmed the diagnosis.  As a result, he was referred to the HCM center for comprehensive evaluation.    Prior to his hernia surgery, he was at the gym 3 days/week.  He does cardio on the elliptical and light resistance training.  He works at a moderate intensity effort by putting a sweat.  He believes he does approximately 4 mph on the elliptical.  He has no palpitations.  He reports that in the past he was able to jog a mile without stopping.  However, now he needs to jog/walk the mile.  He has never had syncope.    HCM History  The patient was initially diagnosed in  when an echocardiogram was suggestive of hypertrophic cardiomyopathy   Family History of HCM: No  NYHA Class: II  Wall thickness: 1.6  EF: 65 to 70%  LVOT obstruction: Yes  ICD or PPM: None  Prior septal reduction therapy: None  Genetic Testing: Pending  Parents:   Mother -  at age 82, dementia, fell and broke her hip  Father - vasculopath, had a stroke, MI, fem pop, AAA, heavy smoker  Children: None  Siblings:   Has 3 older brothers and an older sister - no known HCM  None have been screened    Sudden Cardiac Arrest Risk Factors:   Syncope:  No  Wall thickness above 30 mm: No  Apical aneurysm: None  NSVT by ambulatory monitor: No  EF less than 50%: No  LGE by CMR: Minimal 2024  Family history of SCA in a family member with HCM: No  SCA risk: Likely low    ROS: Relevant review of symptoms of negative unless discussed above.     Problems:   Patient Active Problem List   Diagnosis    Abnormal EKG    Anxiety and depression    Chest heaviness    Dysphagia, pharyngoesophageal phase    Eosinophilic esophagitis    Food impaction of esophagus    Gilbert's syndrome    Heart murmur on physical examination    Heart murmur, systolic    Hiatal hernia    Insomnia    HLD (hyperlipidemia)    Hypertension, essential, benign    Hypokalemia    Hearing loss of both ears    Class 1 obesity due to excess calories with serious comorbidity and body mass index (BMI) of 30.0 to 30.9 in adult    Vitamin D deficiency    Left ventricular outflow tract obstruction (HHS-HCC)    Asymmetric septal hypertrophy (Multi)    Umbilical hernia without obstruction and without gangrene    Dental injury    Cardiomyopathy, hypertrophic (Multi)       Medical History:   Past Medical History:   Diagnosis Date    Anxiety     Depression     Gilbert's syndrome     2.3.24: bili, total: 1.7    Hyperlipidemia     Hypertension     Hypertrophic obstructive cardiomyopathy (HOCM) (Multi)     cMRI 5/30:1. Hypertrophic obstructive cardiomyopathy. Basal anterior septum 1.6 cm. No evidence of left ventricular apical aneurysm. 2. Normal left ventricular cavity size with hyperdynamic systolic function. Ejection fraction 72%. 3. Asymmetric septal left ventricular hypertrophy. Basal anterior septum 1.6 cm. LV mass index 101 g/m2 (normal 57-91).    Left ventricular outflow tract obstruction (HHS-HCC)     severe - prompted cMRI which shwed hypertrophic obstructive cardiomyopathy    Pre-operative clearance     cardiac clearance requested    Umbilical hernia     Vitamin D deficiency        Surgical Hx:   Past Surgical  "History:   Procedure Laterality Date    COLONOSCOPY      HERNIA REPAIR Left     inguinal hernia as a child    TONSILLECTOMY      UMBILICAL HERNIA REPAIR  07/11/2024    UPPER GASTROINTESTINAL ENDOSCOPY          Family Hx:   Family History   Problem Relation Name Age of Onset    Dementia Mother      Other (Cardiac disorder) Father      Stroke Father      Colon cancer Other cousin         Social Hx:  Fun: travel, walks his dog, [employer], gym ([employer])  Occupation/School: [employer]  EtOH/Smoking/Drugs: no smoking, rare alcohol, no drugs, supplement    Medications  Current Outpatient Medications   Medication Instructions    amLODIPine (Norvasc) 10 mg tablet TAKE 1 TABLET BY MOUTH EVERY DAY    ascorbic acid (Vitamin C) 500 mg chewable tablet GNP Vitamin C 500 MG Oral Tablet   Refills: 0        Start : 2-Nov-2020   Active    atorvastatin (LIPITOR) 20 mg, oral, Daily    candesartan (ATACAND) 16 mg, oral, Daily    cetirizine (ZYRTEC) 10 mg, oral, Daily    emtricitabine-tenofovir, TDF, (Truvada) 200-300 mg tablet 1 tablet, oral, Daily    escitalopram (LEXAPRO) 20 mg, oral, Daily    esomeprazole (NEXIUM) 40 mg, oral, Daily before breakfast, Do not open capsule.    metoprolol tartrate (LOPRESSOR) 25 mg, oral, 2 times daily    multivitamin tablet 1 tablet, oral, Daily    tadalafil (Cialis) 10 mg tablet Take 1 tablet (10 mg) by mouth once daily as needed.       Allergies  Lisinopril    Exam:   Vitals: /79 (BP Location: Right arm, Patient Position: Sitting)   Pulse 74   Ht 1.88 m (6' 2\")   Wt 108 kg (238 lb 4 oz)   SpO2 94%   BMI 30.59 kg/m²   Wt Readings from Last 5 Encounters:   08/01/24 108 kg (238 lb 4 oz)   08/01/24 109 kg (240 lb)   07/11/24 112 kg (247 lb 2.2 oz)   07/05/24 108 kg (238 lb 1.6 oz)   05/29/24 110 kg (242 lb 9.6 oz)     GEN: Pleasant, well-appearing, no acute distress.  HEENT: JVP not elevated  CHEST: Clear to auscultation, No wheeze, good air movement.  CV: normal rate, regular rhythm, harsh " "systolic murmur which increases with Valsalva  ABD: Soft  EXT: Warm, well perfused, No LE edema.   NEURO: grossly non focal  SKIN: No obvious rashes     Labs:   Lipids  Lab Results   Component Value Date    CHOL 132 02/03/2024    CHOL 141 01/28/2023    CHOL 149 09/03/2022     Lab Results   Component Value Date    HDL 31.0 02/03/2024    HDL 37.9 (A) 01/28/2023    HDL 37.7 (A) 09/03/2022     Lab Results   Component Value Date    LDLCALC 70 02/03/2024     Lab Results   Component Value Date    TRIG 157 (H) 02/03/2024    TRIG 203 (H) 01/28/2023    TRIG 187 (H) 09/03/2022     No components found for: \"CHOLHDL\"    Hemoglobin A1C  Lab Results   Component Value Date    HGBA1C 4.9 02/03/2024       BMP  Lab Results   Component Value Date    GLUCOSE 93 07/05/2024    CALCIUM 9.3 07/05/2024     07/05/2024    K 4.3 07/05/2024    CO2 26 07/05/2024     07/05/2024    BUN 11 07/05/2024    CREATININE 0.87 07/05/2024         Notable Studies: imaging personally reviewed and summarized by me below  EKG:  -3/22/2024: NSR, LVH with TWI in I and avL, no other ST changes  -8/2/2024: NSR, LVH, Q in III and avF    Echo:  -4/18/2024: LVEF 70%, asymmetric septal hypertrophy, resting LVOT gradient is 72 mmHg, moderate MAC with obstructive septal contact, max IVS 1.6 cm    Ambulatory Rhythm Monitor:   -7/5/24-7/12/24: HR  bpm, average HR 76 bpm, <1% PVCs, <1% PACs, 4 episodes of SVT with fastest 142 bpm, longest being 5 beats.     Cardiac MRI:  -5/30/2024: FABIENNE up to 1.6 cm, with MIGUEL suggestive of LVOT obstruction, no significant LGE, LVEF 72%, thickened redundant mitral valve leaflets contributing to MIGUEL, moderate MR    Stress Test:  -Pending cardiopulmonary exercise test.    Assessment and Plan  Blake Dominique is a 54 y.o. male with obstructive HCM who was referred for comprehensive HCM evaluation.     #Symptoms: Overall, he is likely NYHA class II.  He used to be able to run a mile without stopping easily, but now he needs to " jog/walk a mile with intermittent stopping.  This is likely, at least in part, due to his obstruction, which based on resting echocardiogram is approximately 70 mmHg at rest.  We discussed several options for treating his obstruction.  The first option would be to reduce his antihypertensive therapy and increase the beta-blocker, but this has the downside of exposing him to downstream complications from longstanding hypertension.  There is also small risk that he may not tolerate the increase in beta-blocker for various reasons.  A second option would be to initiate mavacamten for reduction in gradient and improvement in functional capacity.  The third option would be for a septal myectomy.  His septal wall thickness of approximately 1.5 to 1.6 cm is on the thinner side for myectomy and he is not in favor of that strategy.  He would like to trial mavacamten to start as he is suspicious he may not improve as much with simple afterload reduction and beta blocker increase.  We will arrange for this as well as a cardiopulmonary exercise test to evaluate for arrhythmias during exercise and to come up with an appropriate exercise prescription for him given his condition.    #Family Screening: Both of his parents are , not from hypertrophic cardiomyopathy.  He has 4 older siblings, none of them have been screened yet.  We will have him do genetic testing to identify a potential pathogenic mutation and have encouraged him to tell his siblings to have an echocardiogram and ECG as well.    #SCA risk: He does not have any high risk features for sudden cardiac death from hypertrophic cardiopathy.  We discussed there is no such thing as 0 risk, but he is low risk.    Follow up after his cardiopulmonary exercise test and initial echocardiogram on mavacamten.    He will continue to follow with Dr. Miles as his primary cardiologist and with me for HCM related care.     Kwabena Reyes MD  Director, Sports  Cardiology  Hypertrophic Cardiomyopathy [facility]    Part of this note was completed using dictation and voice recognition software. Please excuse minor errors and typos.      Time Spent: I spent 62 minutes reviewing medical testing, obtaining medical history and counselling and educating on diagnosis and documenting clinical encounter.

## 2024-07-31 PROBLEM — I42.2 CARDIOMYOPATHY, HYPERTROPHIC (MULTI): Status: ACTIVE | Noted: 2024-07-31

## 2024-08-01 ENCOUNTER — OFFICE VISIT (OUTPATIENT)
Dept: SURGERY | Facility: CLINIC | Age: 54
End: 2024-08-01
Payer: COMMERCIAL

## 2024-08-01 ENCOUNTER — LAB (OUTPATIENT)
Dept: LAB | Facility: LAB | Age: 54
End: 2024-08-01
Payer: COMMERCIAL

## 2024-08-01 ENCOUNTER — OFFICE VISIT (OUTPATIENT)
Dept: CARDIOLOGY | Facility: CLINIC | Age: 54
End: 2024-08-01
Payer: COMMERCIAL

## 2024-08-01 VITALS
DIASTOLIC BLOOD PRESSURE: 79 MMHG | TEMPERATURE: 97.7 F | SYSTOLIC BLOOD PRESSURE: 122 MMHG | WEIGHT: 240 LBS | HEART RATE: 73 BPM | BODY MASS INDEX: 30.8 KG/M2 | HEIGHT: 74 IN

## 2024-08-01 VITALS
HEART RATE: 74 BPM | SYSTOLIC BLOOD PRESSURE: 130 MMHG | DIASTOLIC BLOOD PRESSURE: 79 MMHG | OXYGEN SATURATION: 94 % | HEIGHT: 74 IN | BODY MASS INDEX: 30.58 KG/M2 | WEIGHT: 238.25 LBS

## 2024-08-01 DIAGNOSIS — K43.9 VENTRAL HERNIA WITHOUT OBSTRUCTION OR GANGRENE: Primary | ICD-10-CM

## 2024-08-01 DIAGNOSIS — Z77.21 PERSONAL HISTORY OF EXPOSURE TO POTENTIALLY HAZARDOUS BODY FLUIDS: ICD-10-CM

## 2024-08-01 DIAGNOSIS — I42.2 CARDIOMYOPATHY, HYPERTROPHIC (MULTI): Primary | ICD-10-CM

## 2024-08-01 DIAGNOSIS — I42.1 HOCM (HYPERTROPHIC OBSTRUCTIVE CARDIOMYOPATHY) (MULTI): ICD-10-CM

## 2024-08-01 LAB
ALBUMIN SERPL BCP-MCNC: 4.7 G/DL (ref 3.4–5)
ANION GAP SERPL CALC-SCNC: 14 MMOL/L (ref 10–20)
BUN SERPL-MCNC: 17 MG/DL (ref 6–23)
CALCIUM SERPL-MCNC: 9.5 MG/DL (ref 8.6–10.6)
CHLORIDE SERPL-SCNC: 105 MMOL/L (ref 98–107)
CO2 SERPL-SCNC: 24 MMOL/L (ref 21–32)
CREAT SERPL-MCNC: 0.77 MG/DL (ref 0.5–1.3)
EGFRCR SERPLBLD CKD-EPI 2021: >90 ML/MIN/1.73M*2
GLUCOSE SERPL-MCNC: 100 MG/DL (ref 74–99)
HIV 1+2 AB+HIV1 P24 AG SERPL QL IA: NONREACTIVE
PHOSPHATE SERPL-MCNC: 3.5 MG/DL (ref 2.5–4.9)
POTASSIUM SERPL-SCNC: 3.7 MMOL/L (ref 3.5–5.3)
SODIUM SERPL-SCNC: 139 MMOL/L (ref 136–145)
TREPONEMA PALLIDUM IGG+IGM AB [PRESENCE] IN SERUM OR PLASMA BY IMMUNOASSAY: NONREACTIVE

## 2024-08-01 PROCEDURE — 3074F SYST BP LT 130 MM HG: CPT | Performed by: SURGERY

## 2024-08-01 PROCEDURE — 87491 CHLMYD TRACH DNA AMP PROBE: CPT

## 2024-08-01 PROCEDURE — 1036F TOBACCO NON-USER: CPT | Performed by: SURGERY

## 2024-08-01 PROCEDURE — 86780 TREPONEMA PALLIDUM: CPT

## 2024-08-01 PROCEDURE — 3008F BODY MASS INDEX DOCD: CPT | Performed by: INTERNAL MEDICINE

## 2024-08-01 PROCEDURE — 80069 RENAL FUNCTION PANEL: CPT

## 2024-08-01 PROCEDURE — 99213 OFFICE O/P EST LOW 20 MIN: CPT | Performed by: SURGERY

## 2024-08-01 PROCEDURE — 93005 ELECTROCARDIOGRAM TRACING: CPT | Performed by: INTERNAL MEDICINE

## 2024-08-01 PROCEDURE — 3074F SYST BP LT 130 MM HG: CPT | Performed by: INTERNAL MEDICINE

## 2024-08-01 PROCEDURE — 3008F BODY MASS INDEX DOCD: CPT | Performed by: SURGERY

## 2024-08-01 PROCEDURE — 1036F TOBACCO NON-USER: CPT | Performed by: INTERNAL MEDICINE

## 2024-08-01 PROCEDURE — 87389 HIV-1 AG W/HIV-1&-2 AB AG IA: CPT

## 2024-08-01 PROCEDURE — 3078F DIAST BP <80 MM HG: CPT | Performed by: SURGERY

## 2024-08-01 PROCEDURE — 87591 N.GONORRHOEAE DNA AMP PROB: CPT

## 2024-08-01 PROCEDURE — 36415 COLL VENOUS BLD VENIPUNCTURE: CPT

## 2024-08-01 PROCEDURE — 99245 OFF/OP CONSLTJ NEW/EST HI 55: CPT | Performed by: INTERNAL MEDICINE

## 2024-08-01 PROCEDURE — 3078F DIAST BP <80 MM HG: CPT | Performed by: INTERNAL MEDICINE

## 2024-08-01 ASSESSMENT — COLUMBIA-SUICIDE SEVERITY RATING SCALE - C-SSRS
1. IN THE PAST MONTH, HAVE YOU WISHED YOU WERE DEAD OR WISHED YOU COULD GO TO SLEEP AND NOT WAKE UP?: NO
6. HAVE YOU EVER DONE ANYTHING, STARTED TO DO ANYTHING, OR PREPARED TO DO ANYTHING TO END YOUR LIFE?: NO
2. HAVE YOU ACTUALLY HAD ANY THOUGHTS OF KILLING YOURSELF?: NO

## 2024-08-01 ASSESSMENT — PAIN SCALES - GENERAL
PAINLEVEL: 0-NO PAIN
PAINLEVEL: 0-NO PAIN

## 2024-08-01 ASSESSMENT — ENCOUNTER SYMPTOMS: DEPRESSION: 0

## 2024-08-01 NOTE — PROGRESS NOTES
Assessment/Plan   Excellent recovery following recent robotic assisted preperitoneal repair of a moderate-sized umbilical hernia.  He is cleared to resume activity at work on August 5.  Follow-up with me in 6 weeks for final assessment    Subjective   EJ doing well this week.  Status post robotic assisted repair of a umbilical hernia.  Not having much pain at all.  He is ready to go back to work next week he feels.       Objective     Physical Exam  NAD  A&Ox3  Non icteric  CTA  RR  Abdomen soft min tender. Wounds clean, intact.  No clinical evidence of recurrent hernia.  No seroma.  Extremities warm, well perfused         Relevant Results      No results found for this or any previous visit (from the past 24 hour(s)).        I spent 25 minutes in the professional and overall care of this patient.      Ze Patrick MD

## 2024-08-01 NOTE — LETTER
August 1, 2024     Antonio Perera DO  [address]  [address]  [facility] [address]    Patient: SAIMA Dominique   YOB: 1970   Date of Visit: 8/1/2024       Dear Dr. Antonio Perera DO:    Thank you for referring SAIMA Dominique to me for evaluation. Below are my notes for this consultation.  If you have questions, please do not hesitate to call me. I look forward to following your patient along with you.       Sincerely,     Ze Patrick MD      CC: No Recipients  ______________________________________________________________________________________    Assessment/Plan  Excellent recovery following recent robotic assisted preperitoneal repair of a moderate-sized umbilical hernia.  He is cleared to resume activity at work on August 5.  Follow-up with me in 6 weeks for final assessment    Subjective  EJ doing well this week.  Status post robotic assisted repair of a umbilical hernia.  Not having much pain at all.  He is ready to go back to work next week he feels.       Objective    Physical Exam  NAD  A&Ox3  Non icteric  CTA  RR  Abdomen soft min tender. Wounds clean, intact.  No clinical evidence of recurrent hernia.  No seroma.  Extremities warm, well perfused         Relevant Results      No results found for this or any previous visit (from the past 24 hour(s)).        I spent 25 minutes in the professional and overall care of this patient.      Ze Patrick MD

## 2024-08-02 LAB
C TRACH RRNA SPEC QL NAA+PROBE: NEGATIVE
N GONORRHOEA DNA SPEC QL PROBE+SIG AMP: NEGATIVE

## 2024-08-05 ENCOUNTER — TELEPHONE (OUTPATIENT)
Dept: CARDIOLOGY | Facility: HOSPITAL | Age: 54
End: 2024-08-05
Payer: COMMERCIAL

## 2024-08-05 DIAGNOSIS — Z77.21 PERSONAL HISTORY OF EXPOSURE TO POTENTIALLY HAZARDOUS BODY FLUIDS: ICD-10-CM

## 2024-08-05 NOTE — TELEPHONE ENCOUNTER
Patient has agreed to start on Camzyos (Mavacamten), to treat symptomatic obstructive hypertrophic cardiomyopathy via shared decision making with Dr. Reyes. Education, Camzyos REMs certification completed.  Questions & concerns addressed to ensure safe administration which included the following:    Heart Failure: The most serious risk of treatment with Camzyos (Mavacamten) is heart failure due to systolic dysfunction.  Patient informed to call, or seek medical attention for symptoms of heart failure.   Shortness of breath    Chest pain   Fatigue   Palpitations   Swelling in the legs   Rapid weight gain    Drug Interactions:  Certain medications/supplements can increase or decrease the exposure of Camzyos (Mavacamten) in your bloodstream.  Both prescription & over- the- counter medications that are contraindicated were reviewed.  Advised patient to call department & dispensing pharmacy before starting or stopping any medication, including over-the- counter preparations.    Echocardiograms: Patient aware & agrees to comply with required limited echocardiograms for appropriate dose titration.      Week  4   Week   8   Week 12   4 weeks after a change in dose   4 weeks after treatment interruption    4 weeks & 12 weeks after staring certain medications that are known to affect Camzyos     (Mavacamten)      Specialty Pharmacy:  patient aware Camzyos (Mavacamten) is dispensed by certain pharmacies nationwide.  Ongoing treatment with Camzyos (Mavacamten) will include a pharmacist.   It is imperative that the pharmacist be made aware of  any medication changes.  Patient will speak with a pharmacist monthly prior to 30-day drug dispense    Patient able to verbalize an understanding of the Risk Evaluation & Mitigation Strategy (REMS).  REMS certification & Patient Enrollment form completed on with patient consent. Camzyos REMS Patient Brochure given to patient.      Patient provided direct contact information to call HCM  Nurse Navigator @ [phone] should any questions or concerns arise.      PA approved    Camzyos Co-Pay card activated    Member ID: [id number]  Group:           [id number]  RX Bin:         [id number]  RX PCN:       [id number]     Echocardiogram Co-Pay card (connective -[phone])  Member ID: [id number]  Group:  [id number]

## 2024-08-06 LAB
ATRIAL RATE: 67 BPM
P AXIS: 12 DEGREES
P OFFSET: 187 MS
P ONSET: 133 MS
PR INTERVAL: 164 MS
Q ONSET: 215 MS
QRS COUNT: 11 BEATS
QRS DURATION: 100 MS
QT INTERVAL: 418 MS
QTC CALCULATION(BAZETT): 441 MS
QTC FREDERICIA: 434 MS
R AXIS: -19 DEGREES
T AXIS: 42 DEGREES
T OFFSET: 424 MS
VENTRICULAR RATE: 67 BPM

## 2024-08-06 PROCEDURE — RXMED WILLOW AMBULATORY MEDICATION CHARGE

## 2024-08-06 RX ORDER — EMTRICITABINE AND TENOFOVIR DISOPROXIL FUMARATE 200; 300 MG/1; MG/1
1 TABLET, FILM COATED ORAL DAILY
Qty: 30 TABLET | Refills: 2 | Status: SHIPPED | OUTPATIENT
Start: 2024-08-06

## 2024-08-08 DIAGNOSIS — I42.1 HOCM (HYPERTROPHIC OBSTRUCTIVE CARDIOMYOPATHY) (MULTI): Primary | ICD-10-CM

## 2024-08-08 RX ORDER — MAVACAMTEN 5 MG/1
5 CAPSULE, GELATIN COATED ORAL DAILY
COMMUNITY

## 2024-08-14 ENCOUNTER — SPECIALTY PHARMACY (OUTPATIENT)
Dept: PHARMACY | Facility: CLINIC | Age: 54
End: 2024-08-14

## 2024-08-15 ENCOUNTER — PHARMACY VISIT (OUTPATIENT)
Dept: PHARMACY | Facility: CLINIC | Age: 54
End: 2024-08-15
Payer: COMMERCIAL

## 2024-08-23 DIAGNOSIS — I10 ESSENTIAL (PRIMARY) HYPERTENSION: ICD-10-CM

## 2024-08-23 RX ORDER — CANDESARTAN 16 MG/1
16 TABLET ORAL DAILY
Qty: 90 TABLET | Refills: 3 | Status: SHIPPED | OUTPATIENT
Start: 2024-08-23

## 2024-08-23 RX ORDER — CHLORTHALIDONE 25 MG/1
25 TABLET ORAL DAILY
Qty: 90 TABLET | Refills: 3 | OUTPATIENT
Start: 2024-08-23

## 2024-08-27 DIAGNOSIS — I10 ESSENTIAL (PRIMARY) HYPERTENSION: ICD-10-CM

## 2024-08-27 RX ORDER — CHLORTHALIDONE 25 MG/1
25 TABLET ORAL DAILY
Qty: 90 TABLET | Refills: 3 | OUTPATIENT
Start: 2024-08-27

## 2024-09-03 ENCOUNTER — HOSPITAL ENCOUNTER (OUTPATIENT)
Dept: CARDIOLOGY | Facility: CLINIC | Age: 54
Discharge: HOME | End: 2024-09-03
Payer: COMMERCIAL

## 2024-09-03 ENCOUNTER — CLINICAL SUPPORT (OUTPATIENT)
Dept: CARDIAC REHAB | Facility: HOSPITAL | Age: 54
End: 2024-09-03
Payer: COMMERCIAL

## 2024-09-03 ENCOUNTER — TELEPHONE (OUTPATIENT)
Dept: CARDIOLOGY | Facility: HOSPITAL | Age: 54
End: 2024-09-03

## 2024-09-03 VITALS
RESPIRATION RATE: 20 BRPM | HEIGHT: 74 IN | OXYGEN SATURATION: 99 % | BODY MASS INDEX: 30.56 KG/M2 | SYSTOLIC BLOOD PRESSURE: 108 MMHG | DIASTOLIC BLOOD PRESSURE: 68 MMHG | WEIGHT: 238.1 LBS | HEART RATE: 78 BPM

## 2024-09-03 DIAGNOSIS — I42.1 HOCM (HYPERTROPHIC OBSTRUCTIVE CARDIOMYOPATHY) (MULTI): ICD-10-CM

## 2024-09-03 LAB
EJECTION FRACTION APICAL 4 CHAMBER: 62.5
EJECTION FRACTION: 68 %
LEFT ATRIUM VOLUME AREA LENGTH INDEX BSA: 28.9 ML/M2
LEFT VENTRICLE INTERNAL DIMENSION DIASTOLE: 4.62 CM (ref 3.5–6)
MITRAL VALVE E/A RATIO: 1.13
RIGHT VENTRICLE FREE WALL PEAK S': 7 CM/S
RIGHT VENTRICLE PEAK SYSTOLIC PRESSURE: 25.8 MMHG
TRICUSPID ANNULAR PLANE SYSTOLIC EXCURSION: 2.2 CM

## 2024-09-03 PROCEDURE — 94621 CARDIOPULM EXERCISE TESTING: CPT

## 2024-09-03 PROCEDURE — 93325 DOPPLER ECHO COLOR FLOW MAPG: CPT | Performed by: STUDENT IN AN ORGANIZED HEALTH CARE EDUCATION/TRAINING PROGRAM

## 2024-09-03 PROCEDURE — 93325 DOPPLER ECHO COLOR FLOW MAPG: CPT

## 2024-09-03 PROCEDURE — 93321 DOPPLER ECHO F-UP/LMTD STD: CPT | Performed by: STUDENT IN AN ORGANIZED HEALTH CARE EDUCATION/TRAINING PROGRAM

## 2024-09-03 PROCEDURE — 93308 TTE F-UP OR LMTD: CPT | Performed by: STUDENT IN AN ORGANIZED HEALTH CARE EDUCATION/TRAINING PROGRAM

## 2024-09-03 NOTE — PROGRESS NOTES
Cardiopulmonary (Metabolic) Stress Test    PATIENT: Blake Dominique  DATE: 9/3/2024  AGE/: 54 y.o./1970  REFERRING PHYSICIAN: Kwabena Reyes MD    Cardiopulmonary Stress Test was completed today in Cardiopulmonary Stress Lab at [facility]. Correct procedure and correct patient verified verbally and with ID Band checked.    Vitals:    24 1415   BP: 108/68   Pulse: 78   Resp: 20   SpO2: 99%     Vitals:    24 1415   Weight: 108 kg (238 lb 1.6 oz)   Body mass index is 30.56 kg/m².    Please see complete testing results for order in [employer] reporting with final physician interpretation.  Patient has met discharge criteria and has been discharged to home.

## 2024-09-03 NOTE — RESULT ENCOUNTER NOTE
LVOT gradient much improved, EF normal. Maintain current dose of mavacamten. Spoke with EJ over the phone. He is feeling better.

## 2024-09-03 NOTE — TELEPHONE ENCOUNTER
Phoned patient & reviewed echocardiogram results.  Patient aware to continue same dose of Camzyos 5mg (mavacamten) based on LVEF & LVOT gradient parameters. Camzyos REMs portal updated.   Patient aware of next echocardiogram date 9/30/24. Patient states that he feels great.  Was in [place] this past weekend & was able to walk the city without having fatigue & SOB.  Patient denies symptoms of HF.  Reviewed current medications & counseled on drug to drug interactions. Instructed to call HCM clinic for any questions, clarification, or concerns.    Phoned [facility] & spoke with pharmacist.  Aware that Dr. Reyes is keeping the patient on 5mg as a clinical decision.

## 2024-09-16 ENCOUNTER — OFFICE VISIT (OUTPATIENT)
Dept: SURGERY | Facility: CLINIC | Age: 54
End: 2024-09-16
Payer: COMMERCIAL

## 2024-09-16 ENCOUNTER — SPECIALTY PHARMACY (OUTPATIENT)
Dept: PHARMACY | Facility: CLINIC | Age: 54
End: 2024-09-16

## 2024-09-16 VITALS
SYSTOLIC BLOOD PRESSURE: 134 MMHG | BODY MASS INDEX: 31.57 KG/M2 | DIASTOLIC BLOOD PRESSURE: 87 MMHG | TEMPERATURE: 98.2 F | HEART RATE: 72 BPM | OXYGEN SATURATION: 99 % | WEIGHT: 246 LBS

## 2024-09-16 DIAGNOSIS — Z98.890 HX OF UMBILICAL HERNIA REPAIR: Primary | ICD-10-CM

## 2024-09-16 DIAGNOSIS — Z87.19 HX OF UMBILICAL HERNIA REPAIR: Primary | ICD-10-CM

## 2024-09-16 PROCEDURE — 99213 OFFICE O/P EST LOW 20 MIN: CPT | Performed by: SURGERY

## 2024-09-16 PROCEDURE — 3075F SYST BP GE 130 - 139MM HG: CPT | Performed by: SURGERY

## 2024-09-16 PROCEDURE — 3079F DIAST BP 80-89 MM HG: CPT | Performed by: SURGERY

## 2024-09-16 PROCEDURE — RXMED WILLOW AMBULATORY MEDICATION CHARGE

## 2024-09-16 ASSESSMENT — PAIN SCALES - GENERAL: PAINLEVEL: 0-NO PAIN

## 2024-09-16 NOTE — PROGRESS NOTES
Assessment/Plan   Excellent outcome now 2 months out from robotic assisted preperitoneal repair of an umbilical hernia.  He is cleared to resume all activities without specific limitations.  Follow-up with me as needed    Subjective   2-month follow-up after umbilical hernia repair.  Doing well.  No pain or swelling       Objective     Physical Exam  NAD  A&Ox3  Non icteric  CTA  RR  Abdomen soft min tender. Wounds clean, intact.  Good repair.  No clinical evidence of recurrence  Extremities warm, well perfused         Relevant Results      No results found for this or any previous visit (from the past 24 hour(s)).        I spent 25 minutes in the professional and overall care of this patient.      Ze Patrick MD

## 2024-09-16 NOTE — LETTER
September 16, 2024     Antonio Perera DO  [address]  [address]  [facility] [address]    Patient: SAIMA Dominique   YOB: 1970   Date of Visit: 9/16/2024       Dear Dr. Antonio Perera DO:    Thank you for referring CASSTEEJulita Catina to me for evaluation. Below are my notes for this consultation.  If you have questions, please do not hesitate to call me. I look forward to following your patient along with you.       Sincerely,     Ze Patrick MD      CC: No Recipients  ______________________________________________________________________________________    Assessment/Plan  Excellent outcome now 2 months out from robotic assisted preperitoneal repair of an umbilical hernia.  He is cleared to resume all activities without specific limitations.  Follow-up with me as needed    Subjective  2-month follow-up after umbilical hernia repair.  Doing well.  No pain or swelling       Objective    Physical Exam  NAD  A&Ox3  Non icteric  CTA  RR  Abdomen soft min tender. Wounds clean, intact.  Good repair.  No clinical evidence of recurrence  Extremities warm, well perfused         Relevant Results      No results found for this or any previous visit (from the past 24 hour(s)).        I spent 25 minutes in the professional and overall care of this patient.      Ze Patrick MD

## 2024-09-17 ENCOUNTER — PHARMACY VISIT (OUTPATIENT)
Dept: PHARMACY | Facility: CLINIC | Age: 54
End: 2024-09-17
Payer: COMMERCIAL

## 2024-09-29 NOTE — PROGRESS NOTES
[facility] [place]   Hypertrophic Cardiomyopathy Center    Primary Care Physician: Antonio Perera DO  Primary Cardiologist:  Dr. Kaiden Miles  Date of Visit: 2024  2:40 PM EDT     HPI / Summary:   Blake Dominique is a 54 y.o. male with obstructive HCM who presents for follow up of HCM.     Since the last visit he started mavacamten. TTE 9/3 showed LF 65-70% with LVOT gradient at rest 8 mmHg and 10 mmHg provoked. TTE on 2024: LVF ~65%, basal septal hypertrophy of 1.6 cm, resting LVOT gradient 7 mmHg, 14 mmHg with valsalva, mild MR, there is mild MIGUEL without septal contact. He had a CPET done with results documented below. He has felt much better on the mavacamten. Much easier to bend down and pick things up without getting lightheaded and can go up stairs more easily without being winded.     Recently for exercise he has been doing the elliptical and treadmill. HR can get up to the 120s.     HCM History  The patient was initially diagnosed in  when an echocardiogram (which was done for a mumur) was suggestive of hypertrophic cardiomyopathy. As result he had a cardiac MRI which confirmed the diagnosis.   Family History of HCM: No  NYHA Class: II  Wall thickness: 1.6  EF: 65 to 70%  LVOT obstruction: Yes  ICD or PPM: None  Prior septal reduction therapy: None  Genetic Testing: Pending  Parents:   Mother -  at age 82, dementia, fell and broke her hip  Father - vasculopath, had a stroke, MI, fem pop, AAA, heavy smoker  Children: None  Siblings:   Has 3 older brothers and an older sister - no known HCM  None have been screened    Sudden Cardiac Arrest Risk Factors:   Syncope: No  Wall thickness above 30 mm: No  Apical aneurysm: None  NSVT by ambulatory monitor: No  EF less than 50%: No  LGE by CMR: Minimal   Family history of SCA in a family member with HCM: No  SCA risk: Likely low    ROS: Relevant review of symptoms of negative unless discussed above.      Problems:   Patient Active Problem List   Diagnosis    Abnormal EKG    Anxiety and depression    Chest heaviness    Dysphagia, pharyngoesophageal phase    Eosinophilic esophagitis    Food impaction of esophagus    Gilbert's syndrome    Heart murmur on physical examination    Heart murmur, systolic    Hiatal hernia    Insomnia    HLD (hyperlipidemia)    Hypertension, essential, benign    Hypokalemia    Hearing loss of both ears    Class 1 obesity due to excess calories with serious comorbidity and body mass index (BMI) of 30.0 to 30.9 in adult    Vitamin D deficiency    Left ventricular outflow tract obstruction (HHS-HCC)    Asymmetric septal hypertrophy (Multi)    Umbilical hernia without obstruction and without gangrene    Dental injury    Cardiomyopathy, hypertrophic (Multi)       Medical History:   Past Medical History:   Diagnosis Date    Anxiety     Depression     Gilbert's syndrome     2.3.24: bili, total: 1.7    Hyperlipidemia     Hypertension     Hypertrophic obstructive cardiomyopathy (HOCM) (Multi)     cMRI 5/30:1. Hypertrophic obstructive cardiomyopathy. Basal anterior septum 1.6 cm. No evidence of left ventricular apical aneurysm. 2. Normal left ventricular cavity size with hyperdynamic systolic function. Ejection fraction 72%. 3. Asymmetric septal left ventricular hypertrophy. Basal anterior septum 1.6 cm. LV mass index 101 g/m2 (normal 57-91).    Left ventricular outflow tract obstruction (HHS-HCC)     severe - prompted cMRI which shwed hypertrophic obstructive cardiomyopathy    Pre-operative clearance     cardiac clearance requested    Umbilical hernia     Vitamin D deficiency        Surgical Hx:   Past Surgical History:   Procedure Laterality Date    COLONOSCOPY      HERNIA REPAIR Left     inguinal hernia as a child    TONSILLECTOMY      UMBILICAL HERNIA REPAIR  07/11/2024    UPPER GASTROINTESTINAL ENDOSCOPY          Family Hx:   Family History   Problem Relation Name Age of Onset    Dementia  "Mother      Other (Cardiac disorder) Father      Stroke Father      Colon cancer Other cousin         Social Hx:  Fun: travel, walks his dog, [employer], gym ([employer])  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  EtOH/Smoking/Drugs: no smoking, rare alcohol, no drugs, supplement    Medications  Current Outpatient Medications   Medication Instructions    amLODIPine (Norvasc) 10 mg tablet TAKE 1 TABLET BY MOUTH EVERY DAY    ascorbic acid (Vitamin C) 500 mg chewable tablet GNP Vitamin C 500 MG Oral Tablet   Refills: 0        Start : 2-Nov-2020   Active    atorvastatin (LIPITOR) 20 mg, oral, Daily    Camzyos 5 mg, oral, Daily    candesartan (ATACAND) 16 mg, oral, Daily    cetirizine (ZYRTEC) 10 mg, oral, Daily    emtricitabine-tenofovir, TDF, (Truvada) 200-300 mg tablet 1 tablet, oral, Daily    escitalopram (LEXAPRO) 20 mg, oral, Daily    metoprolol tartrate (LOPRESSOR) 25 mg, oral, 2 times daily    multivitamin tablet 1 tablet, oral, Daily    pantoprazole (PROTONIX) 40 mg, oral, Daily, Do not crush, chew, or split.    tadalafil (Cialis) 10 mg tablet Take 1 tablet (10 mg) by mouth once daily as needed.       Allergies  Lisinopril    Exam:   Vitals: /83 (BP Location: Left arm, Patient Position: Sitting, BP Cuff Size: Adult)   Pulse 64   Ht 1.905 m (6' 3\")   Wt 114 kg (252 lb 1.6 oz)   SpO2 94%   BMI 31.51 kg/m²   Wt Readings from Last 5 Encounters:   09/30/24 114 kg (252 lb 1.6 oz)   09/30/24 115 kg (254 lb)   09/16/24 112 kg (246 lb)   09/03/24 108 kg (238 lb 1.6 oz)   08/01/24 108 kg (238 lb 4 oz)     GEN: Pleasant, well-appearing, no acute distress.  HEENT: JVP not elevated  CHEST: Clear to auscultation, No wheeze, good air movement.  CV: normal rate, regular rhythm, no systolic murmur at rest, with valsalva or with squat to stand.   EXT: Warm, well perfused, No LE edema.   NEURO: grossly non focal  SKIN: No obvious rashes     Labs:   Lipids  Lab Results   Component Value Date    CHOL 132 02/03/2024    CHOL 141 01/28/2023 " "   CHOL 149 09/03/2022     Lab Results   Component Value Date    HDL 31.0 02/03/2024    HDL 37.9 (A) 01/28/2023    HDL 37.7 (A) 09/03/2022     Lab Results   Component Value Date    LDLCALC 70 02/03/2024     Lab Results   Component Value Date    TRIG 157 (H) 02/03/2024    TRIG 203 (H) 01/28/2023    TRIG 187 (H) 09/03/2022     No components found for: \"CHOLHDL\"    Hemoglobin A1C  Lab Results   Component Value Date    HGBA1C 4.9 02/03/2024       BMP  Lab Results   Component Value Date    GLUCOSE 100 (H) 08/01/2024    CALCIUM 9.5 08/01/2024     08/01/2024    K 3.7 08/01/2024    CO2 24 08/01/2024     08/01/2024    BUN 17 08/01/2024    CREATININE 0.77 08/01/2024         Notable Studies: imaging personally reviewed and summarized by me below  EKG:  -3/22/2024: NSR, LVH with TWI in I and avL, no other ST changes  -8/2/2024: NSR, LVH, Q in III and avF    Echo:  -4/18/2024: LVEF 70%, asymmetric septal hypertrophy, resting LVOT gradient is 72 mmHg, moderate MAC with obstructive septal contact, max IVS 1.6 cm  -(week 4 mavacamten) 9/3/2024: TTE 9/3 showed LF 65-70% with LVOT gradient at rest 8 mmHg and 10 mmHg provoked.   -(week 8 mavacamten) 9/30/2024: LVF ~65%, basal septal hypertrophy of 1.6 cm, resting LVOT gradient 7 mmHg, 14 mmHg with valsalva, mild MR, there is mild MIGUEL without septal contact.     Ambulatory Rhythm Monitor:   -7/5/24-7/12/24: HR  bpm, average HR 76 bpm, <1% PVCs, <1% PACs, 4 episodes of SVT with fastest 142 bpm, longest being 5 beats.     Cardiac MRI:  -5/30/2024: FABIENNE up to 1.6 cm, with MIGUEL suggestive of LVOT obstruction, no significant LGE, LVEF 72%, thickened redundant mitral valve leaflets contributing to MIGUEL, moderate MR    Stress Test:  -CPET 9/3/2024: resting HR 75 bpm, peak  bpm, 99% MPHR, resting /68, peak /70, peak VO2 29 ml/kg/min (83% achieved), O2 pulse increased from 6 to 19, VE/VECO2 32, RER 1.33, Breathing reserve 23%, SaO2 99%. No arrhythmias. " "Ventilatory threshold occurred at 27 (94% of peak VO2) corresponding to 124 bpm.     Assessment and Plan  Blake Dominique is a 54 y.o. male with obstructive HCM who presents for HCM follow up.     #Symptoms: Greatly improved on mavacamten 5 mg. TTE on  showed LVOT gradient to be 14 mmHg with provocation. No provocable murmur at rest, with valsalva or with squat to stand on . Continue echo schedule per FDA protocol with next being in about 30 days.   -will change metoprolol tartrate 25 mg BID to metoprolol succinate 25 mg daily as he thinks he has felt slightly mores sluggish recently and his gradient is much improved.     #Family Screening: Both of his parents are , not from hypertrophic cardiomyopathy.  He has 4 older siblings. One has an aortic aneurysm and has had echos before. Others haven't been definitively screened. We will have him do genetic testing on 24 to identify a potential pathogenic mutation and have encouraged him to tell his siblings to have an echocardiogram and ECG as well.    #SCA risk: He does not have any high risk features for sudden cardiac death from hypertrophic cardiopathy.  We discussed there is no such thing as zero risk, but he is low risk.    #Exercise Recommendations:   There is debate regarding exercising with hypertrophic cardiomyopathy due to its association with sudden death, in both athletes and non athletes. For that reason, it was historically advised that people with HCM not exercise. Recent data suggest that the risk of sudden death with exercise is lower than previously thought, though not \"no risk\". There is heterogeneity among the presentations of HCM and for this reason, the guidelines note that the exact risk for each person for each activity is unknown.  Guidelines recommend risk stratification by an expert in HCM and shared decision making with patients regarding the goals of exercise and the risks and benefits of exercise. It is important to " "note that sudden cardiac arrest/death may happen even in individuals with no classic risk factors. Recent data from the RESET-HCM trial suggest that moderate intensity interval training is safe and effective in individuals with HCM. The LIVE-HCM trial, which was published in , suggests that vigorous exercise is not associated with a higher rate of adverse events than low or moderate intensity exercise. The European guidelines on exercising with HCM (2020) and the ACC/AHA HCM guidelines (2020) have evolved. Mild-moderate intensity exercise is now considered beneficial for most people and recommended for at least 150 mins per week (divided over 5 sessions) for general health improvement purposes. Certain individuals, after risk stratification can participate in intense physical activity and competition. For those who have been sedentary, gradual increase in the length and intensity of exercise is recommended. A 5 minute warm up and cool down period are always recommended as is maintaining adequate hydration, avoiding extreme conditions (extreme cold or heat), continuing routine medication adherence, and not exercising while ill. As a safety precaution, competitive and/or vigorous exercise should be done with other individuals in the area and preferably in a location where an external defibrillator is accessible.     We discussed the following recommendations:   For weight trainin-8 repetitions and 3 sets. The weight should be low enough that the first repetition feels similarly hard to the last repetition. Avoid trying to \"max out\" the weight.   On CPET his resting HR was 75 with peak  bpm. His anaerobic threshold occurred around 125 bpm. As such moderate intensity exercise (per D'Irvini et al. [employer]  and ESC sports guidelines 2020) is 40-69% of pVO2 (VO2 11-19 ml/kg/min corresponding to HR of ) and high intensity exercise for him is between 105-125 bpm (125 bpm is the anaerobic threshold). " "Will stay below anaerobic threshold for most of the workout for maximum benefit and theoretical lower risk but ok to increase above it for short periods of HIT training.     He will continue to follow with Dr. Miles as his primary cardiologist and with me for HCM related care.     Will arrange for visit in 4 months, at the time of his first \"3 month interval\" echo for mavacamten near the end of January.     Kwabena Reyes MD  Director, Sports Cardiology  [facility]    Part of this note was completed using dictation and voice recognition software. Please excuse minor errors and typos.      Time Spent: I spent 33 minutes reviewing medical testing, obtaining medical history and counselling and educating on diagnosis and documenting clinical encounter.   "

## 2024-09-30 ENCOUNTER — HOSPITAL ENCOUNTER (OUTPATIENT)
Dept: CARDIOLOGY | Facility: CLINIC | Age: 54
Discharge: HOME | End: 2024-09-30
Payer: COMMERCIAL

## 2024-09-30 ENCOUNTER — OFFICE VISIT (OUTPATIENT)
Dept: CARDIOLOGY | Facility: HOSPITAL | Age: 54
End: 2024-09-30
Payer: COMMERCIAL

## 2024-09-30 ENCOUNTER — TELEPHONE (OUTPATIENT)
Dept: CARDIOLOGY | Facility: HOSPITAL | Age: 54
End: 2024-09-30

## 2024-09-30 ENCOUNTER — OFFICE VISIT (OUTPATIENT)
Dept: GASTROENTEROLOGY | Facility: CLINIC | Age: 54
End: 2024-09-30
Payer: COMMERCIAL

## 2024-09-30 VITALS
SYSTOLIC BLOOD PRESSURE: 128 MMHG | HEIGHT: 75 IN | DIASTOLIC BLOOD PRESSURE: 83 MMHG | BODY MASS INDEX: 31.35 KG/M2 | OXYGEN SATURATION: 94 % | WEIGHT: 252.1 LBS | HEART RATE: 64 BPM

## 2024-09-30 VITALS
WEIGHT: 254 LBS | SYSTOLIC BLOOD PRESSURE: 128 MMHG | DIASTOLIC BLOOD PRESSURE: 84 MMHG | TEMPERATURE: 98.7 F | BODY MASS INDEX: 31.58 KG/M2 | HEIGHT: 75 IN | HEART RATE: 59 BPM

## 2024-09-30 DIAGNOSIS — K20.0 EOSINOPHILIC ESOPHAGITIS: Primary | ICD-10-CM

## 2024-09-30 DIAGNOSIS — I42.1 HOCM (HYPERTROPHIC OBSTRUCTIVE CARDIOMYOPATHY) (MULTI): Primary | ICD-10-CM

## 2024-09-30 DIAGNOSIS — I42.1 HOCM (HYPERTROPHIC OBSTRUCTIVE CARDIOMYOPATHY) (MULTI): ICD-10-CM

## 2024-09-30 LAB
AORTIC VALVE PEAK VELOCITY: 1.31 M/S
AV PEAK GRADIENT: 6.8 MMHG
AVA (PEAK VEL): 5.03 CM2
EJECTION FRACTION APICAL 4 CHAMBER: 63.2
EJECTION FRACTION: 65 %
LEFT ATRIUM VOLUME AREA LENGTH INDEX BSA: 26.6 ML/M2
LEFT VENTRICLE INTERNAL DIMENSION DIASTOLE: 4.5 CM (ref 3.5–6)
LEFT VENTRICULAR OUTFLOW TRACT DIAMETER: 2.81 CM
MITRAL VALVE E/A RATIO: 0.88
RIGHT VENTRICLE FREE WALL PEAK S': 13 CM/S
RIGHT VENTRICLE PEAK SYSTOLIC PRESSURE: 29.8 MMHG
TRICUSPID ANNULAR PLANE SYSTOLIC EXCURSION: 2.3 CM

## 2024-09-30 PROCEDURE — 99214 OFFICE O/P EST MOD 30 MIN: CPT | Performed by: INTERNAL MEDICINE

## 2024-09-30 PROCEDURE — 3079F DIAST BP 80-89 MM HG: CPT | Performed by: NURSE PRACTITIONER

## 2024-09-30 PROCEDURE — 3074F SYST BP LT 130 MM HG: CPT | Performed by: INTERNAL MEDICINE

## 2024-09-30 PROCEDURE — 93308 TTE F-UP OR LMTD: CPT | Performed by: INTERNAL MEDICINE

## 2024-09-30 PROCEDURE — 1036F TOBACCO NON-USER: CPT | Performed by: INTERNAL MEDICINE

## 2024-09-30 PROCEDURE — 3008F BODY MASS INDEX DOCD: CPT | Performed by: NURSE PRACTITIONER

## 2024-09-30 PROCEDURE — 93321 DOPPLER ECHO F-UP/LMTD STD: CPT | Performed by: INTERNAL MEDICINE

## 2024-09-30 PROCEDURE — 99203 OFFICE O/P NEW LOW 30 MIN: CPT | Performed by: NURSE PRACTITIONER

## 2024-09-30 PROCEDURE — 93325 DOPPLER ECHO COLOR FLOW MAPG: CPT

## 2024-09-30 PROCEDURE — RXMED WILLOW AMBULATORY MEDICATION CHARGE

## 2024-09-30 PROCEDURE — 1036F TOBACCO NON-USER: CPT | Performed by: NURSE PRACTITIONER

## 2024-09-30 PROCEDURE — 99213 OFFICE O/P EST LOW 20 MIN: CPT | Performed by: NURSE PRACTITIONER

## 2024-09-30 PROCEDURE — 3074F SYST BP LT 130 MM HG: CPT | Performed by: NURSE PRACTITIONER

## 2024-09-30 PROCEDURE — 3079F DIAST BP 80-89 MM HG: CPT | Performed by: INTERNAL MEDICINE

## 2024-09-30 PROCEDURE — 93325 DOPPLER ECHO COLOR FLOW MAPG: CPT | Performed by: INTERNAL MEDICINE

## 2024-09-30 PROCEDURE — 3008F BODY MASS INDEX DOCD: CPT | Performed by: INTERNAL MEDICINE

## 2024-09-30 RX ORDER — PANTOPRAZOLE SODIUM 40 MG/1
40 TABLET, DELAYED RELEASE ORAL DAILY
Qty: 30 TABLET | Refills: 11 | Status: SHIPPED | OUTPATIENT
Start: 2024-09-30 | End: 2025-09-30

## 2024-09-30 RX ORDER — PANTOPRAZOLE SODIUM 40 MG/1
40 TABLET, DELAYED RELEASE ORAL DAILY
Qty: 30 TABLET | Refills: 11 | Status: SHIPPED | OUTPATIENT
Start: 2024-09-30 | End: 2024-09-30

## 2024-09-30 RX ORDER — METOPROLOL SUCCINATE 25 MG/1
25 TABLET, EXTENDED RELEASE ORAL DAILY
Qty: 90 TABLET | Refills: 3 | Status: SHIPPED | OUTPATIENT
Start: 2024-09-30 | End: 2025-09-30

## 2024-09-30 ASSESSMENT — ENCOUNTER SYMPTOMS
RESPIRATORY NEGATIVE: 1
ROS GI COMMENTS: SEE HPI
CHILLS: 0
HEMATOLOGIC/LYMPHATIC NEGATIVE: 1
FATIGUE: 0
STRIDOR: 0
PSYCHIATRIC NEGATIVE: 1
CHEST TIGHTNESS: 0
CARDIOVASCULAR NEGATIVE: 1
WHEEZING: 0
MUSCULOSKELETAL NEGATIVE: 1
DIAPHORESIS: 0
COUGH: 0
DIFFICULTY URINATING: 0
ENDOCRINE NEGATIVE: 1
FEVER: 0
NEUROLOGICAL NEGATIVE: 1
APNEA: 0
ALLERGIC/IMMUNOLOGIC NEGATIVE: 1
EYES NEGATIVE: 1
SHORTNESS OF BREATH: 0

## 2024-09-30 NOTE — PATIENT INSTRUCTIONS
"There is debate regarding exercising with hypertrophic cardiomyopathy due to its association with sudden death, in both athletes and non athletes. For that reason, it was historically advised that people with HCM not exercise. Recent data suggest that the risk of sudden death with exercise is lower than previously thought, though not \"no risk\". There is heterogeneity among the presentations of HCM and for this reason, the guidelines note that the exact risk for each person for each activity is unknown.  Guidelines recommend risk stratification by an expert in HCM and shared decision making with patients regarding the goals of exercise and the risks and benefits of exercise. It is important to note that sudden cardiac arrest/death may happen even in individuals with no classic risk factors. Recent data from the RESET-HCM trial suggest that moderate intensity interval training is safe and effective in individuals with HCM. The LIVE-HCM trial, which was published in 2023, suggests that vigorous exercise is not associated with a higher rate of adverse events than low or moderate intensity exercise. The European guidelines on exercising with HCM (2020) and the ACC/AHA HCM guidelines (2020) have evolved. Mild-moderate intensity exercise is now considered beneficial for most people and recommended for at least 150 mins per week (divided over 5 sessions) for general health improvement purposes. Certain individuals, after risk stratification can participate in intense physical activity and competition. For those who have been sedentary, gradual increase in the length and intensity of exercise is recommended. A 5 minute warm up and cool down period are always recommended as is maintaining adequate hydration, avoiding extreme conditions (extreme cold or heat), continuing routine medication adherence, and not exercising while ill. As a safety precaution, competitive and/or vigorous exercise should be done with other individuals " "in the area and preferably in a location where an external defibrillator is accessible.     Moderate intensity exercise is 40-69% of pVO2 (VO2 11-19 ml/kg/min corresponding to HR of )  High intensity between 105-125 bpm (125 bpm is the anaerobic threshold)    For weight trainin-8 repetitions and 3 sets. The weight should be low enough that the first repetition feels similarly hard to the last repetition. Avoid trying to \"max out\" the weight.     Hydrate as much as possible.     Visit in 4 months.   Change metoprolol tartate 25 mg BID to metoprolol succinate 25 mg daily.   "

## 2024-09-30 NOTE — Clinical Note
"Evan Richey - LENY is doing really well on mavacamten. We went over specific exercise recommendations today in light of his HCM and recent CPET.  Kenzie - can you arrange for visit in 4 months, at the time of his first \"3 month interval\" echo for mavacamten near the end of January. "

## 2024-09-30 NOTE — TELEPHONE ENCOUNTER
Left message for patient that mavacamten monitoring echo week 8 looks good & he should continue with 5mg dosing.  Next monitoring echo is 10/28/24  Patient has an OV with Dr. Reyes this afternoon & he will review further with him.  Patient encouraged to call HCM Center for any questions or concerns.    no weight-bearing restrictions

## 2024-09-30 NOTE — PROGRESS NOTES
"Subjective   Patient ID: Blake Fowlers \"SAIMA\" is a 54 y.o. male who presents for GI Problem. PMH: hypertrophic obstruction cardiomyopathy, Gilbert's and hernia repair     Was taking off PPI d/t hypertrophic  Now having dysphagia, mostly with bread     EGD showed 50 eosinophils per HPF      EGD (2021)  Colonoscopy (2021)--polyps was normal mucosa      Family Hx: Cousin with colon cancer @ 52    Review of Systems   Constitutional:  Negative for chills, diaphoresis, fatigue and fever.   HENT: Negative.     Eyes: Negative.    Respiratory: Negative.  Negative for apnea, cough, chest tightness, shortness of breath, wheezing and stridor.    Cardiovascular: Negative.    Gastrointestinal:         See HPI    Endocrine: Negative.    Genitourinary: Negative.  Negative for difficulty urinating.   Musculoskeletal: Negative.    Skin: Negative.    Allergic/Immunologic: Negative.    Neurological: Negative.    Hematological: Negative.    Psychiatric/Behavioral: Negative.         Objective   Physical Exam  Constitutional:       Appearance: He is normal weight.   Psychiatric:         Mood and Affect: Mood normal.         Assessment/Plan   Diagnoses and all orders for this visit:  Eosinophilic esophagitis  -     pantoprazole (Protonix) 40 mg EC tablet; Take 1 tablet (40 mg) by mouth once daily. Do not crush, chew, or split.     1. EOE-was doing well on Omeprazole however recently started on Mavacamten which is contraindicated with Omeprazole. He is now having some intermittent dysphagia, will start pantoprazole. We discussed dupixent if he continues to have symptoms despite PPI    F/up in 1-2 months       NICK Valenzuela-CNP 09/30/24 11:02 AM   "

## 2024-10-01 ENCOUNTER — PHARMACY VISIT (OUTPATIENT)
Dept: PHARMACY | Facility: CLINIC | Age: 54
End: 2024-10-01
Payer: COMMERCIAL

## 2024-10-15 ENCOUNTER — SPECIALTY PHARMACY (OUTPATIENT)
Dept: PHARMACY | Facility: CLINIC | Age: 54
End: 2024-10-15

## 2024-10-15 PROCEDURE — RXMED WILLOW AMBULATORY MEDICATION CHARGE

## 2024-10-16 ENCOUNTER — PHARMACY VISIT (OUTPATIENT)
Dept: PHARMACY | Facility: CLINIC | Age: 54
End: 2024-10-16
Payer: COMMERCIAL

## 2024-10-24 PROCEDURE — RXMED WILLOW AMBULATORY MEDICATION CHARGE

## 2024-10-28 ENCOUNTER — APPOINTMENT (OUTPATIENT)
Dept: CARDIOLOGY | Facility: CLINIC | Age: 54
End: 2024-10-28
Payer: COMMERCIAL

## 2024-10-28 ENCOUNTER — PHARMACY VISIT (OUTPATIENT)
Dept: PHARMACY | Facility: CLINIC | Age: 54
End: 2024-10-28
Payer: COMMERCIAL

## 2024-10-28 ENCOUNTER — LAB (OUTPATIENT)
Dept: LAB | Facility: LAB | Age: 54
End: 2024-10-28
Payer: COMMERCIAL

## 2024-10-28 DIAGNOSIS — Z77.21 PERSONAL HISTORY OF EXPOSURE TO POTENTIALLY HAZARDOUS BODY FLUIDS: ICD-10-CM

## 2024-10-28 LAB
ALBUMIN SERPL BCP-MCNC: 4.5 G/DL (ref 3.4–5)
ANION GAP SERPL CALC-SCNC: 14 MMOL/L (ref 10–20)
BUN SERPL-MCNC: 15 MG/DL (ref 6–23)
C TRACH RRNA SPEC QL NAA+PROBE: NEGATIVE
CALCIUM SERPL-MCNC: 9.4 MG/DL (ref 8.6–10.6)
CHLORIDE SERPL-SCNC: 101 MMOL/L (ref 98–107)
CO2 SERPL-SCNC: 28 MMOL/L (ref 21–32)
CREAT SERPL-MCNC: 0.98 MG/DL (ref 0.5–1.3)
EGFRCR SERPLBLD CKD-EPI 2021: >90 ML/MIN/1.73M*2
GLUCOSE SERPL-MCNC: 99 MG/DL (ref 74–99)
HIV 1+2 AB+HIV1 P24 AG SERPL QL IA: NONREACTIVE
N GONORRHOEA DNA SPEC QL PROBE+SIG AMP: NEGATIVE
PHOSPHATE SERPL-MCNC: 3.8 MG/DL (ref 2.5–4.9)
POTASSIUM SERPL-SCNC: 4.6 MMOL/L (ref 3.5–5.3)
SODIUM SERPL-SCNC: 138 MMOL/L (ref 136–145)
TREPONEMA PALLIDUM IGG+IGM AB [PRESENCE] IN SERUM OR PLASMA BY IMMUNOASSAY: NONREACTIVE

## 2024-10-28 PROCEDURE — 86780 TREPONEMA PALLIDUM: CPT

## 2024-10-28 PROCEDURE — 87389 HIV-1 AG W/HIV-1&-2 AB AG IA: CPT

## 2024-10-28 PROCEDURE — 80069 RENAL FUNCTION PANEL: CPT

## 2024-10-28 PROCEDURE — 36415 COLL VENOUS BLD VENIPUNCTURE: CPT

## 2024-10-28 PROCEDURE — 87591 N.GONORRHOEAE DNA AMP PROB: CPT

## 2024-10-28 PROCEDURE — 87491 CHLMYD TRACH DNA AMP PROBE: CPT

## 2024-10-29 ENCOUNTER — APPOINTMENT (OUTPATIENT)
Dept: IMMUNOLOGY | Facility: CLINIC | Age: 54
End: 2024-10-29
Payer: COMMERCIAL

## 2024-10-29 ENCOUNTER — OFFICE VISIT (OUTPATIENT)
Dept: IMMUNOLOGY | Facility: CLINIC | Age: 54
End: 2024-10-29
Payer: COMMERCIAL

## 2024-10-29 VITALS
HEIGHT: 75 IN | BODY MASS INDEX: 30.04 KG/M2 | WEIGHT: 241.6 LBS | RESPIRATION RATE: 16 BRPM | TEMPERATURE: 97.3 F | DIASTOLIC BLOOD PRESSURE: 88 MMHG | OXYGEN SATURATION: 97 % | SYSTOLIC BLOOD PRESSURE: 142 MMHG | HEART RATE: 73 BPM

## 2024-10-29 DIAGNOSIS — Z77.21 EXPOSURE TO POTENTIALLY HAZARDOUS BODY FLUIDS: Primary | ICD-10-CM

## 2024-10-29 PROCEDURE — 3008F BODY MASS INDEX DOCD: CPT | Performed by: NURSE PRACTITIONER

## 2024-10-29 PROCEDURE — 99214 OFFICE O/P EST MOD 30 MIN: CPT | Performed by: NURSE PRACTITIONER

## 2024-10-29 PROCEDURE — 3079F DIAST BP 80-89 MM HG: CPT | Performed by: NURSE PRACTITIONER

## 2024-10-29 PROCEDURE — 3077F SYST BP >= 140 MM HG: CPT | Performed by: NURSE PRACTITIONER

## 2024-10-29 ASSESSMENT — PAIN SCALES - GENERAL: PAINLEVEL_OUTOF10: 0-NO PAIN

## 2024-10-30 ENCOUNTER — HOSPITAL ENCOUNTER (OUTPATIENT)
Dept: CARDIOLOGY | Facility: HOSPITAL | Age: 54
Discharge: HOME | End: 2024-10-30
Payer: COMMERCIAL

## 2024-10-30 DIAGNOSIS — I42.1 HOCM (HYPERTROPHIC OBSTRUCTIVE CARDIOMYOPATHY) (MULTI): Primary | ICD-10-CM

## 2024-10-30 DIAGNOSIS — I42.1 HOCM (HYPERTROPHIC OBSTRUCTIVE CARDIOMYOPATHY) (MULTI): ICD-10-CM

## 2024-10-30 DIAGNOSIS — Z77.21 PERSONAL HISTORY OF EXPOSURE TO POTENTIALLY HAZARDOUS BODY FLUIDS: ICD-10-CM

## 2024-10-30 LAB
AORTIC VALVE MEAN GRADIENT: 4 MMHG
AORTIC VALVE PEAK VELOCITY: 1.22 M/S
AV PEAK GRADIENT: 6 MMHG
EJECTION FRACTION APICAL 4 CHAMBER: 55.6
EJECTION FRACTION: 59 %
LEFT VENTRICLE INTERNAL DIMENSION DIASTOLE: 4.87 CM (ref 3.5–6)
MITRAL VALVE E/A RATIO: 0.8
RIGHT VENTRICLE PEAK SYSTOLIC PRESSURE: 19.8 MMHG

## 2024-10-30 PROCEDURE — 93308 TTE F-UP OR LMTD: CPT | Performed by: STUDENT IN AN ORGANIZED HEALTH CARE EDUCATION/TRAINING PROGRAM

## 2024-10-30 PROCEDURE — 93308 TTE F-UP OR LMTD: CPT

## 2024-10-30 RX ORDER — DOXYCYCLINE 100 MG/1
200 CAPSULE ORAL AS NEEDED
Qty: 30 CAPSULE | Refills: 0 | Status: SHIPPED | OUTPATIENT
Start: 2024-10-30

## 2024-10-30 RX ORDER — EMTRICITABINE AND TENOFOVIR DISOPROXIL FUMARATE 200; 300 MG/1; MG/1
1 TABLET, FILM COATED ORAL DAILY
Qty: 30 TABLET | Refills: 2 | Status: SHIPPED | OUTPATIENT
Start: 2024-10-30

## 2024-10-30 ASSESSMENT — ENCOUNTER SYMPTOMS
MUSCULOSKELETAL NEGATIVE: 1
NEUROLOGICAL NEGATIVE: 1
EYES NEGATIVE: 1
PSYCHIATRIC NEGATIVE: 1
CONSTITUTIONAL NEGATIVE: 1
ENDOCRINE NEGATIVE: 1
ALLERGIC/IMMUNOLOGIC NEGATIVE: 1
RESPIRATORY NEGATIVE: 1
GASTROINTESTINAL NEGATIVE: 1
HEMATOLOGIC/LYMPHATIC NEGATIVE: 1

## 2024-10-31 ENCOUNTER — SPECIALTY PHARMACY (OUTPATIENT)
Dept: PHARMACY | Facility: CLINIC | Age: 54
End: 2024-10-31

## 2024-11-04 PROCEDURE — RXMED WILLOW AMBULATORY MEDICATION CHARGE

## 2024-11-05 PROCEDURE — RXMED WILLOW AMBULATORY MEDICATION CHARGE

## 2024-11-12 VITALS — SYSTOLIC BLOOD PRESSURE: 125 MMHG | DIASTOLIC BLOOD PRESSURE: 80 MMHG

## 2024-11-12 NOTE — PROGRESS NOTES
Pt called.  He checks his blood pressure at home.  His reading on 11/9/24 was 125/80.  This bp was documented in his chart today.

## 2024-11-14 ENCOUNTER — LAB (OUTPATIENT)
Dept: LAB | Facility: LAB | Age: 54
End: 2024-11-14
Payer: COMMERCIAL

## 2024-11-14 ENCOUNTER — CLINICAL SUPPORT (OUTPATIENT)
Dept: GENETICS | Facility: CLINIC | Age: 54
End: 2024-11-14
Payer: COMMERCIAL

## 2024-11-14 DIAGNOSIS — I42.1 HOCM (HYPERTROPHIC OBSTRUCTIVE CARDIOMYOPATHY) (MULTI): ICD-10-CM

## 2024-11-14 PROCEDURE — 96040 PR MEDICAL GENETICS COUNSELING EACH 30 MINUTES: CPT | Performed by: GENETIC COUNSELOR, MS

## 2024-11-14 PROCEDURE — 96040 HC GENETIC COUNSELING, EACH 30 MIN: CPT | Performed by: GENETIC COUNSELOR, MS

## 2024-11-14 NOTE — PROGRESS NOTES
"Subjective   Patient ID: Blake Dominique \"GALA" is a 54 y.o. male who presents for cardiac genetic counseling due to a recent diagnosis of hypertrophic cardiomyopathy. We reviewed the hereditary aspects of HCM, and the genetic testing options available to him.    Background Information: Approximately 4 years ago Blake ran up a flight of stairs and experienced dizziness and shortness of breath. He was reportedly seen by Cardiology and had a stress test and echo which were reportedly normal. Most recently, his PCP noted that his heart murmur sounded worse and ordered an echocardiogram. The echocardiogram was suspicious for HCM so a cardiac MRI was ordered which confirmed the diagnosis.    Family History:  A three generation pedigree was collected, and was concerning for the following:    -Father  of vascular disease at 72 (multiple strokes and MIs)  -Brother being monitored for aortic aneurysm at age 60    Blake Dominique \"GALA" is of Polish/[place]/English descent.  Consanguinity and Ashkenazi Confucianist ancestry was denied.    The rest of the family history was negative for intellectual disability, autism, birth defects, multiple miscarriages, early onset cancer or kidney concerns, and other hereditary conditions.    Assessment/Plan   Genetic Test Ordered: yes    Genetic Counseling:  Hypertrophic cardiomyopathy (HCM) is a condition in which the muscles of the left ventricle thicken, and is most commonly due to a mutation of one of the genes currently known to encode different components of the sarcomere and is inherited in an autosomal dominant manner. For individuals with a family history of HCM, a pathogenic mutation (known harmful gene change) in one of the sarcomere genes can be found in 50-60% of individuals. For individuals without a family history (simplex case), a pathogenic mutation can be found in around 20-30% of individuals.    The most common genes in which mutations are found in individuals with HCM " are MYH7, MYBPC3, TNNT2, and TNNI3, however, mutations in many other genes have been found in individuals with HCM. Some individuals with HCM will actually have more than one mutation (either 2 mutations in the same gene, or 2 mutations in different genes). Because of the genetic heterogeneity seen in HCM, a broad panel approach to genetic testing is recommended.    We reviewed options for testing, specifically discussing a hypertrophic cardiomyopathy panel through [facility] that would examine 30 genes associated with the condition. We also reviewed the various possible test results, including positive, negative, and variant of unknown significance (VUS).    After discussion, the patient elected to proceed with genetic testing. Results should be available in 3-4 weeks and will be called out to the patient. Should a likely pathogenic or pathogenic mutation be identified, we will be able to offer family members targeted testing.  If no mutation is identified, cardiac screening for all 1st-degree relatives will be recommended.    Vashti Llanes MS [facility] [place]  Licensed Genetic Counselor  [facility]  [phone]    Total time spent on day of encounter: 21 minutes

## 2024-11-15 ENCOUNTER — SPECIALTY PHARMACY (OUTPATIENT)
Dept: PHARMACY | Facility: CLINIC | Age: 54
End: 2024-11-15

## 2024-11-18 ENCOUNTER — PHARMACY VISIT (OUTPATIENT)
Dept: PHARMACY | Facility: CLINIC | Age: 54
End: 2024-11-18
Payer: COMMERCIAL

## 2024-11-21 DIAGNOSIS — I10 ESSENTIAL (PRIMARY) HYPERTENSION: ICD-10-CM

## 2024-11-21 RX ORDER — AMLODIPINE BESYLATE 10 MG/1
TABLET ORAL
Qty: 90 TABLET | Refills: 3 | Status: SHIPPED | OUTPATIENT
Start: 2024-11-21

## 2024-11-25 ENCOUNTER — PHARMACY VISIT (OUTPATIENT)
Dept: PHARMACY | Facility: CLINIC | Age: 54
End: 2024-11-25
Payer: COMMERCIAL

## 2024-11-25 PROCEDURE — RXMED WILLOW AMBULATORY MEDICATION CHARGE

## 2024-12-02 ENCOUNTER — DOCUMENTATION (OUTPATIENT)
Dept: GENETICS | Facility: CLINIC | Age: 54
End: 2024-12-02
Payer: COMMERCIAL

## 2024-12-02 LAB
COMMENTS - MP RESULT TYPE: NORMAL
SCAN RESULT: NORMAL

## 2024-12-02 NOTE — PROGRESS NOTES
"Blake Fowlers \"LENY\" is a 54 year old man with a diagnosis of hypertrophic cardiomyopathy. He recently underwent genetic testing to try and determine the cause of his HCM diagnosis. We spoke with him over the phone today regarding his genetic testing results which returned negative.  We reviewed these results with LENY, and discussed the implications for family members.        GENETIC COUNSELING  We reviewed with LENY that, at this time, we do not know why he developed HCM. We discussed that, even though his genetic test results did not find a genetic change, this does not mean that his condition is not hereditary.    At this time, since we did not find a genetic answer, we cannot make any medical management recommendations based on the results. We must use LENY's personal and family history to guide his care and the care of his relatives.    We discussed that since LENY's HCM could still be hereditary, typically autosomal dominant, 1st-degree relatives are at risk. We reviewed that this means that his 1st-degree relatives should still have regular, ongoing screenings with a cardiologist. Recommendations are 3-5 years for adults, and 1-3 years for children prior to adolescence. LENY plans to discuss the recommendation to initiate ongoing Cardiology evaluations for first degree relatives. We will send him a letter to his home to share with family members describing these recommendation.    We also asked that LENY keep us appraised as to any changes to his personal and/or family history of cardiac issues, and check back with us in 3-4 years to see if we have additional genetic testing to offer.  LENY should have electronic access to this document.  He was asked to call me with any questions following our discussion today.    Vashti Llanes MS, [facility] [place]  Genetic Counselor  [facility]  [phone]  "

## 2024-12-11 ENCOUNTER — SPECIALTY PHARMACY (OUTPATIENT)
Dept: PHARMACY | Facility: CLINIC | Age: 54
End: 2024-12-11

## 2024-12-11 PROCEDURE — RXMED WILLOW AMBULATORY MEDICATION CHARGE

## 2024-12-13 ENCOUNTER — PHARMACY VISIT (OUTPATIENT)
Dept: PHARMACY | Facility: CLINIC | Age: 54
End: 2024-12-13
Payer: COMMERCIAL

## 2024-12-19 ENCOUNTER — APPOINTMENT (OUTPATIENT)
Facility: CLINIC | Age: 54
End: 2024-12-19
Payer: COMMERCIAL

## 2024-12-19 VITALS
OXYGEN SATURATION: 95 % | BODY MASS INDEX: 30.75 KG/M2 | TEMPERATURE: 98.4 F | WEIGHT: 246 LBS | DIASTOLIC BLOOD PRESSURE: 86 MMHG | RESPIRATION RATE: 18 BRPM | HEART RATE: 65 BPM | SYSTOLIC BLOOD PRESSURE: 128 MMHG

## 2024-12-19 DIAGNOSIS — F32.A ANXIETY AND DEPRESSION: ICD-10-CM

## 2024-12-19 DIAGNOSIS — E55.9 VITAMIN D DEFICIENCY: ICD-10-CM

## 2024-12-19 DIAGNOSIS — E66.09 CLASS 1 OBESITY DUE TO EXCESS CALORIES WITH SERIOUS COMORBIDITY AND BODY MASS INDEX (BMI) OF 30.0 TO 30.9 IN ADULT: ICD-10-CM

## 2024-12-19 DIAGNOSIS — I42.2 CARDIOMYOPATHY, HYPERTROPHIC (MULTI): ICD-10-CM

## 2024-12-19 DIAGNOSIS — I10 HYPERTENSION, ESSENTIAL, BENIGN: Primary | ICD-10-CM

## 2024-12-19 DIAGNOSIS — E66.811 CLASS 1 OBESITY DUE TO EXCESS CALORIES WITH SERIOUS COMORBIDITY AND BODY MASS INDEX (BMI) OF 30.0 TO 30.9 IN ADULT: ICD-10-CM

## 2024-12-19 DIAGNOSIS — F41.9 ANXIETY AND DEPRESSION: ICD-10-CM

## 2024-12-19 DIAGNOSIS — E78.5 HYPERLIPIDEMIA, UNSPECIFIED HYPERLIPIDEMIA TYPE: ICD-10-CM

## 2024-12-19 DIAGNOSIS — Z12.5 SCREENING PSA (PROSTATE SPECIFIC ANTIGEN): ICD-10-CM

## 2024-12-19 PROCEDURE — 3074F SYST BP LT 130 MM HG: CPT | Performed by: FAMILY MEDICINE

## 2024-12-19 PROCEDURE — 99214 OFFICE O/P EST MOD 30 MIN: CPT | Performed by: FAMILY MEDICINE

## 2024-12-19 PROCEDURE — 3079F DIAST BP 80-89 MM HG: CPT | Performed by: FAMILY MEDICINE

## 2024-12-19 ASSESSMENT — ENCOUNTER SYMPTOMS
HYPERTENSION: 1
SHORTNESS OF BREATH: 0
HEADACHES: 0

## 2024-12-19 NOTE — PROGRESS NOTES
"Vanna CAMERON Masters \"E.J.\" is a 54 y.o. male who presents for Hypertension.    Hypertension  Associated symptoms include anxiety. Pertinent negatives include no chest pain, headaches or shortness of breath.      Pt is here for HTN follow up.  He is on candesartan, amlodipine, metoprolol succinate.  He is tolerating his medications well.  He checks at home - 120s/80s.  Patient denies chest pain, shortness of breath, dizziness, headaches    Pt was diagnosed with HCM in April 2024 when an echocardiogram (which was done for a mumur) was suggestive of hypertrophic cardiomyopathy.  He then had a cardiac MRI which confirmed the diagnosis.       Pt's primary cardiologist is Dr. Miles    Pt's cardiomyopathy cardiologist is Dr. Reyes.     He was started on mavacamten in August 2024.  He is doing well.     Hx of robotic assisted preperitoneal repair of an umbilical hernia.       Review of Systems   Respiratory:  Negative for shortness of breath.    Cardiovascular:  Negative for chest pain.   Neurological:  Negative for headaches.       Objective     Vitals:    12/19/24 1503 12/19/24 1527   BP: 135/87 128/86   BP Location: Left arm    Patient Position: Sitting    Pulse: 65    Resp: 18    Temp: 36.9 °C (98.4 °F)    TempSrc: Temporal    SpO2: 95%    Weight: 112 kg (246 lb)         Current Outpatient Medications   Medication Instructions    amLODIPine (Norvasc) 10 mg tablet TAKE 1 TABLET BY MOUTH EVERY DAY    ascorbic acid (Vitamin C) 500 mg chewable tablet GNP Vitamin C 500 MG Oral Tablet   Refills: 0        Start : 2-Nov-2020   Active    atorvastatin (LIPITOR) 20 mg, oral, Daily    Camzyos 5 mg, Daily    candesartan (ATACAND) 16 mg, oral, Daily    cetirizine (ZYRTEC) 10 mg, Daily    doxycycline (Vibramycin) 100 mg capsule Take 2 capsules (200 mg) by mouth if needed. Take 2 capsules one time within 72 hours of an unprotected sexual encounter. Take with at least 8 ounces (large glass) of water. Do not lie down for 30 " minutes after    emtricitabine-tenofovir, TDF, (Truvada) 200-300 mg tablet 1 tablet, oral, Daily    escitalopram (LEXAPRO) 20 mg, oral, Daily    metoprolol succinate XL (TOPROL-XL) 25 mg, oral, Daily, Do not crush or chew.    multivitamin tablet 1 tablet, Daily    pantoprazole (PROTONIX) 40 mg, oral, Daily, Do not crush, chew, or split.    tadalafil (Cialis) 10 mg tablet Take 1 tablet (10 mg) by mouth once daily as needed.        Allergies   Allergen Reactions    Lisinopril Cough        Past Surgical History:   Procedure Laterality Date    COLONOSCOPY      HERNIA REPAIR Left     inguinal hernia as a child    TONSILLECTOMY      UMBILICAL HERNIA REPAIR  07/11/2024    UPPER GASTROINTESTINAL ENDOSCOPY          Social History     Tobacco Use    Smoking status: Former     Types: Cigarettes    Smokeless tobacco: Never    Tobacco comments:     Quit 2019 - 1/2 PPD x 20 yrs    Vaping Use    Vaping status: Never Used   Substance Use Topics    Alcohol use: Yes     Alcohol/week: 5.0 standard drinks of alcohol     Types: 5 Standard drinks or equivalent per week     Comment: occasionally    Drug use: Never        Family History   Problem Relation Name Age of Onset    Dementia Mother      Other (Cardiac disorder) Father      Stroke Father      Colon cancer Other cousin         Immunization History   Administered Date(s) Administered    Hepatitis A vaccine, age 19 years and greater (HAVRIX) 10/01/2019    Influenza, Unspecified 09/11/2020    Influenza, seasonal, injectable 09/11/2020, 09/30/2023    Moderna COVID-19 vaccine, bivalent, blue cap/gray label *Check age/dose* 01/27/2023    Moderna SARS-CoV-2 Vaccination 01/04/2021, 02/01/2021, 11/03/2021    Novel influenza-H1N1-09, preservative-free 10/30/2009    Pneumococcal polysaccharide vaccine, 23-valent, age 2 years and older (PNEUMOVAX 23) 08/31/2011    Small pox and monkeypox vaccine (Jynneos) *check dose/route* 08/21/2022, 10/04/2022    Tdap vaccine, age 7 year and older  (BOOSTRIX, ADACEL) 01/15/2015, 02/01/2024    Zoster vaccine, recombinant, adult (SHINGRIX) 03/22/2022, 08/30/2022        Physical Exam  Vitals reviewed.   Constitutional:       General: He is not in acute distress.     Appearance: Normal appearance. He is well-developed. He is obese.   HENT:      Head: Normocephalic and atraumatic.   Eyes:      General: Lids are normal.      Conjunctiva/sclera:      Right eye: Right conjunctiva is not injected.      Left eye: Left conjunctiva is not injected.   Cardiovascular:      Rate and Rhythm: Normal rate and regular rhythm.      Heart sounds: Murmur heard.   Pulmonary:      Effort: Pulmonary effort is normal. No respiratory distress.      Breath sounds: Normal breath sounds. No wheezing, rhonchi or rales.   Skin:     General: Skin is warm and dry.      Findings: No rash.   Neurological:      Mental Status: He is alert and oriented to person, place, and time. Mental status is at baseline.   Psychiatric:         Mood and Affect: Mood normal.         Behavior: Behavior normal.         Assessment & Plan  Hypertension, essential, benign  Controlled   Orders:    CBC and Auto Differential; Future    Hyperlipidemia, unspecified hyperlipidemia type  On statin   Orders:    Comprehensive Metabolic Panel; Future    Lipid Panel; Future    Anxiety and depression  Controlled on lexapro       Vitamin D deficiency    Orders:    Vitamin D 25-Hydroxy,Total (for eval of Vitamin D levels); Future    Screening PSA (prostate specific antigen)    Orders:    Prostate Specific Antigen; Future    Class 1 obesity due to excess calories with serious comorbidity and body mass index (BMI) of 30.0 to 30.9 in adult    Orders:    Hemoglobin A1C; Future    Cardiomyopathy, hypertrophic (Multi)  Sees cardiac specialist.

## 2024-12-29 PROCEDURE — RXMED WILLOW AMBULATORY MEDICATION CHARGE

## 2024-12-30 ENCOUNTER — PHARMACY VISIT (OUTPATIENT)
Dept: PHARMACY | Facility: CLINIC | Age: 54
End: 2024-12-30
Payer: COMMERCIAL

## 2024-12-30 PROCEDURE — RXOTC WILLOW AMBULATORY OTC CHARGE

## 2025-01-02 DIAGNOSIS — Z77.21 PERSONAL HISTORY OF EXPOSURE TO POTENTIALLY HAZARDOUS BODY FLUIDS: ICD-10-CM

## 2025-01-02 DIAGNOSIS — Z77.21 EXPOSURE TO POTENTIALLY HAZARDOUS BODY FLUIDS: ICD-10-CM

## 2025-01-11 PROCEDURE — RXMED WILLOW AMBULATORY MEDICATION CHARGE

## 2025-01-13 ENCOUNTER — SPECIALTY PHARMACY (OUTPATIENT)
Dept: PHARMACY | Facility: CLINIC | Age: 55
End: 2025-01-13

## 2025-01-14 ENCOUNTER — PHARMACY VISIT (OUTPATIENT)
Dept: PHARMACY | Facility: CLINIC | Age: 55
End: 2025-01-14
Payer: COMMERCIAL

## 2025-01-20 ENCOUNTER — APPOINTMENT (OUTPATIENT)
Dept: CARDIOLOGY | Facility: CLINIC | Age: 55
End: 2025-01-20
Payer: COMMERCIAL

## 2025-01-22 PROBLEM — F41.9 ANXIETY AND DEPRESSION: Status: RESOLVED | Noted: 2023-03-30 | Resolved: 2025-01-22

## 2025-01-22 PROBLEM — Z77.21 PERSONAL HISTORY OF EXPOSURE TO POTENTIALLY HAZARDOUS BODY FLUIDS: Status: ACTIVE | Noted: 2025-01-22

## 2025-01-22 PROBLEM — G47.00 INSOMNIA: Status: RESOLVED | Noted: 2023-03-30 | Resolved: 2025-01-22

## 2025-01-22 PROBLEM — H91.93 HEARING LOSS OF BOTH EARS: Status: RESOLVED | Noted: 2023-03-30 | Resolved: 2025-01-22

## 2025-01-22 PROBLEM — D18.01 HEMANGIOMA OF SKIN AND SUBCUTANEOUS TISSUE: Status: ACTIVE | Noted: 2020-07-14

## 2025-01-22 PROBLEM — F32.A ANXIETY AND DEPRESSION: Status: RESOLVED | Noted: 2023-03-30 | Resolved: 2025-01-22

## 2025-01-22 PROBLEM — F33.8 SEASONAL AFFECTIVE DISORDER (CMS-HCC): Status: ACTIVE | Noted: 2025-01-22

## 2025-01-22 PROBLEM — E87.6 HYPOKALEMIA: Status: RESOLVED | Noted: 2023-03-30 | Resolved: 2025-01-22

## 2025-01-22 NOTE — PROGRESS NOTES
[facility] [place]   [facility]omy[facility]    Primary Care Physician: Antonio Perera DO  Primary Cardiologist:  Dr. Kaiden Miles  Date of Visit: 2025 11:00 AM EST     HPI / Summary:   Blake Dominique is a 54 y.o. male with obstructive HCM who presents for follow up of HCM.     Since his last visit his genetic testing showed no pathogenic mutations.  He continues to feel very well and was able to go up and down 4 flights of stairs in the parking garage recently.  TTE from 2025 shows LVEF 60% with no significant change in EF or LVOT gradient (which is negligible).    HCM History  The patient was initially diagnosed in  when an echocardiogram (which was done for a mumur) was suggestive of hypertrophic cardiomyopathy. As result he had a cardiac MRI which confirmed the diagnosis.   Family History of HCM: No  NYHA Class: I  Wall thickness: 1.6  EF: 60%  LVOT obstruction: Yes, prior to mavacamten  ICD or PPM: None  Prior septal reduction therapy: None  Genetic Testing: negative   Parents:   Mother -  at age 82, dementia, fell and broke her hip  Father - vasculopath, had a stroke, MI, fem pop, AAA, heavy smoker  Children: None  Siblings:   Has 3 older brothers and an older sister - no known HCM  None have been screened    Sudden Cardiac Arrest Risk Factors:   Syncope: No  Wall thickness above 30 mm: No  Apical aneurysm: None  NSVT by ambulatory monitor: No  EF less than 50%: No  LGE by CMR: Minimal   Family history of SCA in a family member with HCM: No  SCA risk: Likely low    ROS: Relevant review of symptoms of negative unless discussed above.     Problems:   Patient Active Problem List   Diagnosis    Abnormal EKG    Chest heaviness    Dysphagia, pharyngoesophageal phase    Eosinophilic esophagitis    Food impaction of esophagus    Gilbert's syndrome    Heart murmur on physical examination    Heart murmur, systolic    Hiatal hernia    HLD  (hyperlipidemia)    Hypertension, essential, benign    Class 1 obesity due to excess calories with serious comorbidity and body mass index (BMI) of 30.0 to 30.9 in adult    Vitamin D deficiency    Left ventricular outflow tract obstruction (HHS-HCC)    Asymmetric septal hypertrophy    Umbilical hernia without obstruction and without gangrene    Dental injury    Cardiomyopathy, hypertrophic (Multi)    Hemangioma of skin and subcutaneous tissue    Personal history of exposure to potentially hazardous body fluids    Seasonal affective disorder (CMS-HCC)       Medical History:   Past Medical History:   Diagnosis Date    Anxiety     Depression     Gilbert's syndrome     2.3.24: bili, total: 1.7    Hyperlipidemia     Hypertension     Hypertrophic obstructive cardiomyopathy (HOCM) (Multi)     cMRI 5/30:1. Hypertrophic obstructive cardiomyopathy. Basal anterior septum 1.6 cm. No evidence of left ventricular apical aneurysm. 2. Normal left ventricular cavity size with hyperdynamic systolic function. Ejection fraction 72%. 3. Asymmetric septal left ventricular hypertrophy. Basal anterior septum 1.6 cm. LV mass index 101 g/m2 (normal 57-91).    Left ventricular outflow tract obstruction (HHS-HCC)     severe - prompted cMRI which shwed hypertrophic obstructive cardiomyopathy    Pre-operative clearance     cardiac clearance requested    Umbilical hernia     Vitamin D deficiency        Surgical Hx:   Past Surgical History:   Procedure Laterality Date    COLONOSCOPY      HERNIA REPAIR Left     inguinal hernia as a child    TONSILLECTOMY      UMBILICAL HERNIA REPAIR  07/11/2024    UPPER GASTROINTESTINAL ENDOSCOPY          Family Hx:   Family History   Problem Relation Name Age of Onset    Dementia Mother      Other (Cardiac disorder) Father      Stroke Father      Colon cancer Other cousin         Social Hx:  Fun: travel, walks his dog, [employer], gym ([employer])  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 Take 2 capsules one time within 72 hours of an unprotected sexual encounter. Take with at least 8 ounces (large glass) of water. Do not lie down for 30 minutes after    emtricitabine-tenofovir, TDF, (Truvada) 200-300 mg tablet 1 tablet, oral, Daily    escitalopram (LEXAPRO) 20 mg, oral, Daily    metoprolol succinate XL (TOPROL-XL) 25 mg, oral, Daily, Do not crush or chew.    multivitamin tablet 1 tablet, Daily    pantoprazole (PROTONIX) 40 mg, oral, Daily, Do not crush, chew, or split.    tadalafil (Cialis) 10 mg tablet Take 1 tablet (10 mg) by mouth once daily as needed.       Allergies  Lisinopril    Exam:   Vitals: /86 (BP Location: Left arm, Patient Position: Sitting)   Pulse 72   Ht 1.88 m (6' 2\")   Wt 112 kg (247 lb 12.8 oz)   SpO2 92%   BMI 31.82 kg/m²   Wt Readings from Last 5 Encounters:   01/23/25 112 kg (247 lb 12.8 oz)   12/19/24 112 kg (246 lb)   10/29/24 110 kg (241 lb 9.6 oz)   09/30/24 114 kg (252 lb 1.6 oz)   09/30/24 115 kg (254 lb)     GEN: Pleasant, well-appearing, no acute distress.  HEENT: JVP not elevated  CHEST: Clear to auscultation, No wheeze, good air movement.  CV: normal rate, regular rhythm, no systolic murmur at rest, with valsalva  EXT: Warm, well perfused, No LE edema.   NEURO: grossly non focal  SKIN: No obvious rashes     Labs:   Lipids  Lab Results   Component Value Date    CHOL 132 02/03/2024    CHOL 141 01/28/2023    CHOL 149 09/03/2022 " "    Lab Results   Component Value Date    HDL 31.0 02/03/2024    HDL 37.9 (A) 01/28/2023    HDL 37.7 (A) 09/03/2022     Lab Results   Component Value Date    LDLCALC 70 02/03/2024     Lab Results   Component Value Date    TRIG 157 (H) 02/03/2024    TRIG 203 (H) 01/28/2023    TRIG 187 (H) 09/03/2022     No components found for: \"CHOLHDL\"    Hemoglobin A1C  Lab Results   Component Value Date    HGBA1C 4.9 02/03/2024       BMP  Lab Results   Component Value Date    GLUCOSE 87 01/23/2025    CALCIUM 9.9 01/23/2025     01/23/2025    K 4.3 01/23/2025    CO2 26 01/23/2025     01/23/2025    BUN 11 01/23/2025    CREATININE 0.88 01/23/2025         Notable Studies: imaging personally reviewed and summarized by me below  EKG:  -3/22/2024: NSR, LVH with TWI in I and avL, no other ST changes  -8/2/2024: NSR, LVH, Q in III and avF    Echo:  -4/18/2024: LVEF 70%, asymmetric septal hypertrophy, resting LVOT gradient is 72 mmHg, moderate MAC with obstructive septal contact, max IVS 1.6 cm  -(week 4 mavacamten) 9/3/2024: TTE 9/3 showed LF 65-70% with LVOT gradient at rest 8 mmHg and 10 mmHg provoked.   -(week 8 mavacamten) 9/30/2024: LVF ~65%, basal septal hypertrophy of 1.6 cm, resting LVOT gradient 7 mmHg, 14 mmHg with valsalva, mild MR, there is mild MIGUEL without septal contact.     Ambulatory Rhythm Monitor:   -7/5/24-7/12/24: HR  bpm, average HR 76 bpm, <1% PVCs, <1% PACs, 4 episodes of SVT with fastest 142 bpm, longest being 5 beats.     Cardiac MRI:  -5/30/2024: FABIENNE up to 1.6 cm, with MIGUEL suggestive of LVOT obstruction, no significant LGE, LVEF 72%, thickened redundant mitral valve leaflets contributing to MIGUEL, moderate MR    Stress Test:  -CPET 9/3/2024: resting HR 75 bpm, peak  bpm, 99% MPHR, resting /68, peak /70, peak VO2 29 ml/kg/min (83% achieved), O2 pulse increased from 6 to 19, VE/VECO2 32, RER 1.33, Breathing reserve 23%, SaO2 99%. No arrhythmias. Ventilatory threshold occurred at 27 " "(94% of peak VO2) corresponding to 124 bpm.     Assessment and Plan  Blake Dominique is a 54 y.o. male with obstructive HCM who presents for HCM follow up.     #Symptoms: Greatly improved on mavacamten 5 mg. TTE on 25 shows no obstruction, max LVOT gradient 12 mmHg on personal review. Continue echo schedule per FDA protocol.  -continue mavacamten 5 mg  -metoprolol succinate 25 mg    #Family Screening: Gene negative. Both of his parents are , not from hypertrophic cardiomyopathy.  He has 4 older siblings. One has an aortic aneurysm and has had echos before. Others haven't been definitively screened.     #SCA risk: He does not have any high risk features for sudden cardiac death from hypertrophic cardiopathy.  We discussed there is no such thing as zero risk, but he is low risk.    #Exercise Recommendations:   There is debate regarding exercising with hypertrophic cardiomyopathy due to its association with sudden death, in both athletes and non athletes. For that reason, it was historically advised that people with HCM not exercise. Recent data suggest that the risk of sudden death with exercise is lower than previously thought, though not \"no risk\". There is heterogeneity among the presentations of HCM and for this reason, the guidelines note that the exact risk for each person for each activity is unknown.  Guidelines recommend risk stratification by an expert in HCM and shared decision making with patients regarding the goals of exercise and the risks and benefits of exercise. It is important to note that sudden cardiac arrest/death may happen even in individuals with no classic risk factors. Recent data from the RESET-HCM trial suggest that moderate intensity interval training is safe and effective in individuals with HCM. The LIVE-HCM trial, which was published in , suggests that vigorous exercise is not associated with a higher rate of adverse events than low or moderate intensity exercise. The " " guidelines on exercising with HCM (2020) and the ACC/AHA HCM guidelines (2020) have evolved. Mild-moderate intensity exercise is now considered beneficial for most people and recommended for at least 150 mins per week (divided over 5 sessions) for general health improvement purposes. Certain individuals, after risk stratification can participate in intense physical activity and competition. For those who have been sedentary, gradual increase in the length and intensity of exercise is recommended. A 5 minute warm up and cool down period are always recommended as is maintaining adequate hydration, avoiding extreme conditions (extreme cold or heat), continuing routine medication adherence, and not exercising while ill. As a safety precaution, competitive and/or vigorous exercise should be done with other individuals in the area and preferably in a location where an external defibrillator is accessible.     We discussed the following recommendations:   For weight trainin-8 repetitions and 3 sets. The weight should be low enough that the first repetition feels similarly hard to the last repetition. Avoid trying to \"max out\" the weight.   On CPET his resting HR was 75 with peak  bpm. His anaerobic threshold occurred around 125 bpm. As such moderate intensity exercise (per D'Ascenzi et al. [employer]  and ESC sports guidelines 2020) is 40-69% of pVO2 (VO2 11-19 ml/kg/min corresponding to HR of ) and high intensity exercise for him is between 105-125 bpm (125 bpm is the anaerobic threshold). Will stay below anaerobic threshold for most of the workout for maximum benefit and theoretical lower risk but ok to increase above it for short periods of HIT training.     He will continue to follow with Dr. Miles as his primary cardiologist and with me for HCM related care.     Kwabena Reyes MD  Director, Sports Cardiology  [facility]    Part of this note was completed using dictation " and voice recognition software. Please excuse minor errors and typos.

## 2025-01-23 ENCOUNTER — HOSPITAL ENCOUNTER (OUTPATIENT)
Dept: CARDIOLOGY | Facility: CLINIC | Age: 55
Discharge: HOME | End: 2025-01-23
Payer: COMMERCIAL

## 2025-01-23 ENCOUNTER — LAB (OUTPATIENT)
Dept: LAB | Facility: LAB | Age: 55
End: 2025-01-23
Payer: COMMERCIAL

## 2025-01-23 ENCOUNTER — OFFICE VISIT (OUTPATIENT)
Dept: CARDIOLOGY | Facility: CLINIC | Age: 55
End: 2025-01-23
Payer: COMMERCIAL

## 2025-01-23 VITALS
OXYGEN SATURATION: 92 % | DIASTOLIC BLOOD PRESSURE: 86 MMHG | HEIGHT: 74 IN | WEIGHT: 247.8 LBS | HEART RATE: 72 BPM | BODY MASS INDEX: 31.8 KG/M2 | SYSTOLIC BLOOD PRESSURE: 134 MMHG

## 2025-01-23 DIAGNOSIS — I42.1 HOCM (HYPERTROPHIC OBSTRUCTIVE CARDIOMYOPATHY) (MULTI): Primary | ICD-10-CM

## 2025-01-23 DIAGNOSIS — Z77.21 PERSONAL HISTORY OF EXPOSURE TO POTENTIALLY HAZARDOUS BODY FLUIDS: ICD-10-CM

## 2025-01-23 DIAGNOSIS — I42.1 HOCM (HYPERTROPHIC OBSTRUCTIVE CARDIOMYOPATHY) (MULTI): ICD-10-CM

## 2025-01-23 LAB
ALBUMIN SERPL BCP-MCNC: 4.7 G/DL (ref 3.4–5)
ANION GAP SERPL CALC-SCNC: 13 MMOL/L (ref 10–20)
BUN SERPL-MCNC: 11 MG/DL (ref 6–23)
CALCIUM SERPL-MCNC: 9.9 MG/DL (ref 8.6–10.6)
CHLORIDE SERPL-SCNC: 103 MMOL/L (ref 98–107)
CO2 SERPL-SCNC: 26 MMOL/L (ref 21–32)
CREAT SERPL-MCNC: 0.88 MG/DL (ref 0.5–1.3)
EGFRCR SERPLBLD CKD-EPI 2021: >90 ML/MIN/1.73M*2
EJECTION FRACTION APICAL 4 CHAMBER: 67.2
EJECTION FRACTION: 58 %
GLUCOSE SERPL-MCNC: 87 MG/DL (ref 74–99)
HIV 1+2 AB+HIV1 P24 AG SERPL QL IA: NONREACTIVE
LEFT VENTRICLE INTERNAL DIMENSION DIASTOLE: 5.42 CM (ref 3.5–6)
PHOSPHATE SERPL-MCNC: 3.9 MG/DL (ref 2.5–4.9)
POTASSIUM SERPL-SCNC: 4.3 MMOL/L (ref 3.5–5.3)
SODIUM SERPL-SCNC: 138 MMOL/L (ref 136–145)
TREPONEMA PALLIDUM IGG+IGM AB [PRESENCE] IN SERUM OR PLASMA BY IMMUNOASSAY: NONREACTIVE

## 2025-01-23 PROCEDURE — 93308 TTE F-UP OR LMTD: CPT | Performed by: STUDENT IN AN ORGANIZED HEALTH CARE EDUCATION/TRAINING PROGRAM

## 2025-01-23 PROCEDURE — 87389 HIV-1 AG W/HIV-1&-2 AB AG IA: CPT

## 2025-01-23 PROCEDURE — 3075F SYST BP GE 130 - 139MM HG: CPT | Performed by: INTERNAL MEDICINE

## 2025-01-23 PROCEDURE — 3079F DIAST BP 80-89 MM HG: CPT | Performed by: INTERNAL MEDICINE

## 2025-01-23 PROCEDURE — 99214 OFFICE O/P EST MOD 30 MIN: CPT | Mod: 25 | Performed by: INTERNAL MEDICINE

## 2025-01-23 PROCEDURE — 99214 OFFICE O/P EST MOD 30 MIN: CPT | Performed by: INTERNAL MEDICINE

## 2025-01-23 PROCEDURE — 93321 DOPPLER ECHO F-UP/LMTD STD: CPT | Performed by: STUDENT IN AN ORGANIZED HEALTH CARE EDUCATION/TRAINING PROGRAM

## 2025-01-23 PROCEDURE — 87491 CHLMYD TRACH DNA AMP PROBE: CPT

## 2025-01-23 PROCEDURE — 93325 DOPPLER ECHO COLOR FLOW MAPG: CPT | Performed by: STUDENT IN AN ORGANIZED HEALTH CARE EDUCATION/TRAINING PROGRAM

## 2025-01-23 PROCEDURE — 80069 RENAL FUNCTION PANEL: CPT

## 2025-01-23 PROCEDURE — 3008F BODY MASS INDEX DOCD: CPT | Performed by: INTERNAL MEDICINE

## 2025-01-23 PROCEDURE — 87591 N.GONORRHOEAE DNA AMP PROB: CPT

## 2025-01-23 PROCEDURE — 2500000004 HC RX 250 GENERAL PHARMACY W/ HCPCS (ALT 636 FOR OP/ED): Performed by: INTERNAL MEDICINE

## 2025-01-23 PROCEDURE — 86780 TREPONEMA PALLIDUM: CPT

## 2025-01-23 PROCEDURE — 93325 DOPPLER ECHO COLOR FLOW MAPG: CPT

## 2025-01-23 RX ADMIN — PERFLUTREN 10 ML OF DILUTION: 6.52 INJECTION, SUSPENSION INTRAVENOUS at 10:32

## 2025-01-23 ASSESSMENT — ENCOUNTER SYMPTOMS
LOSS OF SENSATION IN FEET: 0
OCCASIONAL FEELINGS OF UNSTEADINESS: 0
DEPRESSION: 0

## 2025-01-23 ASSESSMENT — PATIENT HEALTH QUESTIONNAIRE - PHQ9
1. LITTLE INTEREST OR PLEASURE IN DOING THINGS: NOT AT ALL
SUM OF ALL RESPONSES TO PHQ9 QUESTIONS 1 AND 2: 0
2. FEELING DOWN, DEPRESSED OR HOPELESS: NOT AT ALL

## 2025-01-23 ASSESSMENT — PAIN SCALES - GENERAL: PAINLEVEL_OUTOF10: 0-NO PAIN

## 2025-01-23 ASSESSMENT — COLUMBIA-SUICIDE SEVERITY RATING SCALE - C-SSRS
1. IN THE PAST MONTH, HAVE YOU WISHED YOU WERE DEAD OR WISHED YOU COULD GO TO SLEEP AND NOT WAKE UP?: NO
2. HAVE YOU ACTUALLY HAD ANY THOUGHTS OF KILLING YOURSELF?: NO
6. HAVE YOU EVER DONE ANYTHING, STARTED TO DO ANYTHING, OR PREPARED TO DO ANYTHING TO END YOUR LIFE?: NO

## 2025-01-23 NOTE — Clinical Note
LENY Amos is doing really well on mavacamten, now NYHA I with negligible LVOT gradient. Will see him back as per the FDA/REMS protocol echo schedule in about 3 months. -Ajith

## 2025-01-24 DIAGNOSIS — Z77.21 PERSONAL HISTORY OF EXPOSURE TO POTENTIALLY HAZARDOUS BODY FLUIDS: ICD-10-CM

## 2025-01-24 PROBLEM — I42.1 HOCM (HYPERTROPHIC OBSTRUCTIVE CARDIOMYOPATHY) (MULTI): Status: ACTIVE | Noted: 2024-07-31

## 2025-01-24 LAB
C TRACH RRNA SPEC QL NAA+PROBE: NEGATIVE
N GONORRHOEA DNA SPEC QL PROBE+SIG AMP: NEGATIVE

## 2025-01-24 RX ORDER — EMTRICITABINE AND TENOFOVIR DISOPROXIL FUMARATE 200; 300 MG/1; MG/1
1 TABLET, FILM COATED ORAL DAILY
Qty: 30 TABLET | Refills: 2 | Status: SHIPPED | OUTPATIENT
Start: 2025-01-24

## 2025-01-31 ENCOUNTER — PHARMACY VISIT (OUTPATIENT)
Dept: PHARMACY | Facility: CLINIC | Age: 55
End: 2025-01-31
Payer: COMMERCIAL

## 2025-01-31 PROCEDURE — RXMED WILLOW AMBULATORY MEDICATION CHARGE

## 2025-02-03 PROCEDURE — RXMED WILLOW AMBULATORY MEDICATION CHARGE

## 2025-02-12 ENCOUNTER — SPECIALTY PHARMACY (OUTPATIENT)
Dept: PHARMACY | Facility: CLINIC | Age: 55
End: 2025-02-12

## 2025-02-14 ENCOUNTER — PHARMACY VISIT (OUTPATIENT)
Dept: PHARMACY | Facility: CLINIC | Age: 55
End: 2025-02-14
Payer: COMMERCIAL

## 2025-02-20 ENCOUNTER — APPOINTMENT (OUTPATIENT)
Dept: GENETICS | Facility: CLINIC | Age: 55
End: 2025-02-20
Payer: COMMERCIAL

## 2025-03-03 PROCEDURE — RXMED WILLOW AMBULATORY MEDICATION CHARGE

## 2025-03-06 ENCOUNTER — PHARMACY VISIT (OUTPATIENT)
Dept: PHARMACY | Facility: CLINIC | Age: 55
End: 2025-03-06
Payer: COMMERCIAL

## 2025-03-12 ENCOUNTER — SPECIALTY PHARMACY (OUTPATIENT)
Dept: PHARMACY | Facility: CLINIC | Age: 55
End: 2025-03-12

## 2025-03-12 PROCEDURE — RXMED WILLOW AMBULATORY MEDICATION CHARGE

## 2025-03-14 ENCOUNTER — PHARMACY VISIT (OUTPATIENT)
Dept: PHARMACY | Facility: CLINIC | Age: 55
End: 2025-03-14
Payer: COMMERCIAL

## 2025-03-24 PROCEDURE — RXMED WILLOW AMBULATORY MEDICATION CHARGE

## 2025-03-31 ENCOUNTER — PHARMACY VISIT (OUTPATIENT)
Dept: PHARMACY | Facility: CLINIC | Age: 55
End: 2025-03-31
Payer: COMMERCIAL

## 2025-04-08 ENCOUNTER — SPECIALTY PHARMACY (OUTPATIENT)
Dept: PHARMACY | Facility: CLINIC | Age: 55
End: 2025-04-08

## 2025-04-08 PROCEDURE — RXMED WILLOW AMBULATORY MEDICATION CHARGE

## 2025-04-10 ENCOUNTER — PHARMACY VISIT (OUTPATIENT)
Dept: PHARMACY | Facility: CLINIC | Age: 55
End: 2025-04-10
Payer: COMMERCIAL

## 2025-04-11 LAB
25(OH)D3+25(OH)D2 SERPL-MCNC: 37 NG/ML (ref 30–100)
ALBUMIN SERPL-MCNC: 4.8 G/DL (ref 3.6–5.1)
ALP SERPL-CCNC: 65 U/L (ref 35–144)
ALT SERPL-CCNC: 17 U/L (ref 9–46)
ANION GAP SERPL CALCULATED.4IONS-SCNC: 11 MMOL/L (CALC) (ref 7–17)
AST SERPL-CCNC: 18 U/L (ref 10–35)
BASOPHILS # BLD AUTO: 42 CELLS/UL (ref 0–200)
BASOPHILS NFR BLD AUTO: 0.8 %
BILIRUB SERPL-MCNC: 2.8 MG/DL (ref 0.2–1.2)
BUN SERPL-MCNC: 14 MG/DL (ref 7–25)
CALCIUM SERPL-MCNC: 10 MG/DL (ref 8.6–10.3)
CHLORIDE SERPL-SCNC: 100 MMOL/L (ref 98–110)
CHOLEST SERPL-MCNC: 121 MG/DL
CHOLEST/HDLC SERPL: 3 (CALC)
CO2 SERPL-SCNC: 26 MMOL/L (ref 20–32)
CREAT SERPL-MCNC: 0.91 MG/DL (ref 0.7–1.3)
EGFRCR SERPLBLD CKD-EPI 2021: 101 ML/MIN/1.73M2
EOSINOPHIL # BLD AUTO: 101 CELLS/UL (ref 15–500)
EOSINOPHIL NFR BLD AUTO: 1.9 %
ERYTHROCYTE [DISTWIDTH] IN BLOOD BY AUTOMATED COUNT: 13 % (ref 11–15)
EST. AVERAGE GLUCOSE BLD GHB EST-MCNC: 100 MG/DL
EST. AVERAGE GLUCOSE BLD GHB EST-SCNC: 5.5 MMOL/L
GLUCOSE SERPL-MCNC: 96 MG/DL (ref 65–99)
HBA1C MFR BLD: 5.1 % OF TOTAL HGB
HCT VFR BLD AUTO: 44.8 % (ref 38.5–50)
HDLC SERPL-MCNC: 41 MG/DL
HGB BLD-MCNC: 15.7 G/DL (ref 13.2–17.1)
LDLC SERPL CALC-MCNC: 58 MG/DL (CALC)
LYMPHOCYTES # BLD AUTO: 1405 CELLS/UL (ref 850–3900)
LYMPHOCYTES NFR BLD AUTO: 26.5 %
MCH RBC QN AUTO: 31.3 PG (ref 27–33)
MCHC RBC AUTO-ENTMCNC: 35 G/DL (ref 32–36)
MCV RBC AUTO: 89.2 FL (ref 80–100)
MONOCYTES # BLD AUTO: 466 CELLS/UL (ref 200–950)
MONOCYTES NFR BLD AUTO: 8.8 %
NEUTROPHILS # BLD AUTO: 3286 CELLS/UL (ref 1500–7800)
NEUTROPHILS NFR BLD AUTO: 62 %
NONHDLC SERPL-MCNC: 80 MG/DL (CALC)
PLATELET # BLD AUTO: 201 THOUSAND/UL (ref 140–400)
PMV BLD REES-ECKER: 9.9 FL (ref 7.5–12.5)
POTASSIUM SERPL-SCNC: 4.3 MMOL/L (ref 3.5–5.3)
PROT SERPL-MCNC: 7.2 G/DL (ref 6.1–8.1)
PSA SERPL-MCNC: 2.13 NG/ML
RBC # BLD AUTO: 5.02 MILLION/UL (ref 4.2–5.8)
SODIUM SERPL-SCNC: 137 MMOL/L (ref 135–146)
TRIGL SERPL-MCNC: 132 MG/DL
WBC # BLD AUTO: 5.3 THOUSAND/UL (ref 3.8–10.8)

## 2025-04-15 ENCOUNTER — TELEPHONE (OUTPATIENT)
Dept: CARDIOLOGY | Facility: HOSPITAL | Age: 55
End: 2025-04-15
Payer: COMMERCIAL

## 2025-04-16 NOTE — PROGRESS NOTES
"[facility] [place]   Hypertrophic Cardiomyopathy Center    Primary Care Physician: Antonio Perera DO  Primary Cardiologist:  Dr. Kaiden Miles  Date of Visit: 2025 11:00 AM EDT     HPI / Summary:   Blake Dominique is a 55 y.o. male with obstructive HCM who presents for follow up of HCM.     He is still feeling very well. Able to jog on the treadmill continuously for 30 minutes. Had an episode about a month ago where he experienced dizziness and a \"room spinning sensation\" for about 1 minute. TTE reviewed, LVEF 60-65%, mild MR, no LVOT obstruction.     HCM History  The patient was initially diagnosed in  when an echocardiogram (which was done for a mumur) was suggestive of hypertrophic cardiomyopathy. As result he had a cardiac MRI which confirmed the diagnosis.   Family History of HCM: No  NYHA Class: I  Wall thickness: 1.6 initially  EF: 60%  LVOT obstruction: Yes, prior to mavacamten  ICD or PPM: None  Prior septal reduction therapy: None  Genetic Testing: negative   Parents:   Mother -  at age 82, dementia, fell and broke her hip  Father - vasculopath, had a stroke, MI, fem pop, AAA, heavy smoker  Children: None  Siblings:   Has 3 older brothers and an older sister - no known HCM    Sudden Cardiac Arrest Risk Factors:   Syncope: No  Wall thickness above 30 mm: No  Apical aneurysm: None  NSVT by ambulatory monitor: No  EF less than 50%: No  LGE by CMR: Minimal   Family history of SCA in a family member with HCM: No  SCA risk: Likely low    ROS: Relevant review of symptoms of negative unless discussed above.     Problems:   Patient Active Problem List   Diagnosis    Abnormal EKG    Chest heaviness    Dysphagia, pharyngoesophageal phase    Eosinophilic esophagitis    Food impaction of esophagus    Gilbert's syndrome    Heart murmur on physical examination    Heart murmur, systolic    Hiatal hernia    HLD (hyperlipidemia)    Hypertension, essential, " benign    Class 1 obesity due to excess calories with serious comorbidity and body mass index (BMI) of 30.0 to 30.9 in adult    Vitamin D deficiency    Left ventricular outflow tract obstruction (HHS-HCC)    Asymmetric septal hypertrophy    Umbilical hernia without obstruction and without gangrene    Dental injury    HOCM (hypertrophic obstructive cardiomyopathy) (Multi)    Hemangioma of skin and subcutaneous tissue    Personal history of exposure to potentially hazardous body fluids    Seasonal affective disorder       Medical History:   Past Medical History:   Diagnosis Date    Anxiety     Depression     Gilbert's syndrome     2.3.24: bili, total: 1.7    Hyperlipidemia     Hypertension     Hypertrophic obstructive cardiomyopathy (HOCM) (Multi)     cMRI 5/30:1. Hypertrophic obstructive cardiomyopathy. Basal anterior septum 1.6 cm. No evidence of left ventricular apical aneurysm. 2. Normal left ventricular cavity size with hyperdynamic systolic function. Ejection fraction 72%. 3. Asymmetric septal left ventricular hypertrophy. Basal anterior septum 1.6 cm. LV mass index 101 g/m2 (normal 57-91).    Left ventricular outflow tract obstruction (HHS-HCC)     severe - prompted cMRI which shwed hypertrophic obstructive cardiomyopathy    Pre-operative clearance     cardiac clearance requested    Umbilical hernia     Vitamin D deficiency        Surgical Hx:   Past Surgical History:   Procedure Laterality Date    COLONOSCOPY      HERNIA REPAIR Left     inguinal hernia as a child    TONSILLECTOMY      UMBILICAL HERNIA REPAIR  07/11/2024    UPPER GASTROINTESTINAL ENDOSCOPY          Family Hx:   Family History   Problem Relation Name Age of Onset    Dementia Mother      Other (Cardiac disorder) Father      Stroke Father      Colon cancer Other cousin         Social Hx:  Fun: travel, walks his dog, [employer], gym ([employer])  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 Take 2 capsules one time within 72 hours of an unprotected sexual encounter. Take with at least 8 ounces (large glass) of water. Do not lie down for 30 minutes after    emtricitabine-tenofovir, TDF, (Truvada) 200-300 mg tablet 1 tablet, oral, Daily    escitalopram (LEXAPRO) 20 mg, oral, Daily    metoprolol succinate XL (TOPROL-XL) 25 mg, oral, Daily, Do not crush or chew.    multivitamin tablet 1 tablet, Daily    pantoprazole (PROTONIX) 40 mg, oral, Daily, Do not crush, chew, or split.    tadalafil (Cialis) 10 mg tablet Take 1 tablet (10 mg) by mouth once daily as needed.       Allergies  Lisinopril    Exam:   Vitals: /83 (BP Location: Left arm, Patient Position: Sitting, BP Cuff Size: Large adult)   Pulse 64   Ht 1.905 m (6' 3\")   Wt 111 kg (244 lb 9 oz)   SpO2 96%   BMI 30.57 kg/m²   Wt Readings from Last 5 Encounters:   04/17/25 111 kg (244 lb 9 oz)   01/23/25 112 kg (247 lb 12.8 oz)   12/19/24 112 kg (246 lb)   10/29/24 110 kg (241 lb 9.6 oz)   09/30/24 114 kg (252 lb 1.6 oz)     GEN: Pleasant, well-appearing, no acute distress.  HEENT: JVP not elevated  CHEST: Clear to auscultation, No wheeze, good air movement.  CV: normal rate, regular rhythm, no systolic murmur at rest or with valsalva  EXT: Warm, well perfused, No LE edema.   NEURO: grossly non focal  SKIN: No obvious rashes     Labs:   Lipids  Lab Results   Component Value Date    CHOL 121 04/10/2025    CHOL 132 02/03/2024    CHOL " "141 01/28/2023     Lab Results   Component Value Date    HDL 41 04/10/2025    HDL 31.0 02/03/2024    HDL 37.9 (A) 01/28/2023     Lab Results   Component Value Date    LDLCALC 58 04/10/2025    LDLCALC 70 02/03/2024     Lab Results   Component Value Date    TRIG 132 04/10/2025    TRIG 157 (H) 02/03/2024    TRIG 203 (H) 01/28/2023     No components found for: \"CHOLHDL\"    Hemoglobin A1C  Lab Results   Component Value Date    HGBA1C 5.1 04/10/2025       BMP  Lab Results   Component Value Date    GLUCOSE 96 04/10/2025    CALCIUM 10.0 04/10/2025     04/10/2025    K 4.3 04/10/2025    CO2 26 04/10/2025     04/10/2025    BUN 14 04/10/2025    CREATININE 0.91 04/10/2025         Notable Studies: imaging personally reviewed and summarized by me below  EKG:  -3/22/2024: NSR, LVH with TWI in I and avL, no other ST changes  -8/2/2024: NSR, LVH, Q in III and avF    Echo:  -4/18/2024: LVEF 70%, asymmetric septal hypertrophy, resting LVOT gradient is 72 mmHg, moderate MAC with obstructive septal contact, max IVS 1.6 cm  -(week 4 mavacamten) 9/3/2024: TTE 9/3 showed LF 65-70% with LVOT gradient at rest 8 mmHg and 10 mmHg provoked.   -(week 8 mavacamten) 9/30/2024: LVF ~65%, basal septal hypertrophy of 1.6 cm, resting LVOT gradient 7 mmHg, 14 mmHg with valsalva, mild MR, there is mild MIGUEL without septal contact.   -4/17/2025: LVEF 60-65%, mild MR, no significant LVOT gradient.     Ambulatory Rhythm Monitor:   -7/5/24-7/12/24: HR  bpm, average HR 76 bpm, <1% PVCs, <1% PACs, 4 episodes of SVT with fastest 142 bpm, longest being 5 beats.     Cardiac MRI:  -5/30/2024: FABIENNE up to 1.6 cm, with MIGUEL suggestive of LVOT obstruction, no significant LGE, LVEF 72%, thickened redundant mitral valve leaflets contributing to MIGUEL, moderate MR    Stress Test:  -CPET 9/3/2024: resting HR 75 bpm, peak  bpm, 99% MPHR, resting /68, peak /70, peak VO2 29 ml/kg/min (83% achieved), O2 pulse increased from 6 to 19, VE/VECO2 32, " "RER 1.33, Breathing reserve 23%, SaO2 99%. No arrhythmias. Ventilatory threshold occurred at 27 (94% of peak VO2) corresponding to 124 bpm.     Assessment and Plan  Blake Dominique is a 55 y.o. male with obstructive HCM who presents for HCM follow up.     #Symptoms: Greatly improved on mavacamten 5 mg. TTE on 25 shows no obstruction, max LVOT gradient 5 mmHg on personal review. Continue echo schedule per FDA protocol, next in 3 months  -continue mavacamten 5 mg  -metoprolol succinate 25 mg can continue for now, but if he would like to try to come off of it, that's reasonable too.     #Family Screening: Gene negative. Both of his parents are , not from hypertrophic cardiomyopathy.  He has 4 older siblings. Second oldest brother has an aortic aneurysm and has had echos before. Another sister is getting an echocardiogram.    #SCA risk: He does not have any high risk features for sudden cardiac death from hypertrophic cardiopathy.  We discussed there is no such thing as zero risk, but he is low risk.  -Will do a Holter in July at the next visit    #Exercise Recommendations:   There is debate regarding exercising with hypertrophic cardiomyopathy due to its association with sudden death, in both athletes and non athletes. For that reason, it was historically advised that people with HCM not exercise. Recent data suggest that the risk of sudden death with exercise is lower than previously thought, though not \"no risk\". There is heterogeneity among the presentations of HCM and for this reason, the guidelines note that the exact risk for each person for each activity is unknown.  Guidelines recommend risk stratification by an expert in HCM and shared decision making with patients regarding the goals of exercise and the risks and benefits of exercise. It is important to note that sudden cardiac arrest/death may happen even in individuals with no classic risk factors. Recent data from the RESET-HCM trial suggest " "that moderate intensity interval training is safe and effective in individuals with HCM. The LIVE-HCM trial, which was published in , suggests that vigorous exercise is not associated with a higher rate of adverse events than low or moderate intensity exercise. The European guidelines on exercising with HCM (2020) and the ACC/AHA HCM guidelines (2020) have evolved. Mild-moderate intensity exercise is now considered beneficial for most people and recommended for at least 150 mins per week (divided over 5 sessions) for general health improvement purposes. Certain individuals, after risk stratification can participate in intense physical activity and competition. For those who have been sedentary, gradual increase in the length and intensity of exercise is recommended. A 5 minute warm up and cool down period are always recommended as is maintaining adequate hydration, avoiding extreme conditions (extreme cold or heat), continuing routine medication adherence, and not exercising while ill. As a safety precaution, competitive and/or vigorous exercise should be done with other individuals in the area and preferably in a location where an external defibrillator is accessible.     We discussed the following recommendations:   For weight trainin-8 repetitions and 3 sets. The weight should be low enough that the first repetition feels similarly hard to the last repetition. Avoid trying to \"max out\" the weight.   On CPET his resting HR was 75 with peak  bpm. His anaerobic threshold occurred around 125 bpm. As such moderate intensity exercise (per D'Ascenzi et al. [employer]  and ESC sports guidelines 2020) is 40-69% of pVO2 (VO2 11-19 ml/kg/min corresponding to HR of ) and high intensity exercise for him is between 105-125 bpm (125 bpm is the anaerobic threshold). Will stay below anaerobic threshold for most of the workout for maximum benefit and theoretical lower risk but ok to increase above it for short " periods of HIT training.     He will continue to follow with Dr. Miles as his primary cardiologist and with me for HCM related care.     Kwabena Reyes MD  Director, Sports Cardiology  [facility]    Part of this note was completed using dictation and voice recognition software. Please excuse minor errors and typos.

## 2025-04-17 ENCOUNTER — OFFICE VISIT (OUTPATIENT)
Dept: GASTROENTEROLOGY | Facility: CLINIC | Age: 55
End: 2025-04-17
Payer: COMMERCIAL

## 2025-04-17 ENCOUNTER — HOSPITAL ENCOUNTER (OUTPATIENT)
Dept: CARDIOLOGY | Facility: CLINIC | Age: 55
Discharge: HOME | End: 2025-04-17
Payer: COMMERCIAL

## 2025-04-17 ENCOUNTER — APPOINTMENT (OUTPATIENT)
Facility: CLINIC | Age: 55
End: 2025-04-17
Payer: COMMERCIAL

## 2025-04-17 ENCOUNTER — OFFICE VISIT (OUTPATIENT)
Dept: CARDIOLOGY | Facility: CLINIC | Age: 55
End: 2025-04-17
Payer: COMMERCIAL

## 2025-04-17 VITALS
SYSTOLIC BLOOD PRESSURE: 127 MMHG | DIASTOLIC BLOOD PRESSURE: 83 MMHG | OXYGEN SATURATION: 96 % | WEIGHT: 244.56 LBS | HEIGHT: 75 IN | HEART RATE: 64 BPM | BODY MASS INDEX: 30.41 KG/M2

## 2025-04-17 VITALS
HEART RATE: 71 BPM | WEIGHT: 243 LBS | OXYGEN SATURATION: 95 % | SYSTOLIC BLOOD PRESSURE: 119 MMHG | BODY MASS INDEX: 30.21 KG/M2 | TEMPERATURE: 98.4 F | HEIGHT: 75 IN | RESPIRATION RATE: 18 BRPM | DIASTOLIC BLOOD PRESSURE: 80 MMHG

## 2025-04-17 VITALS
SYSTOLIC BLOOD PRESSURE: 120 MMHG | WEIGHT: 244.2 LBS | HEIGHT: 60 IN | DIASTOLIC BLOOD PRESSURE: 82 MMHG | HEART RATE: 65 BPM | TEMPERATURE: 98.9 F | BODY MASS INDEX: 47.94 KG/M2

## 2025-04-17 DIAGNOSIS — I42.1 HOCM (HYPERTROPHIC OBSTRUCTIVE CARDIOMYOPATHY) (MULTI): ICD-10-CM

## 2025-04-17 DIAGNOSIS — E66.09 CLASS 1 OBESITY DUE TO EXCESS CALORIES WITH SERIOUS COMORBIDITY AND BODY MASS INDEX (BMI) OF 30.0 TO 30.9 IN ADULT: ICD-10-CM

## 2025-04-17 DIAGNOSIS — E80.4 GILBERT'S SYNDROME: ICD-10-CM

## 2025-04-17 DIAGNOSIS — I42.1 HOCM (HYPERTROPHIC OBSTRUCTIVE CARDIOMYOPATHY) (MULTI): Primary | ICD-10-CM

## 2025-04-17 DIAGNOSIS — I10 HYPERTENSION, ESSENTIAL, BENIGN: ICD-10-CM

## 2025-04-17 DIAGNOSIS — K20.0 EOSINOPHILIC ESOPHAGITIS: ICD-10-CM

## 2025-04-17 DIAGNOSIS — Z00.00 HEALTH CARE MAINTENANCE: Primary | ICD-10-CM

## 2025-04-17 DIAGNOSIS — E78.5 HYPERLIPIDEMIA, UNSPECIFIED HYPERLIPIDEMIA TYPE: ICD-10-CM

## 2025-04-17 DIAGNOSIS — E66.811 CLASS 1 OBESITY DUE TO EXCESS CALORIES WITH SERIOUS COMORBIDITY AND BODY MASS INDEX (BMI) OF 30.0 TO 30.9 IN ADULT: ICD-10-CM

## 2025-04-17 DIAGNOSIS — Z23 NEED FOR PNEUMOCOCCAL VACCINE: ICD-10-CM

## 2025-04-17 DIAGNOSIS — F32.A ANXIETY AND DEPRESSION: ICD-10-CM

## 2025-04-17 DIAGNOSIS — F41.9 ANXIETY AND DEPRESSION: ICD-10-CM

## 2025-04-17 DIAGNOSIS — Z77.21 PERSONAL HISTORY OF EXPOSURE TO POTENTIALLY HAZARDOUS BODY FLUIDS: ICD-10-CM

## 2025-04-17 LAB
EJECTION FRACTION APICAL 4 CHAMBER: 67.2
EJECTION FRACTION: 63 %
LEFT ATRIUM VOLUME AREA LENGTH INDEX BSA: 29.9 ML/M2
LEFT VENTRICLE INTERNAL DIMENSION DIASTOLE: 5.71 CM (ref 3.5–6)
MITRAL VALVE E/A RATIO: 0.87
RIGHT VENTRICLE FREE WALL PEAK S': 15 CM/S
RIGHT VENTRICLE PEAK SYSTOLIC PRESSURE: 27.4 MMHG

## 2025-04-17 PROCEDURE — 93308 TTE F-UP OR LMTD: CPT | Performed by: INTERNAL MEDICINE

## 2025-04-17 PROCEDURE — 93325 DOPPLER ECHO COLOR FLOW MAPG: CPT

## 2025-04-17 PROCEDURE — 93325 DOPPLER ECHO COLOR FLOW MAPG: CPT | Performed by: INTERNAL MEDICINE

## 2025-04-17 PROCEDURE — 3008F BODY MASS INDEX DOCD: CPT | Performed by: FAMILY MEDICINE

## 2025-04-17 PROCEDURE — 90677 PCV20 VACCINE IM: CPT | Performed by: FAMILY MEDICINE

## 2025-04-17 PROCEDURE — 99212 OFFICE O/P EST SF 10 MIN: CPT | Performed by: NURSE PRACTITIONER

## 2025-04-17 PROCEDURE — 99396 PREV VISIT EST AGE 40-64: CPT | Performed by: FAMILY MEDICINE

## 2025-04-17 PROCEDURE — 3079F DIAST BP 80-89 MM HG: CPT | Performed by: FAMILY MEDICINE

## 2025-04-17 PROCEDURE — 3074F SYST BP LT 130 MM HG: CPT | Performed by: NURSE PRACTITIONER

## 2025-04-17 PROCEDURE — 3079F DIAST BP 80-89 MM HG: CPT | Performed by: NURSE PRACTITIONER

## 2025-04-17 PROCEDURE — 90471 IMMUNIZATION ADMIN: CPT | Performed by: FAMILY MEDICINE

## 2025-04-17 PROCEDURE — 99214 OFFICE O/P EST MOD 30 MIN: CPT | Mod: 25 | Performed by: INTERNAL MEDICINE

## 2025-04-17 PROCEDURE — 93321 DOPPLER ECHO F-UP/LMTD STD: CPT | Performed by: INTERNAL MEDICINE

## 2025-04-17 PROCEDURE — 3008F BODY MASS INDEX DOCD: CPT | Performed by: NURSE PRACTITIONER

## 2025-04-17 PROCEDURE — 3074F SYST BP LT 130 MM HG: CPT | Performed by: FAMILY MEDICINE

## 2025-04-17 PROCEDURE — 1036F TOBACCO NON-USER: CPT | Performed by: NURSE PRACTITIONER

## 2025-04-17 PROCEDURE — 1036F TOBACCO NON-USER: CPT | Performed by: FAMILY MEDICINE

## 2025-04-17 PROCEDURE — 99212 OFFICE O/P EST SF 10 MIN: CPT | Mod: 25 | Performed by: NURSE PRACTITIONER

## 2025-04-17 PROCEDURE — 3079F DIAST BP 80-89 MM HG: CPT | Performed by: INTERNAL MEDICINE

## 2025-04-17 PROCEDURE — 3074F SYST BP LT 130 MM HG: CPT | Performed by: INTERNAL MEDICINE

## 2025-04-17 PROCEDURE — 3008F BODY MASS INDEX DOCD: CPT | Performed by: INTERNAL MEDICINE

## 2025-04-17 PROCEDURE — 1036F TOBACCO NON-USER: CPT | Performed by: INTERNAL MEDICINE

## 2025-04-17 PROCEDURE — 99214 OFFICE O/P EST MOD 30 MIN: CPT | Performed by: INTERNAL MEDICINE

## 2025-04-17 RX ORDER — PANTOPRAZOLE SODIUM 40 MG/1
40 TABLET, DELAYED RELEASE ORAL DAILY
Qty: 90 TABLET | Refills: 3 | Status: SHIPPED | OUTPATIENT
Start: 2025-04-17 | End: 2026-04-17

## 2025-04-17 RX ORDER — ESCITALOPRAM OXALATE 20 MG/1
20 TABLET ORAL DAILY
Qty: 90 TABLET | Refills: 3 | Status: SHIPPED | OUTPATIENT
Start: 2025-04-17

## 2025-04-17 ASSESSMENT — ENCOUNTER SYMPTOMS
LOSS OF SENSATION IN FEET: 0
HEMATOLOGIC/LYMPHATIC NEGATIVE: 1
APNEA: 0
COUGH: 0
WHEEZING: 0
FEVER: 0
DEPRESSION: 0
CHILLS: 0
ENDOCRINE NEGATIVE: 1
CHEST TIGHTNESS: 0
NEUROLOGICAL NEGATIVE: 1
EYES NEGATIVE: 1
SHORTNESS OF BREATH: 0
DIFFICULTY URINATING: 0
ROS GI COMMENTS: SEE HPI
FATIGUE: 0
DIAPHORESIS: 0
PSYCHIATRIC NEGATIVE: 1
CARDIOVASCULAR NEGATIVE: 1
STRIDOR: 0
MUSCULOSKELETAL NEGATIVE: 1
HEADACHES: 0
RESPIRATORY NEGATIVE: 1
ALLERGIC/IMMUNOLOGIC NEGATIVE: 1
OCCASIONAL FEELINGS OF UNSTEADINESS: 0
SHORTNESS OF BREATH: 0

## 2025-04-17 ASSESSMENT — PATIENT HEALTH QUESTIONNAIRE - PHQ9
1. LITTLE INTEREST OR PLEASURE IN DOING THINGS: NOT AT ALL
2. FEELING DOWN, DEPRESSED OR HOPELESS: NOT AT ALL
2. FEELING DOWN, DEPRESSED OR HOPELESS: NOT AT ALL
SUM OF ALL RESPONSES TO PHQ9 QUESTIONS 1 AND 2: 0
1. LITTLE INTEREST OR PLEASURE IN DOING THINGS: NOT AT ALL
SUM OF ALL RESPONSES TO PHQ9 QUESTIONS 1 AND 2: 0

## 2025-04-17 ASSESSMENT — PAIN SCALES - GENERAL
PAINLEVEL_OUTOF10: 0-NO PAIN
PAINLEVEL_OUTOF10: 0-NO PAIN

## 2025-04-17 NOTE — PROGRESS NOTES
"Subjective   Patient ID: Blake Fowlers \"SAIMA\" is a 55 y.o. male who presents for Follow-up (Use of protonix. No symptoms have occurred since use. ). 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   HENT: Negative.     Eyes: Negative.    Respiratory: Negative.  Negative for apnea, cough, chest tightness, shortness of breath, wheezing and stridor.    Cardiovascular: Negative.    Gastrointestinal:         See HPI    Endocrine: Negative.    Genitourinary: Negative.  Negative for difficulty urinating.   Musculoskeletal: Negative.    Skin: Negative.    Allergic/Immunologic: Negative.    Neurological: Negative.    Hematological: Negative.    Psychiatric/Behavioral: Negative.         Objective   Physical Exam  Constitutional:       Appearance: He is normal weight.   Psychiatric:         Mood and Affect: Mood normal.         Assessment/Plan   Diagnoses and all orders for this visit:  Eosinophilic esophagitis  -     pantoprazole (Protonix) 40 mg EC tablet; Take 1 tablet (40 mg) by mouth once daily. Do not crush, chew, or split.     1. EOE-was doing well on Omeprazole however recently started on Mavacamten which is contraindicated with Omeprazole. He was started on pantoprazole and doing well.     F/up in 1 year      NICK Valenzuela-CNP 04/17/25 2:06 PM   " 5

## 2025-04-17 NOTE — PROGRESS NOTES
"Vanna CAMERON Masters \"E.J.\" is a 55 y.o. male who presents for Annual Exam.    HPI     Pt is here today for annual well exam. He is feeling well.  He is physically active, jogs on treadmill.     Pt sees  cardio for HCM (diagnosed in 2024), had echo and cardio follow up this morning.   Pt's primary cardiologist is Dr. Miles.  Pt's cardiomyopathy cardiologist is Dr. Reyes. He was started on mavacamten in August 2024.      He takes lexapro for anxiety/depression, well controlled.      He has EoE, on PPI.     He is on prep (truvada) prescribed through  ID.      Review of Systems   Respiratory:  Negative for shortness of breath.    Cardiovascular:  Negative for chest pain.   Neurological:  Negative for headaches.       Objective     Vitals:    04/17/25 1300   BP: 119/80   BP Location: Left arm   Patient Position: Sitting   Pulse: 71   Resp: 18   Temp: 36.9 °C (98.4 °F)   TempSrc: Temporal   SpO2: 95%   Weight: 110 kg (243 lb)   Height: 1.905 m (6' 3\")        Current Outpatient Medications   Medication Instructions    amLODIPine (Norvasc) 10 mg tablet TAKE 1 TABLET BY MOUTH EVERY DAY    ascorbic acid (Vitamin C) 500 mg chewable tablet GNP Vitamin C 500 MG Oral Tablet   Refills: 0        Start : 2-Nov-2020   Active    atorvastatin (LIPITOR) 20 mg, oral, Daily    Camzyos 5 mg, Daily    candesartan (ATACAND) 16 mg, oral, Daily    cetirizine (ZYRTEC) 10 mg, Daily    doxycycline (Vibramycin) 100 mg capsule Take 2 capsules (200 mg) by mouth if needed. Take 2 capsules one time within 72 hours of an unprotected sexual encounter. Take with at least 8 ounces (large glass) of water. Do not lie down for 30 minutes after    emtricitabine-tenofovir, TDF, (Truvada) 200-300 mg tablet 1 tablet, oral, Daily    escitalopram (LEXAPRO) 20 mg, oral, Daily    metoprolol succinate XL (TOPROL-XL) 25 mg, oral, Daily, Do not crush or chew.    multivitamin tablet 1 tablet, Daily    pantoprazole (PROTONIX) 40 mg, oral, Daily, Do not " crush, chew, or split.    tadalafil (Cialis) 10 mg tablet Take 1 tablet (10 mg) by mouth once daily as needed.        RX Allergies[1]     Surgical History[2]     Social History[3]     Family History[4]     Immunization History   Administered Date(s) Administered    Hepatitis A vaccine, age 19 years and greater (HAVRIX) 10/01/2019    Influenza, Unspecified 09/11/2020    Influenza, seasonal, injectable 09/11/2020, 09/30/2023    Moderna COVID-19 vaccine, bivalent, blue cap/gray label *Check age/dose* 01/27/2023    Moderna SARS-CoV-2 Vaccination 01/04/2021, 02/01/2021, 11/03/2021    Novel influenza-H1N1-09, preservative-free 10/30/2009    Pneumococcal polysaccharide vaccine, 23-valent, age 2 years and older (PNEUMOVAX 23) 08/31/2011    Small pox and monkeypox vaccine (Jynneos) *check dose/route* 08/21/2022, 10/04/2022    Tdap vaccine, age 7 year and older (BOOSTRIX, ADACEL) 01/15/2015, 02/01/2024    Zoster vaccine, recombinant, adult (SHINGRIX) 03/22/2022, 08/30/2022        Lab Results   Component Value Date    WBC 5.3 04/10/2025    RBC 5.02 04/10/2025    HGB 15.7 04/10/2025    HCT 44.8 04/10/2025    MCV 89.2 04/10/2025    MCH 31.3 04/10/2025    MCHC 35.0 04/10/2025     04/10/2025    MPV 9.9 04/10/2025     Lab Results   Component Value Date    GLUCOSE 96 04/10/2025     04/10/2025    K 4.3 04/10/2025     04/10/2025    CO2 26 04/10/2025    ANIONGAP 11 04/10/2025    BUN 14 04/10/2025    CREATININE 0.91 04/10/2025    CALCIUM 10.0 04/10/2025    ALBUMIN 4.8 04/10/2025    ALKPHOS 65 04/10/2025    PROT 7.2 04/10/2025    AST 18 04/10/2025    BILITOT 2.8 (H) 04/10/2025    ALT 17 04/10/2025      Lab Results   Component Value Date    CHOL 121 04/10/2025    HDL 41 04/10/2025    CHHDL 3.0 04/10/2025    LDLCALC 58 04/10/2025    VLDL 31 02/03/2024    TRIG 132 04/10/2025      Lab Results   Component Value Date    TSH 1.65 01/28/2023      Lab Results   Component Value Date    VITD25 37 04/10/2025      Lab Results    Component Value Date    HGBA1C 5.1 04/10/2025    BOEIMEUY7X 100 04/10/2025       Physical Exam  Vitals reviewed.   Constitutional:       General: He is not in acute distress.     Appearance: Normal appearance. He is obese. He is not ill-appearing.   HENT:      Head: Normocephalic and atraumatic.      Right Ear: Tympanic membrane, ear canal and external ear normal.      Left Ear: Tympanic membrane, ear canal and external ear normal.      Mouth/Throat:      Mouth: Mucous membranes are moist.      Pharynx: No oropharyngeal exudate or posterior oropharyngeal erythema.   Neck:      Thyroid: No thyroid mass or thyromegaly.   Cardiovascular:      Rate and Rhythm: Normal rate and regular rhythm.      Heart sounds: Murmur heard.   Pulmonary:      Effort: No respiratory distress.      Breath sounds: Normal breath sounds. No wheezing, rhonchi or rales.   Abdominal:      General: There is no distension.      Palpations: Abdomen is soft.      Tenderness: There is no abdominal tenderness.   Lymphadenopathy:      Cervical: No cervical adenopathy.   Skin:     General: Skin is warm and dry.      Findings: No rash.   Neurological:      Mental Status: He is alert and oriented to person, place, and time. Mental status is at baseline.   Psychiatric:         Mood and Affect: Mood normal.         Behavior: Behavior normal.         Assessment & Plan  Health care maintenance  Well Exam     Colon screening - Colonoscopy is up to date , 2021, repeat 7-10 years     Vaccines - tdap, shingrix utd    Prevnar 20 given today     I recommend yearly flu vaccine and routine COVID vaccinations as indicated     Recent labs revieweed     Healthy diet, routine exercise was discussed with patient         Orders:    POCT UA (nonautomated) manually resulted    Need for pneumococcal vaccine    Orders:    Pneumococcal conjugate vaccine, 20-valent (PREVNAR 20)    Gilbert's syndrome         Class 1 obesity due to excess calories with serious comorbidity and  body mass index (BMI) of 30.0 to 30.9 in adult         Eosinophilic esophagitis  On PPI - controlled        Anxiety and depression  Controlled on lexapro   Orders:    escitalopram (Lexapro) 20 mg tablet; Take 1 tablet (20 mg) by mouth once daily.    HOCM (hypertrophic obstructive cardiomyopathy) (Multi)  Sees cardio - on [facility].         Personal history of exposure to potentially hazardous body fluids  On Prep through  ID.        Hyperlipidemia, unspecified hyperlipidemia type         Hypertension, essential, benign                           [1]   Allergies  Allergen Reactions    Lisinopril Cough   [2]   Past Surgical History:  Procedure Laterality Date    COLONOSCOPY      HERNIA REPAIR Left     inguinal hernia as a child    TONSILLECTOMY      UMBILICAL HERNIA REPAIR  07/11/2024    UPPER GASTROINTESTINAL ENDOSCOPY     [3]   Social History  Tobacco Use    Smoking status: Former     Types: Cigarettes    Smokeless tobacco: Never    Tobacco comments:     Quit 2019 - 1/2 PPD x 20 yrs    Vaping Use    Vaping status: Never Used   Substance Use Topics    Alcohol use: Yes     Alcohol/week: 5.0 standard drinks of alcohol     Types: 5 Standard drinks or equivalent per week     Comment: occasionally    Drug use: Never   [4]   Family History  Problem Relation Name Age of Onset    Dementia Mother      Other (Cardiac disorder) Father      Stroke Father      Colon cancer Other cousin

## 2025-04-17 NOTE — ASSESSMENT & PLAN NOTE
Controlled on lexapro   Orders:    escitalopram (Lexapro) 20 mg tablet; Take 1 tablet (20 mg) by mouth once daily.

## 2025-04-28 ENCOUNTER — OFFICE VISIT (OUTPATIENT)
Dept: IMMUNOLOGY | Facility: CLINIC | Age: 55
End: 2025-04-28
Payer: COMMERCIAL

## 2025-04-28 VITALS
OXYGEN SATURATION: 97 % | DIASTOLIC BLOOD PRESSURE: 90 MMHG | HEART RATE: 63 BPM | BODY MASS INDEX: 135.8 KG/M2 | WEIGHT: 245.2 LBS | SYSTOLIC BLOOD PRESSURE: 132 MMHG

## 2025-04-28 DIAGNOSIS — Z77.21 PERSONAL HISTORY OF EXPOSURE TO POTENTIALLY HAZARDOUS BODY FLUIDS: ICD-10-CM

## 2025-04-28 DIAGNOSIS — Z77.21 EXPOSURE TO POTENTIALLY HAZARDOUS BODY FLUIDS: ICD-10-CM

## 2025-04-28 LAB
ALBUMIN SERPL BCP-MCNC: 4.8 G/DL (ref 3.4–5)
ANION GAP SERPL CALC-SCNC: 13 MMOL/L (ref 10–20)
BUN SERPL-MCNC: 15 MG/DL (ref 6–23)
CALCIUM SERPL-MCNC: 9.6 MG/DL (ref 8.6–10.6)
CHLORIDE SERPL-SCNC: 104 MMOL/L (ref 98–107)
CO2 SERPL-SCNC: 27 MMOL/L (ref 21–32)
CREAT SERPL-MCNC: 0.91 MG/DL (ref 0.5–1.3)
EGFRCR SERPLBLD CKD-EPI 2021: >90 ML/MIN/1.73M*2
GLUCOSE SERPL-MCNC: 133 MG/DL (ref 74–99)
HIV 1+2 AB+HIV1 P24 AG SERPL QL IA: NONREACTIVE
PHOSPHATE SERPL-MCNC: 3.6 MG/DL (ref 2.5–4.9)
POTASSIUM SERPL-SCNC: 3.8 MMOL/L (ref 3.5–5.3)
SODIUM SERPL-SCNC: 140 MMOL/L (ref 136–145)
TREPONEMA PALLIDUM IGG+IGM AB [PRESENCE] IN SERUM OR PLASMA BY IMMUNOASSAY: NONREACTIVE

## 2025-04-28 PROCEDURE — 99214 OFFICE O/P EST MOD 30 MIN: CPT | Performed by: NURSE PRACTITIONER

## 2025-04-28 PROCEDURE — 3080F DIAST BP >= 90 MM HG: CPT | Performed by: NURSE PRACTITIONER

## 2025-04-28 PROCEDURE — 36415 COLL VENOUS BLD VENIPUNCTURE: CPT | Performed by: NURSE PRACTITIONER

## 2025-04-28 PROCEDURE — 86780 TREPONEMA PALLIDUM: CPT | Performed by: NURSE PRACTITIONER

## 2025-04-28 PROCEDURE — 87491 CHLMYD TRACH DNA AMP PROBE: CPT | Performed by: NURSE PRACTITIONER

## 2025-04-28 PROCEDURE — 1036F TOBACCO NON-USER: CPT | Performed by: NURSE PRACTITIONER

## 2025-04-28 PROCEDURE — 3075F SYST BP GE 130 - 139MM HG: CPT | Performed by: NURSE PRACTITIONER

## 2025-04-28 PROCEDURE — 87389 HIV-1 AG W/HIV-1&-2 AB AG IA: CPT | Performed by: NURSE PRACTITIONER

## 2025-04-28 PROCEDURE — 80069 RENAL FUNCTION PANEL: CPT | Performed by: NURSE PRACTITIONER

## 2025-04-28 RX ORDER — EMTRICITABINE AND TENOFOVIR DISOPROXIL FUMARATE 200; 300 MG/1; MG/1
1 TABLET, FILM COATED ORAL DAILY
Qty: 30 TABLET | Refills: 2 | Status: SHIPPED | OUTPATIENT
Start: 2025-04-28 | End: 2025-04-29 | Stop reason: SDUPTHER

## 2025-04-28 ASSESSMENT — ENCOUNTER SYMPTOMS
RESPIRATORY NEGATIVE: 1
GASTROINTESTINAL NEGATIVE: 1
CARDIOVASCULAR NEGATIVE: 1
ENDOCRINE NEGATIVE: 1
CONSTITUTIONAL NEGATIVE: 1
ALLERGIC/IMMUNOLOGIC NEGATIVE: 1
NEUROLOGICAL NEGATIVE: 1
MUSCULOSKELETAL NEGATIVE: 1
HEMATOLOGIC/LYMPHATIC NEGATIVE: 1
EYES NEGATIVE: 1

## 2025-04-28 NOTE — PROGRESS NOTES
HIV PrEP Clinic Follow-up Visit:    Blake Dominique is a 55 y.o. male being seen for PrEP for prevention of HIV infection.  Current PrEP therapy:  Truvada    Risk factors for HIV infection include: (select all that apply):  MSM    Regular condom use? Sometimes  Received treatment for STD since last seen? No    Does not smoke  nor use any drugs.   Occasional alcohol use.    Feels well today.      Past Medical History  Medical History[1]    Allergies  Allergies[2]    Current Medications:  Current Medications[3]    Immunizations:  Immunization History   Administered Date(s) Administered    Hepatitis A vaccine, age 19 years and greater (HAVRIX) 10/01/2019    Influenza, Unspecified 09/11/2020    Influenza, seasonal, injectable 09/11/2020, 09/30/2023    Moderna COVID-19 vaccine, bivalent, blue cap/gray label *Check age/dose* 01/27/2023    Moderna SARS-CoV-2 Vaccination 01/04/2021, 02/01/2021, 11/03/2021    Novel influenza-H1N1-09, preservative-free 10/30/2009    Pneumococcal conjugate vaccine, 20-valent (PREVNAR 20) 04/17/2025    Pneumococcal polysaccharide vaccine, 23-valent, age 2 years and older (PNEUMOVAX 23) 08/31/2011    Small pox and monkeypox vaccine (Jynneos) *check dose/route* 08/21/2022, 10/04/2022    Tdap vaccine, age 7 year and older (BOOSTRIX, ADACEL) 01/15/2015, 02/01/2024    Zoster vaccine, recombinant, adult (SHINGRIX) 03/22/2022, 08/30/2022       Review of systems  Review of Systems   Constitutional: Negative.    HENT: Negative.     Eyes: Negative.    Respiratory: Negative.     Cardiovascular: Negative.    Gastrointestinal: Negative.    Endocrine: Negative.    Genitourinary: Negative.    Musculoskeletal: Negative.    Skin: Negative.    Allergic/Immunologic: Negative.    Neurological: Negative.    Hematological: Negative.    Psychiatric/Behavioral:          Some concerns about health care funding but he takes his meds and feels generally ok.          PHYSICAL EXAMINATION:  Visit Vitals  /90   Pulse  "63   Wt 111 kg (245 lb 3.2 oz)   SpO2 97%   .80 kg/m²   Smoking Status Former   BSA 1.67 m²       Physical Exam   Physical Exam  Vitals reviewed.   Constitutional:       Appearance: Normal appearance.   HENT:      Head: Normocephalic.   Cardiovascular:      Rate and Rhythm: Normal rate and regular rhythm.      Heart sounds: Normal heart sounds.   Pulmonary:      Effort: Pulmonary effort is normal.      Breath sounds: Normal breath sounds.   Abdominal:      General: Bowel sounds are normal.      Palpations: Abdomen is soft.   Musculoskeletal:         General: Normal range of motion.      Cervical back: Normal range of motion and neck supple.   Skin:     General: Skin is warm and dry.      Capillary Refill: Capillary refill takes less than 2 seconds.      Comments: Multiple scratches on mostly R hand - he was transplanting bushes in his yard.    Neurological:      Mental Status: He is alert and oriented to person, place, and time.   Psychiatric:         Mood and Affect: Mood normal.         Behavior: Behavior normal.         Pertinent data review:  HIV 1/2 Antigen/Antibody Screen with Reflex to Confirmation   Date Value Ref Range Status   01/23/2025 Nonreactive Nonreactive Final     Syphilis Total Ab   Date Value Ref Range Status   01/23/2025 Nonreactive Nonreactive Final     Comment:     No significant level of Treponema pallidum antibody detected.   Repeat testing in 2 to 4 weeks may be considered if early   infection or incubating syphilis infection is suspected.     No results found for: \"VDRLCSF\"  No results found for: \"RPR\"  Hepatitis C Ab   Date Value Ref Range Status   11/01/2019 NON-REACTIVE NONREACTIVE Final     Comment:      Patients receiving more than 5 mg/day of biotin may have interference   in test results.  A sample should be taken no sooner than eight hours   after  previous dose. Contact the testing laboratory for additional    information.        Hepatitis B Surface Ag   Date Value Ref Range " Status   11/01/2019 NONREACTIVE NONREACTIVE Final     Comment:      Patients receiving more than 5 mg/day of biotin may have interference   in test results.  A sample should be taken no sooner than eight hours   after  previous dose. Contact the testing laboratory for additional   information.        Hepatitis B Surface Ab   Date Value Ref Range Status   08/30/2021 <3.1 <10 mIU/mL Final     Comment:     INTERPRETIVE CRITERIA:  <10 mIU/mL....NONREACTIVE    >=10 mIU/mL...REACTIVE   .   Biotin interference may cause falsely decreased results.   Patients taking a Biotin dose of up to 5 mg/day should   refrain from taking Biotin for 24 hours before sample   collection. Providers may contact their local laboratory   for further information.       Hep A Total Ab Interp   Date Value Ref Range Status   11/01/2019 REACTIVE (A) NONREACTIVE Final     Comment:      Patients receiving more than 5 mg/day of biotin may have interference   in test results.  A sample should be taken no sooner than eight hours   after  previous dose. Contact [phone] for additional information.        GLUCOSE   Date Value Ref Range Status   04/10/2025 96 65 - 99 mg/dL Final     Comment:                   Fasting reference interval          SODIUM   Date Value Ref Range Status   04/10/2025 137 135 - 146 mmol/L Final     POTASSIUM   Date Value Ref Range Status   04/10/2025 4.3 3.5 - 5.3 mmol/L Final     CHLORIDE   Date Value Ref Range Status   04/10/2025 100 98 - 110 mmol/L Final     ELECTROLYTE BALANCE   Date Value Ref Range Status   04/10/2025 11 7 - 17 mmol/L (calc) Final     UREA NITROGEN (BUN)   Date Value Ref Range Status   04/10/2025 14 7 - 25 mg/dL Final     CREATININE   Date Value Ref Range Status   04/10/2025 0.91 0.70 - 1.30 mg/dL Final     EGFR   Date Value Ref Range Status   04/10/2025 101 > OR = 60 mL/min/1.73m2 Final     CALCIUM   Date Value Ref Range Status   04/10/2025 10.0 8.6 - 10.3 mg/dL Final     Phosphorus   Date Value Ref  Range Status   01/23/2025 3.9 2.5 - 4.9 mg/dL Final     Comment:     The performance characteristics of phosphorus testing in heparinized plasma have been validated by the individual  laboratory site where testing is performed. Testing on heparinized plasma is not approved by the FDA; however, such approval is not necessary.     ALBUMIN   Date Value Ref Range Status   04/10/2025 4.8 3.6 - 5.1 g/dL Final       Assessment and Plan:  Blake Dominique is a 55 y.o.male seen today for evaluation of appropriateness for continuation of  PrEP therapy as they continue to be at greater risk for acquiring HIV infection.  HIV Ag/Ab, Syphilis testing, and renal function will be collected today.  GC NAAT testing will be obtained from 3 sites, urethral, throat, rectum.  If HIV testing negative, prescription for PrEP will be renewed.    Will get routine labs and throat swab today and then face to face in 6 months.  Problem List Items Addressed This Visit    None  Visit Diagnoses         Exposure to potentially hazardous body fluids        Relevant Orders    C. trachomatis / N. gonorrhoeae, Amplified, Urogenital    HIV 1/2 Antigen/Antibody Screen with Reflex to Confirmation    Renal Function Panel    Syphilis Screen with Reflex        Thanks for coming in to see us today.  Have a great spring/summer.  Please call us with any questions or concerns.     Renate Hammer, APRN-CNP                     [1]   Past Medical History:  Diagnosis Date    Anxiety     Depression     Gilbert's syndrome     2.3.24: bili, total: 1.7    Hyperlipidemia     Hypertension     Hypertrophic obstructive cardiomyopathy (HOCM) (Multi)     cMRI 5/30:1. Hypertrophic obstructive cardiomyopathy. Basal anterior septum 1.6 cm. No evidence of left ventricular apical aneurysm. 2. Normal left ventricular cavity size with hyperdynamic systolic function. Ejection fraction 72%. 3. Asymmetric septal left ventricular hypertrophy. Basal anterior septum 1.6 cm. LV mass index 101  g/m2 (normal 57-91).    Left ventricular outflow tract obstruction (HHS-HCC)     severe - prompted cMRI which shwed hypertrophic obstructive cardiomyopathy    Pre-operative clearance     cardiac clearance requested    Umbilical hernia     Vitamin D deficiency    [2]   Allergies  Allergen Reactions    Lisinopril Cough   [3]   Current Outpatient Medications:     amLODIPine (Norvasc) 10 mg tablet, TAKE 1 TABLET BY MOUTH EVERY DAY, Disp: 90 tablet, Rfl: 3    ascorbic acid (Vitamin C) 500 mg chewable tablet, GNP Vitamin C 500 MG Oral Tablet  Refills: 0      Start : 2-Nov-2020  Active, Disp: , Rfl:     atorvastatin (Lipitor) 20 mg tablet, TAKE 1 TABLET BY MOUTH EVERY DAY, Disp: 90 tablet, Rfl: 3    candesartan (Atacand) 16 mg tablet, TAKE 1 TABLET BY MOUTH EVERY DAY, Disp: 90 tablet, Rfl: 3    cetirizine (ZyrTEC) 10 mg tablet, Take 1 tablet (10 mg) by mouth once daily., Disp: , Rfl:     doxycycline (Vibramycin) 100 mg capsule, Take 2 capsules (200 mg) by mouth if needed. Take 2 capsules one time within 72 hours of an unprotected sexual encounter. Take with at least 8 ounces (large glass) of water. Do not lie down for 30 minutes after, Disp: 30 capsule, Rfl: 0    emtricitabine-tenofovir, TDF, (Truvada) 200-300 mg tablet, Take 1 tablet by mouth once daily., Disp: 30 tablet, Rfl: 2    escitalopram (Lexapro) 20 mg tablet, Take 1 tablet (20 mg) by mouth once daily., Disp: 90 tablet, Rfl: 3    mavacamten (Camzyos) 5 mg capsule, Take 1 capsule (5 mg) by mouth once daily., Disp: , Rfl:     metoprolol succinate XL (Toprol-XL) 25 mg 24 hr tablet, Take 1 tablet (25 mg) by mouth once daily. Do not crush or chew., Disp: 90 tablet, Rfl: 3    multivitamin tablet, Take 1 tablet by mouth once daily., Disp: , Rfl:     pantoprazole (ProtoNix) 40 mg EC tablet, Take 1 tablet (40 mg) by mouth once daily. Do not crush, chew, or split., Disp: 90 tablet, Rfl: 3    tadalafil (Cialis) 10 mg tablet, Take 1 tablet (10 mg) by mouth once daily as  needed., Disp: , Rfl:

## 2025-04-28 NOTE — LETTER
04/28/25    Antonio Perera DO  [address]  [address]  [facility] [address]      Dear Dr. Antonio Perera DO,    I am writing to confirm that your patient, SAIMA Dominique, received care in my office on 04/28/25. I have enclosed a summary of the care provided to CARISSAJulita for your reference.    Please contact me with any questions you may have regarding the visit.    Sincerely,         Renate Hammer, APRN-CNP  [address]  [address]  [facility] [address] [phone]    CC: No Recipients

## 2025-04-29 ENCOUNTER — APPOINTMENT (OUTPATIENT)
Dept: IMMUNOLOGY | Facility: CLINIC | Age: 55
End: 2025-04-29
Payer: COMMERCIAL

## 2025-04-29 DIAGNOSIS — Z77.21 PERSONAL HISTORY OF EXPOSURE TO POTENTIALLY HAZARDOUS BODY FLUIDS: ICD-10-CM

## 2025-04-29 PROCEDURE — RXMED WILLOW AMBULATORY MEDICATION CHARGE

## 2025-04-29 RX ORDER — EMTRICITABINE AND TENOFOVIR DISOPROXIL FUMARATE 200; 300 MG/1; MG/1
1 TABLET, FILM COATED ORAL DAILY
Qty: 30 TABLET | Refills: 2 | Status: SHIPPED | OUTPATIENT
Start: 2025-04-29

## 2025-04-30 DIAGNOSIS — E78.5 HYPERLIPIDEMIA, UNSPECIFIED: ICD-10-CM

## 2025-05-01 RX ORDER — ATORVASTATIN CALCIUM 20 MG/1
20 TABLET, FILM COATED ORAL DAILY
Qty: 90 TABLET | Refills: 3 | Status: SHIPPED | OUTPATIENT
Start: 2025-05-01

## 2025-05-08 PROCEDURE — RXMED WILLOW AMBULATORY MEDICATION CHARGE

## 2025-05-09 ENCOUNTER — PHARMACY VISIT (OUTPATIENT)
Dept: PHARMACY | Facility: CLINIC | Age: 55
End: 2025-05-09
Payer: COMMERCIAL

## 2025-05-13 ENCOUNTER — SPECIALTY PHARMACY (OUTPATIENT)
Dept: PHARMACY | Facility: CLINIC | Age: 55
End: 2025-05-13

## 2025-05-14 ENCOUNTER — PHARMACY VISIT (OUTPATIENT)
Dept: PHARMACY | Facility: CLINIC | Age: 55
End: 2025-05-14
Payer: COMMERCIAL

## 2025-06-05 ENCOUNTER — SPECIALTY PHARMACY (OUTPATIENT)
Dept: PHARMACY | Facility: CLINIC | Age: 55
End: 2025-06-05

## 2025-06-05 PROCEDURE — RXMED WILLOW AMBULATORY MEDICATION CHARGE

## 2025-06-11 ENCOUNTER — APPOINTMENT (OUTPATIENT)
Dept: CARDIOLOGY | Facility: CLINIC | Age: 55
End: 2025-06-11
Payer: COMMERCIAL

## 2025-06-12 ENCOUNTER — PHARMACY VISIT (OUTPATIENT)
Dept: PHARMACY | Facility: CLINIC | Age: 55
End: 2025-06-12
Payer: COMMERCIAL

## 2025-06-20 ENCOUNTER — APPOINTMENT (OUTPATIENT)
Dept: CARDIOLOGY | Facility: CLINIC | Age: 55
End: 2025-06-20
Payer: COMMERCIAL

## 2025-06-20 VITALS
BODY MASS INDEX: 29.97 KG/M2 | HEART RATE: 74 BPM | DIASTOLIC BLOOD PRESSURE: 76 MMHG | WEIGHT: 241 LBS | HEIGHT: 75 IN | OXYGEN SATURATION: 95 % | SYSTOLIC BLOOD PRESSURE: 132 MMHG

## 2025-06-20 DIAGNOSIS — I42.1 HOCM (HYPERTROPHIC OBSTRUCTIVE CARDIOMYOPATHY) (MULTI): Primary | ICD-10-CM

## 2025-06-20 PROCEDURE — 99213 OFFICE O/P EST LOW 20 MIN: CPT

## 2025-06-20 PROCEDURE — 3078F DIAST BP <80 MM HG: CPT | Performed by: INTERNAL MEDICINE

## 2025-06-20 PROCEDURE — 99213 OFFICE O/P EST LOW 20 MIN: CPT | Performed by: INTERNAL MEDICINE

## 2025-06-20 PROCEDURE — 1036F TOBACCO NON-USER: CPT | Performed by: INTERNAL MEDICINE

## 2025-06-20 PROCEDURE — 3008F BODY MASS INDEX DOCD: CPT | Performed by: INTERNAL MEDICINE

## 2025-06-20 PROCEDURE — 3075F SYST BP GE 130 - 139MM HG: CPT | Performed by: INTERNAL MEDICINE

## 2025-06-20 NOTE — PROGRESS NOTES
"Name : Blake Dominique   : 1970   MRN : [id number]   ENC Date : 2025      Assessment and Plan:  Hypertrophic cardiomyopathy: outstanding response to mavacamten.  He is asymptomatic.  His gradient on echocardiogram is now normal.  I could not appreciate a heart murmur on exam today. No syncopal events.  Does not meet criteria for ICD implant.  Continue routine Holter and echocardiogram follow-up per [facility] center.  Hypertension: Blood pressure very nicely controlled.  Recommend no changes.  Cautioned him on avoiding dehydration with the heat.  Dyslipidemia: Tolerating atorvastatin well.  Recommend no changes.  Disp: RTO in 1 year or sooner if needed.  Continue [facility] center follow-up    HPI:  Patient returns today feeling quite well.  He is tolerating mavacamten well without side effects.  He denies any shortness of breath.  No syncopal events.  No palpitations.  No TIA or CVA-like symptoms.  Since I last saw him he is actually been hired by [facility] inpatient service and is enjoying working with that service.    Problem list overview: Problem List[1]    Meds: Medications Ordered Prior to Encounter[2]     VS:  /76 (BP Location: Left arm, Patient Position: Sitting)   Pulse 74   Ht 1.905 m (6' 3\")   Wt 109 kg (241 lb)   SpO2 95%   BMI 30.12 kg/m²     Vitals reviewed.   Neck:      Vascular: No JVD.   Pulmonary:      Breath sounds: Normal breath sounds.   Cardiovascular:      Normal rate. Regular rhythm.      Murmurs: There is no murmur.      No gallop.    Edema:     Peripheral edema absent.   Abdominal:      General: Abdomen is flat.      Palpations: Abdomen is soft.   Skin:     General: Skin is warm.         ECG: No results found for this or any previous visit.   ECHO: Results for orders placed during the hospital encounter of 25    Transthoracic Echo Limited     at [facility], [address] [place] [address]  Tel [phone] and Fax " [phone]    TRANSTHORACIC ECHOCARDIOGRAM REPORT      Patient Name:       BRITTNEE CAMERON MASTERS    Reading Physician:    [id number] Warren MD  Study Date:         4/17/2025           Ordering Provider:    Kendall WARREN  MRN/PID:            [id number]            Fellow:  Accession#:         [id number]        Nurse:  Date of Birth/Age:  1970 / 55      Sonographer:          Jean Marie thakkar                                     RDAME  Gender assigned at  M                   Additional Staff:  Birth:  Height:             187.96 cm           Admit Date:  Weight:             112.04 kg           Admission Status:     Outpatient  BSA / BMI:          2.38 m2 / 31.71     Encounter#:           [id number]  kg/m2  Blood Pressure:     136/82 mmHg         Department Location:  [facility]  Echo Lab    Study Type:    TRANSTHORACIC ECHO (TTE) LIMITED  Diagnosis/ICD: Obstructive hypertrophic cardiomyopathy-I42.1  Indication:    therapeutic drug monitoring patient mavacamten  CPT Code:      Echo Limited-68229; Doppler Limited-07199; Color Doppler-92407    Patient History:  Pertinent History: HTN and Hyperlipidemia. HOCM w/ FABIENNE, On Camzyos.    Study Detail: The following Echo studies were performed: 2D, Doppler, M-Mode and  color flow. Technically challenging study due to body habitus.      PHYSICIAN INTERPRETATION:  Left Ventricle: Left ventricular ejection fraction is normal, by visual estimate at 60-65%. There are no regional left ventricular wall motion abnormalities. The left ventricular cavity size is normal. There is normal septal and normal posterior left ventricular wall thickness. Left ventricular diastolic filling was not assessed. Asymmetric basal septal hypertrophy up to 1.4 cm. Mild MIGUEL without septal contact. Peak LVOT gradient with valsalva is 5 mmHg. No LVOT obstruction.  Left Atrium: The left atrial size is normal. There is a mobile interatrial septum.  Right Ventricle: The right ventricle is  normal in size. There is normal right ventricular global systolic function.  Right Atrium: The right atrium is normal in size.  Aortic Valve: The aortic valve is trileaflet. There is no evidence of aortic valve regurgitation.  Mitral Valve: The mitral valve is mildly thickened. There is mild to moderate posterior mitral annular calcification. There is mild mitral valve regurgitation.  Tricuspid Valve: The tricuspid valve is structurally normal. Tricuspid regurgitation was not assessed.  Pulmonic Valve: The pulmonic valve was not assessed. Pulmonic valve regurgitation was not assessed.  Pericardium: There is no pericardial effusion noted.  Aorta: The aortic root is normal. The aortic root appears normal in size and measures 3.90 cm.  Systemic Veins: The inferior vena cava appears normal in size, with IVC inspiratory collapse greater than 50%.  In comparison to the previous echocardiogram(s): Compared with study dated 1/23/2025, there is now less MIGUEL and mitral regurgitation. LVEF is similar. IVS appears similar in thickness.      CONCLUSIONS:  1. Limited echocardiogram for mavacamten monitoring.  2. Left ventricular ejection fraction is normal, by visual estimate at 60-65%.  3. Asymmetric basal septal hypertrophy up to 1.4 cm. Mild MIGUEL without septal contact. Peak LVOT gradient with valsalva is 5 mmHg. No LVOT obstruction.  4. There is normal right ventricular global systolic function.  5. Normal aortic root.  6. Compared with study dated 1/23/2025, there is now less MIGUEL and mitral regurgitation. LVEF is similar. IVS appears similar in thickness.    QUANTITATIVE DATA SUMMARY:    2D MEASUREMENTS:          Normal Ranges:  Ao Root d:       3.90 cm  (2.0-3.7cm)  LAs:             4.73 cm  (2.7-4.0cm)  IVSd:            0.88 cm  (0.6-1.1cm)  LVPWd:           0.82 cm  (0.6-1.1cm)  LVIDd:           5.71 cm  (3.9-5.9cm)  LVIDs:           3.68 cm  LV Mass Index:   78 g/m2  LVEDV Index:     63 ml/m2  LV % FS          35.5  %      LEFT ATRIUM:                  Normal Ranges:  LA Vol A4C:        84.2 ml    (22+/-6mL/m2)  LA Vol A2C:        59.2 ml  LA Vol BP:         71.0 ml  LA Vol Index A4C:  35.4ml/m2  LA Vol Index A2C:  24.9 ml/m2  LA Vol Index BP:   29.9 ml/m2  LA Area A4C:       23.8 cm2  LA Area A2C:       19.9 cm2  LA Major Axis A4C: 5.7 cm  LA Major Axis A2C: 5.7 cm      LV SYSTOLIC FUNCTION:  Normal Ranges:  EF-A4C View:    67 % (>=55%)  EF-A2C View:    61 %  EF-Biplane:     64 %  EF-Visual:      63 %  LV EF Reported: 63 %      LV DIASTOLIC FUNCTION:            Normal Ranges:  MV Peak E:             0.77 m/s   (0.7-1.2 m/s)  MV Peak A:             0.88 m/s   (0.42-0.7 m/s)  E/A Ratio:             0.87       (1.0-2.2)  MV e'                  0.080 m/s  (>8.0)  MV lateral e'          0.08 m/s  MV medial e'           0.08 m/s  E/e' Ratio:            9.56       (<8.0)  MV DT:                 217 msec   (150-240 msec)  PulmV Sys Javi:         63.08 cm/s  PulmV Mercado Javi:        35.86 cm/s  PulmV S/D Javi:         1.76  PulmV A Revs Javi:      27.16 cm/s      RIGHT VENTRICLE:  RV s' 0.15 m/s      TRICUSPID VALVE/RVSP:          Normal Ranges:  Peak TR Velocity:     2.20 m/s  RV Syst Pressure:     27 mmHg  (< 30mmHg)      PULMONARY VEINS:  PulmV A Revs Javi: 27.16 cm/s  PulmV Mercado Javi:   35.86 cm/s  PulmV S/D Javi:    1.76  PulmV Sys Javi:    63.08 cm/s      [id number] Kwabena eRyes MD  Electronically signed on 4/17/2025 at 10:39:09 AM        ** Final **     CT Results:  No results found for this or any previous visit from the past 365 days.      Kaiden Miles MD       [1]   Patient Active Problem List  Diagnosis    Abnormal EKG    Anxiety and depression    Chest heaviness    Dysphagia, pharyngoesophageal phase    Eosinophilic esophagitis    Food impaction of esophagus    Gilbert's syndrome    Heart murmur on physical examination    Heart murmur, systolic    Hiatal hernia    HLD (hyperlipidemia)    Hypertension, essential, benign    Class 1  obesity due to excess calories with serious comorbidity and body mass index (BMI) of 30.0 to 30.9 in adult    Vitamin D deficiency    Asymmetric septal hypertrophy    Umbilical hernia without obstruction and without gangrene    Dental injury    HOCM (hypertrophic obstructive cardiomyopathy) (Multi)    Hemangioma of skin and subcutaneous tissue    Personal history of exposure to potentially hazardous body fluids    Seasonal affective disorder   [2]   Current Outpatient Medications on File Prior to Visit   Medication Sig Dispense Refill    amLODIPine (Norvasc) 10 mg tablet TAKE 1 TABLET BY MOUTH EVERY DAY 90 tablet 3    ascorbic acid (Vitamin C) 500 mg chewable tablet GNP Vitamin C 500 MG Oral Tablet   Refills: 0        Start : 2-Nov-2020   Active      atorvastatin (Lipitor) 20 mg tablet TAKE 1 TABLET BY MOUTH EVERY DAY 90 tablet 3    candesartan (Atacand) 16 mg tablet TAKE 1 TABLET BY MOUTH EVERY DAY 90 tablet 3    cetirizine (ZyrTEC) 10 mg tablet Take 1 tablet (10 mg) by mouth once daily.      doxycycline (Vibramycin) 100 mg capsule Take 2 capsules (200 mg) by mouth if needed. Take 2 capsules one time within 72 hours of an unprotected sexual encounter. Take with at least 8 ounces (large glass) of water. Do not lie down for 30 minutes after 30 capsule 0    emtricitabine-tenofovir, TDF, (Truvada) 200-300 mg tablet Take 1 tablet by mouth once daily. 30 tablet 2    escitalopram (Lexapro) 20 mg tablet Take 1 tablet (20 mg) by mouth once daily. 90 tablet 3    mavacamten (Camzyos) 5 mg capsule Take 1 capsule (5 mg) by mouth once daily.      metoprolol succinate XL (Toprol-XL) 25 mg 24 hr tablet Take 1 tablet (25 mg) by mouth once daily. Do not crush or chew. 90 tablet 3    multivitamin tablet Take 1 tablet by mouth once daily.      pantoprazole (ProtoNix) 40 mg EC tablet Take 1 tablet (40 mg) by mouth once daily. Do not crush, chew, or split. 90 tablet 3    tadalafil (Cialis) 10 mg tablet Take 1 tablet (10 mg) by mouth  once daily as needed.       No current facility-administered medications on file prior to visit.

## 2025-06-27 PROCEDURE — RXMED WILLOW AMBULATORY MEDICATION CHARGE

## 2025-06-30 ENCOUNTER — PHARMACY VISIT (OUTPATIENT)
Dept: PHARMACY | Facility: CLINIC | Age: 55
End: 2025-06-30
Payer: COMMERCIAL

## 2025-07-07 ENCOUNTER — TELEPHONE (OUTPATIENT)
Dept: CARDIOLOGY | Facility: HOSPITAL | Age: 55
End: 2025-07-07
Payer: COMMERCIAL

## 2025-07-08 ENCOUNTER — SPECIALTY PHARMACY (OUTPATIENT)
Dept: PHARMACY | Facility: CLINIC | Age: 55
End: 2025-07-08

## 2025-07-08 PROCEDURE — RXMED WILLOW AMBULATORY MEDICATION CHARGE

## 2025-07-09 ENCOUNTER — PHARMACY VISIT (OUTPATIENT)
Dept: PHARMACY | Facility: CLINIC | Age: 55
End: 2025-07-09
Payer: COMMERCIAL

## 2025-07-09 NOTE — PROGRESS NOTES
[facility] [place]   Hypertrophic Cardiomyopathy Center    Primary Care Physician: Antonio Perera DO  Primary Cardiologist:  Dr. Kaiden Miles  Date of Visit: 07/10/2025 10:40 AM EDT     HPI / Summary:   Blake Dominique is a 55 y.o. male with obstructive HCM who presents for follow up of HCM.     TTE 7/10/2025, LVEF 58%, no MIGUEL or LVOT obstruction. No fevers, chills, unusual dyspnea or chest pain.     HCM History  The patient was initially diagnosed in  when an echocardiogram (which was done for a mumur) was suggestive of hypertrophic cardiomyopathy. As result he had a cardiac MRI which confirmed the diagnosis.   Family History of HCM: No  NYHA Class: I  Wall thickness: 1.6 initially  EF: 60%  LVOT obstruction: Yes, prior to mavacamten  ICD or PPM: None  Prior septal reduction therapy: None  Genetic Testing: negative   Parents:   Mother -  at age 82, dementia, fell and broke her hip  Father - vasculopath, had a stroke, MI, fem pop, AAA, heavy smoker  Children: None  Siblings:   Has 3 older brothers and an older sister - no known HCM    Sudden Cardiac Arrest Risk Factors:   Syncope: No  Wall thickness above 30 mm: No  Apical aneurysm: None  NSVT by ambulatory monitor: No  EF less than 50%: No  LGE by CMR: Minimal   Family history of SCA in a family member with HCM: No  SCA risk: Likely low    ROS: Relevant review of symptoms of negative unless discussed above.     Problems:   Patient Active Problem List   Diagnosis    Abnormal EKG    Anxiety and depression    Chest heaviness    Dysphagia, pharyngoesophageal phase    Eosinophilic esophagitis    Food impaction of esophagus    Gilbert's syndrome    Heart murmur on physical examination    Heart murmur, systolic    Hiatal hernia    HLD (hyperlipidemia)    Hypertension, essential, benign    Class 1 obesity due to excess calories with serious comorbidity and body mass index (BMI) of 30.0 to 30.9 in adult    Vitamin  D deficiency    Asymmetric septal hypertrophy    Umbilical hernia without obstruction and without gangrene    Dental injury    HOCM (hypertrophic obstructive cardiomyopathy) (Multi)    Hemangioma of skin and subcutaneous tissue    Personal history of exposure to potentially hazardous body fluids    Seasonal affective disorder       Medical History:   Past Medical History:   Diagnosis Date    Abnormal ECG 2021    Anxiety     Depression     Gilbert's syndrome     2.3.24: bili, total: 1.7    Hyperlipidemia     Hypertension 2006    Hypertrophic obstructive cardiomyopathy (HOCM) (Multi)     cMRI 5/30:1. Hypertrophic obstructive cardiomyopathy. Basal anterior septum 1.6 cm. No evidence of left ventricular apical aneurysm. 2. Normal left ventricular cavity size with hyperdynamic systolic function. Ejection fraction 72%. 3. Asymmetric septal left ventricular hypertrophy. Basal anterior septum 1.6 cm. LV mass index 101 g/m2 (normal 57-91).    Left ventricular outflow tract obstruction (HHS-HCC)     severe - prompted cMRI which shwed hypertrophic obstructive cardiomyopathy    Pre-operative clearance     cardiac clearance requested    Umbilical hernia     Vitamin D deficiency        Surgical Hx:   Past Surgical History:   Procedure Laterality Date    COLONOSCOPY      HERNIA REPAIR Left     inguinal hernia as a child    TONSILLECTOMY      UMBILICAL HERNIA REPAIR  07/11/2024    UPPER GASTROINTESTINAL ENDOSCOPY          Family Hx:   Family History   Problem Relation Name Age of Onset    Dementia Mother      Other (Cardiac disorder) Father      Stroke Father      Colon cancer Other cousin     Breast cancer Mother Dmitry     Blood Disorder Mother Dmitry     Hypertension Father Morgan     Heart disease Father Morgan     Cancer Brother Jaun     Cancer Brother Jaun         Social Hx:  Fun: travel, walks his dog, [employer], gym ([employer])  Occupation/School: Cardiology NP  EtOH/Smoking/Drugs: no smoking, rare alcohol, no drugs,  "supplement    Medications  Current Outpatient Medications   Medication Instructions    amLODIPine (Norvasc) 10 mg tablet TAKE 1 TABLET BY MOUTH EVERY DAY    ascorbic acid (Vitamin C) 500 mg chewable tablet GNP Vitamin C 500 MG Oral Tablet   Refills: 0        Start : 2-Nov-2020   Active    atorvastatin (LIPITOR) 20 mg, oral, Daily    Camzyos 5 mg, Daily    candesartan (ATACAND) 16 mg, oral, Daily    cetirizine (ZYRTEC) 10 mg, Daily    doxycycline (Vibramycin) 100 mg capsule Take 2 capsules (200 mg) by mouth if needed. Take 2 capsules one time within 72 hours of an unprotected sexual encounter. Take with at least 8 ounces (large glass) of water. Do not lie down for 30 minutes after    emtricitabine-tenofovir, TDF, (Truvada) 200-300 mg tablet 1 tablet, oral, Daily    escitalopram (LEXAPRO) 20 mg, oral, Daily    metoprolol succinate XL (TOPROL-XL) 25 mg, oral, Daily, Do not crush or chew.    multivitamin tablet 1 tablet, Daily    pantoprazole (PROTONIX) 40 mg, oral, Daily, Do not crush, chew, or split.    tadalafil (Cialis) 10 mg tablet Take 1 tablet (10 mg) by mouth once daily as needed.       Allergies  Lisinopril    Exam:   Vitals: /86 (BP Location: Right arm, Patient Position: Sitting, BP Cuff Size: Large adult)   Pulse 69   Ht 1.905 m (6' 3\")   Wt 111 kg (245 lb 3 oz)   SpO2 94%   BMI 30.65 kg/m²   Wt Readings from Last 5 Encounters:   07/10/25 111 kg (245 lb 3 oz)   06/20/25 109 kg (241 lb)   04/28/25 111 kg (245 lb 3.2 oz)   04/17/25 111 kg (244 lb 3.2 oz)   04/17/25 110 kg (243 lb)     GEN: Pleasant, well-appearing, no acute distress.  HEENT: JVP not elevated  CHEST: Clear to auscultation, No wheeze, good air movement.  CV: normal rate, regular rhythm, no systolic murmur at rest or with valsalva  EXT: Warm, well perfused, No LE edema.   NEURO: grossly non focal  SKIN: No obvious rashes     Labs:   Lipids  Lab Results   Component Value Date    CHOL 121 04/10/2025    CHOL 132 02/03/2024    CHOL 141 " "01/28/2023     Lab Results   Component Value Date    HDL 41 04/10/2025    HDL 31.0 02/03/2024    HDL 37.9 (A) 01/28/2023     Lab Results   Component Value Date    LDLCALC 58 04/10/2025    LDLCALC 70 02/03/2024     Lab Results   Component Value Date    TRIG 132 04/10/2025    TRIG 157 (H) 02/03/2024    TRIG 203 (H) 01/28/2023     No components found for: \"CHOLHDL\"    Hemoglobin A1C  Lab Results   Component Value Date    HGBA1C 5.1 04/10/2025       BMP  Lab Results   Component Value Date    GLUCOSE 133 (H) 04/28/2025    CALCIUM 9.6 04/28/2025     04/28/2025    K 3.8 04/28/2025    CO2 27 04/28/2025     04/28/2025    BUN 15 04/28/2025    CREATININE 0.91 04/28/2025         Notable Studies: imaging personally reviewed and summarized by me below  EKG:  -3/22/2024: NSR, LVH with TWI in I and avL, no other ST changes  -8/2/2024: NSR, LVH, Q in III and avF    Echo:  -4/18/2024: LVEF 70%, asymmetric septal hypertrophy, resting LVOT gradient is 72 mmHg, moderate MAC with obstructive septal contact, max IVS 1.6 cm  -(week 4 mavacamten) 9/3/2024: TTE 9/3 showed LF 65-70% with LVOT gradient at rest 8 mmHg and 10 mmHg provoked.   -(week 8 mavacamten) 9/30/2024: LVF ~65%, basal septal hypertrophy of 1.6 cm, resting LVOT gradient 7 mmHg, 14 mmHg with valsalva, mild MR, there is mild MIGUEL without septal contact.   -4/17/2025: LVEF 60-65%, mild MR, no significant LVOT gradient.     Ambulatory Rhythm Monitor:   -7/5/24-7/12/24: HR  bpm, average HR 76 bpm, <1% PVCs, <1% PACs, 4 episodes of SVT with fastest 142 bpm, longest being 5 beats.     Cardiac MRI:  -5/30/2024: FABIENNE up to 1.6 cm, with MIGUEL suggestive of LVOT obstruction, no significant LGE, LVEF 72%, thickened redundant mitral valve leaflets contributing to MIGUEL, moderate MR    Stress Test:  -CPET 9/3/2024: resting HR 75 bpm, peak  bpm, 99% MPHR, resting /68, peak /70, peak VO2 29 ml/kg/min (83% achieved), O2 pulse increased from 6 to 19, VE/VECO2 32, " RER 1.33, Breathing reserve 23%, SaO2 99%. No arrhythmias. Ventilatory threshold occurred at 27 (94% of peak VO2) corresponding to 124 bpm.     Assessment and Plan  Blake Dominique is a 55 y.o. male with obstructive HCM who presents for HCM follow up.     #Symptoms: Greatly improved on mavacamten 5 mg. TTE on 7/10/25 shows no obstruction, max LVOT gradient <10 mmHg on personal review. Continue echo schedule per FDA protocol, next in 6 months. His TTE shows small filamentous strands associated with the posterior mitral annular MAC, given that it has been present on several echocardiograms, suspect it may be mobile MAC. No signs or symptoms of infection/endocarditis  -continue mavacamten 5 mg  -hold metoprolol for 1 week, see if there is any difference in how he feels. If he doesn't feel any different, can stop this.   -he may be having dental work coming up, if so, can use antibiotic prophylaxis.     #Family Screening: Gene negative. Both of his parents are , not from hypertrophic cardiomyopathy.  He has 4 older siblings. Second oldest brother has an aortic aneurysm and has had echos before. Only one who hasn't been screened is his older brother.     #SCA risk: He does not have any high risk features for sudden cardiac death from hypertrophic cardiopathy.  We discussed there is no such thing as zero risk, but he is low risk.  -Holter  for rhythm assessment once prior authorization is obtained    He will continue to follow with Dr. Miles as his primary cardiologist and with me for HCM related care. Will see him again in 1 year.     Kwabena Reyes MD  Director, Sports Cardiology  [facility]    Part of this note was completed using dictation and voice recognition software. Please excuse minor errors and typos.

## 2025-07-10 ENCOUNTER — OFFICE VISIT (OUTPATIENT)
Dept: CARDIOLOGY | Facility: CLINIC | Age: 55
End: 2025-07-10
Payer: COMMERCIAL

## 2025-07-10 ENCOUNTER — HOSPITAL ENCOUNTER (OUTPATIENT)
Dept: CARDIOLOGY | Facility: CLINIC | Age: 55
Discharge: HOME | End: 2025-07-10
Payer: COMMERCIAL

## 2025-07-10 VITALS
OXYGEN SATURATION: 94 % | WEIGHT: 245.19 LBS | HEART RATE: 69 BPM | HEIGHT: 75 IN | BODY MASS INDEX: 30.49 KG/M2 | DIASTOLIC BLOOD PRESSURE: 86 MMHG | SYSTOLIC BLOOD PRESSURE: 123 MMHG

## 2025-07-10 DIAGNOSIS — I42.1 HOCM (HYPERTROPHIC OBSTRUCTIVE CARDIOMYOPATHY) (MULTI): ICD-10-CM

## 2025-07-10 DIAGNOSIS — I42.1 HOCM (HYPERTROPHIC OBSTRUCTIVE CARDIOMYOPATHY) (MULTI): Primary | ICD-10-CM

## 2025-07-10 DIAGNOSIS — R94.31 ABNORMAL EKG: ICD-10-CM

## 2025-07-10 LAB
AORTIC VALVE PEAK VELOCITY: 1.18 M/S
AV PEAK GRADIENT: 6 MMHG
EJECTION FRACTION APICAL 4 CHAMBER: 52.5
EJECTION FRACTION: 58 %
LEFT ATRIUM VOLUME AREA LENGTH INDEX BSA: 19.5 ML/M2
LEFT VENTRICLE INTERNAL DIMENSION DIASTOLE: 5.86 CM (ref 3.5–6)
MITRAL VALVE E/A RATIO: 0.98
RIGHT VENTRICLE FREE WALL PEAK S': 13 CM/S
RIGHT VENTRICLE PEAK SYSTOLIC PRESSURE: 28 MMHG
TRICUSPID ANNULAR PLANE SYSTOLIC EXCURSION: 2.5 CM

## 2025-07-10 PROCEDURE — 3008F BODY MASS INDEX DOCD: CPT | Performed by: INTERNAL MEDICINE

## 2025-07-10 PROCEDURE — 99212 OFFICE O/P EST SF 10 MIN: CPT | Mod: 25

## 2025-07-10 PROCEDURE — 93321 DOPPLER ECHO F-UP/LMTD STD: CPT | Performed by: INTERNAL MEDICINE

## 2025-07-10 PROCEDURE — 93308 TTE F-UP OR LMTD: CPT | Performed by: INTERNAL MEDICINE

## 2025-07-10 PROCEDURE — 1036F TOBACCO NON-USER: CPT | Performed by: INTERNAL MEDICINE

## 2025-07-10 PROCEDURE — 93325 DOPPLER ECHO COLOR FLOW MAPG: CPT

## 2025-07-10 PROCEDURE — 3074F SYST BP LT 130 MM HG: CPT | Performed by: INTERNAL MEDICINE

## 2025-07-10 PROCEDURE — 3079F DIAST BP 80-89 MM HG: CPT | Performed by: INTERNAL MEDICINE

## 2025-07-10 PROCEDURE — 93325 DOPPLER ECHO COLOR FLOW MAPG: CPT | Performed by: INTERNAL MEDICINE

## 2025-07-10 PROCEDURE — 99214 OFFICE O/P EST MOD 30 MIN: CPT | Performed by: INTERNAL MEDICINE

## 2025-07-10 ASSESSMENT — ENCOUNTER SYMPTOMS
LOSS OF SENSATION IN FEET: 0
DEPRESSION: 0
OCCASIONAL FEELINGS OF UNSTEADINESS: 0

## 2025-07-10 ASSESSMENT — PATIENT HEALTH QUESTIONNAIRE - PHQ9
2. FEELING DOWN, DEPRESSED OR HOPELESS: NOT AT ALL
SUM OF ALL RESPONSES TO PHQ9 QUESTIONS 1 AND 2: 0
1. LITTLE INTEREST OR PLEASURE IN DOING THINGS: NOT AT ALL

## 2025-07-10 ASSESSMENT — PAIN SCALES - GENERAL: PAINLEVEL_OUTOF10: 0-NO PAIN

## 2025-07-10 ASSESSMENT — COLUMBIA-SUICIDE SEVERITY RATING SCALE - C-SSRS
6. HAVE YOU EVER DONE ANYTHING, STARTED TO DO ANYTHING, OR PREPARED TO DO ANYTHING TO END YOUR LIFE?: NO
1. IN THE PAST MONTH, HAVE YOU WISHED YOU WERE DEAD OR WISHED YOU COULD GO TO SLEEP AND NOT WAKE UP?: NO
2. HAVE YOU ACTUALLY HAD ANY THOUGHTS OF KILLING YOURSELF?: NO

## 2025-07-22 ENCOUNTER — CLINICAL SUPPORT (OUTPATIENT)
Dept: IMMUNOLOGY | Facility: CLINIC | Age: 55
End: 2025-07-22
Payer: COMMERCIAL

## 2025-07-22 PROCEDURE — 36415 COLL VENOUS BLD VENIPUNCTURE: CPT | Performed by: NURSE PRACTITIONER

## 2025-07-22 PROCEDURE — 80069 RENAL FUNCTION PANEL: CPT | Performed by: NURSE PRACTITIONER

## 2025-07-22 PROCEDURE — 87389 HIV-1 AG W/HIV-1&-2 AB AG IA: CPT | Performed by: NURSE PRACTITIONER

## 2025-07-22 PROCEDURE — 86780 TREPONEMA PALLIDUM: CPT | Performed by: NURSE PRACTITIONER

## 2025-07-23 ENCOUNTER — HOSPITAL ENCOUNTER (OUTPATIENT)
Dept: CARDIOLOGY | Facility: HOSPITAL | Age: 55
Discharge: HOME | End: 2025-07-23
Payer: COMMERCIAL

## 2025-07-23 DIAGNOSIS — Z77.21 PERSONAL HISTORY OF EXPOSURE TO POTENTIALLY HAZARDOUS BODY FLUIDS: ICD-10-CM

## 2025-07-23 DIAGNOSIS — R94.31 ABNORMAL EKG: ICD-10-CM

## 2025-07-23 PROCEDURE — 93242 EXT ECG>48HR<7D RECORDING: CPT

## 2025-07-23 RX ORDER — EMTRICITABINE AND TENOFOVIR DISOPROXIL FUMARATE 200; 300 MG/1; MG/1
1 TABLET, FILM COATED ORAL DAILY
Qty: 30 TABLET | Refills: 2 | Status: SHIPPED | OUTPATIENT
Start: 2025-07-23

## 2025-07-31 PROCEDURE — RXMED WILLOW AMBULATORY MEDICATION CHARGE

## 2025-08-02 ENCOUNTER — PHARMACY VISIT (OUTPATIENT)
Dept: PHARMACY | Facility: CLINIC | Age: 55
End: 2025-08-02
Payer: COMMERCIAL

## 2025-08-08 ENCOUNTER — SPECIALTY PHARMACY (OUTPATIENT)
Dept: PHARMACY | Facility: CLINIC | Age: 55
End: 2025-08-08

## 2025-08-08 PROCEDURE — RXMED WILLOW AMBULATORY MEDICATION CHARGE

## 2025-08-12 ENCOUNTER — PHARMACY VISIT (OUTPATIENT)
Dept: PHARMACY | Facility: CLINIC | Age: 55
End: 2025-08-12
Payer: COMMERCIAL

## 2025-08-20 DIAGNOSIS — I10 ESSENTIAL (PRIMARY) HYPERTENSION: ICD-10-CM

## 2025-08-20 RX ORDER — CANDESARTAN 16 MG/1
16 TABLET ORAL DAILY
Qty: 90 TABLET | Refills: 3 | Status: SHIPPED | OUTPATIENT
Start: 2025-08-20

## 2025-08-22 ENCOUNTER — RESULTS FOLLOW-UP (OUTPATIENT)
Dept: CARDIOLOGY | Facility: CLINIC | Age: 55
End: 2025-08-22
Payer: COMMERCIAL

## 2025-09-01 DIAGNOSIS — I42.1 HOCM (HYPERTROPHIC OBSTRUCTIVE CARDIOMYOPATHY) (MULTI): Primary | ICD-10-CM

## 2025-09-02 RX ORDER — MAVACAMTEN 5 MG/1
1 CAPSULE, GELATIN COATED ORAL DAILY
Qty: 30 CAPSULE | Refills: 0 | Status: SHIPPED | OUTPATIENT
Start: 2025-09-02

## 2025-09-04 ENCOUNTER — SPECIALTY PHARMACY (OUTPATIENT)
Dept: PHARMACY | Facility: CLINIC | Age: 55
End: 2025-09-04

## 2025-09-04 PROCEDURE — RXMED WILLOW AMBULATORY MEDICATION CHARGE

## 2025-09-05 ENCOUNTER — PHARMACY VISIT (OUTPATIENT)
Dept: PHARMACY | Facility: CLINIC | Age: 55
End: 2025-09-05
Payer: COMMERCIAL

## 2025-12-08 ENCOUNTER — APPOINTMENT (OUTPATIENT)
Dept: CARDIOLOGY | Facility: CLINIC | Age: 55
End: 2025-12-08
Payer: COMMERCIAL

## 2025-12-11 ENCOUNTER — APPOINTMENT (OUTPATIENT)
Dept: CARDIOLOGY | Facility: CLINIC | Age: 55
End: 2025-12-11
Payer: COMMERCIAL

## (undated) DEVICE — ADHESIVE, SKIN, LIQUIBAND EXCEED

## (undated) DEVICE — DRAPE, ARM XI

## (undated) DEVICE — TUBE SET, PNEUMOLAR HEATED, SMOKE EVACU, HIGH-FLOW

## (undated) DEVICE — GLOVE, SURGICAL, PROTEXIS PI MICRO, 8.0, PF, LF

## (undated) DEVICE — SUTURE, VICRYL PLUS, 0, 27IN, UR-6, VIOLET, BRAIDED

## (undated) DEVICE — OBTURATOR, BLADELESS , SU

## (undated) DEVICE — SUTURE, V-LOC 3-0 V-20 6 GR 180 ABS"

## (undated) DEVICE — SEAL, UNIVERSAL 5-8MM  XI

## (undated) DEVICE — COVER, TIP HOT SHEARS ENDOWRIST

## (undated) DEVICE — TROCAR SYSTEM, BALLOON, KII GELPORT, 12 X 100MM

## (undated) DEVICE — SUTURE, VICRYL PLUS 3-0, SH, 27IN

## (undated) DEVICE — SUTURE, MONOCRYL, 4-0, 18 IN, PS2, UNDYED

## (undated) DEVICE — LUBRICANT, ELECTROLUBE, F/ELECTRODE TIPS

## (undated) DEVICE — Device

## (undated) DEVICE — SUTURE, V-LOC PBT, GS-21, 12 IN, BLUE, NONABS

## (undated) DEVICE — GLOVE, SURGICAL, PROTEXIS PI BLUE W/NEUTHERA, 8.0, PF, LF

## (undated) DEVICE — CARE KIT, LAPAROSCOPIC, ADVANCED